# Patient Record
Sex: MALE | Race: ASIAN | Employment: OTHER | URBAN - NONMETROPOLITAN AREA
[De-identification: names, ages, dates, MRNs, and addresses within clinical notes are randomized per-mention and may not be internally consistent; named-entity substitution may affect disease eponyms.]

---

## 2017-01-25 DIAGNOSIS — I48.0 PAF (PAROXYSMAL ATRIAL FIBRILLATION) (HCC): ICD-10-CM

## 2017-01-25 DIAGNOSIS — I50.32 CHF NYHA CLASS II, CHRONIC, DIASTOLIC (HCC): ICD-10-CM

## 2017-01-25 DIAGNOSIS — R06.02 SOB (SHORTNESS OF BREATH) ON EXERTION: Primary | ICD-10-CM

## 2017-04-07 ENCOUNTER — HOSPITAL ENCOUNTER (EMERGENCY)
Age: 67
Discharge: HOME | End: 2017-04-07
Attending: EMERGENCY MEDICINE
Payer: MEDICARE

## 2017-04-07 VITALS
RESPIRATION RATE: 9 BRPM | HEART RATE: 84 BPM | DIASTOLIC BLOOD PRESSURE: 82 MMHG | OXYGEN SATURATION: 98 % | TEMPERATURE: 99 F | SYSTOLIC BLOOD PRESSURE: 129 MMHG

## 2017-04-07 DIAGNOSIS — R55 SYNCOPE AND COLLAPSE: Primary | ICD-10-CM

## 2017-04-07 LAB
ABSOLUTE EOS #: 0 K/UL (ref 0–0.4)
ABSOLUTE LYMPH #: 1.2 K/UL (ref 1–4.8)
ABSOLUTE MONO #: 0.3 K/UL (ref 0–1)
ALBUMIN SERPL-MCNC: 4.3 G/DL (ref 3.5–5.2)
ALBUMIN/GLOBULIN RATIO: 1.7 (ref 1–2.5)
ALP BLD-CCNC: 71 U/L (ref 40–129)
ALT SERPL-CCNC: 29 U/L (ref 5–41)
ANION GAP SERPL CALCULATED.3IONS-SCNC: 12 MMOL/L (ref 9–17)
AST SERPL-CCNC: 28 U/L
BASOPHILS # BLD: 0 % (ref 0–2)
BASOPHILS ABSOLUTE: 0 K/UL (ref 0–0.2)
BILIRUB SERPL-MCNC: 1.19 MG/DL (ref 0.3–1.2)
BUN BLDV-MCNC: 26 MG/DL (ref 8–23)
BUN/CREAT BLD: 23 (ref 9–20)
CALCIUM SERPL-MCNC: 8.8 MG/DL (ref 8.6–10.4)
CHLORIDE BLD-SCNC: 104 MMOL/L (ref 98–107)
CO2: 27 MMOL/L (ref 20–31)
CREAT SERPL-MCNC: 1.15 MG/DL (ref 0.7–1.2)
DIFFERENTIAL TYPE: ABNORMAL
EOSINOPHILS RELATIVE PERCENT: 1 % (ref 0–8)
GFR AFRICAN AMERICAN: >60 ML/MIN
GFR NON-AFRICAN AMERICAN: >60 ML/MIN
GFR SERPL CREATININE-BSD FRML MDRD: ABNORMAL ML/MIN/{1.73_M2}
GFR SERPL CREATININE-BSD FRML MDRD: ABNORMAL ML/MIN/{1.73_M2}
GLUCOSE BLD-MCNC: 104 MG/DL (ref 70–99)
HCT VFR BLD CALC: 41.9 % (ref 41–53)
HEMOGLOBIN: 13.8 G/DL (ref 13.5–17)
LYMPHOCYTES # BLD: 20 % (ref 24–44)
MCH RBC QN AUTO: 29 PG (ref 26–34)
MCHC RBC AUTO-ENTMCNC: 33 G/DL (ref 31–37)
MCV RBC AUTO: 87.9 FL (ref 80–100)
MONOCYTES # BLD: 6 % (ref 0–12)
PDW BLD-RTO: 14.3 % (ref 12.1–15.2)
PLATELET # BLD: 143 K/UL (ref 140–450)
PLATELET ESTIMATE: ABNORMAL
PMV BLD AUTO: ABNORMAL FL (ref 6–12)
POTASSIUM SERPL-SCNC: 3.7 MMOL/L (ref 3.7–5.3)
RBC # BLD: 4.77 M/UL (ref 4.5–5.9)
RBC # BLD: ABNORMAL 10*6/UL
SEG NEUTROPHILS: 73 % (ref 36–66)
SEGMENTED NEUTROPHILS ABSOLUTE COUNT: 4.3 K/UL (ref 1.8–7.7)
SODIUM BLD-SCNC: 143 MMOL/L (ref 135–144)
TOTAL PROTEIN: 6.8 G/DL (ref 6.4–8.3)
TROPONIN INTERP: NORMAL
TROPONIN T: <0.03 NG/ML
TSH SERPL DL<=0.05 MIU/L-ACNC: 0.77 MIU/L (ref 0.3–5)
WBC # BLD: 5.9 K/UL (ref 3.5–11)
WBC # BLD: ABNORMAL 10*3/UL

## 2017-04-07 PROCEDURE — 96361 HYDRATE IV INFUSION ADD-ON: CPT

## 2017-04-07 PROCEDURE — 2580000003 HC RX 258: Performed by: EMERGENCY MEDICINE

## 2017-04-07 PROCEDURE — 85025 COMPLETE CBC W/AUTO DIFF WBC: CPT

## 2017-04-07 PROCEDURE — 96360 HYDRATION IV INFUSION INIT: CPT

## 2017-04-07 PROCEDURE — 93005 ELECTROCARDIOGRAM TRACING: CPT

## 2017-04-07 PROCEDURE — 84443 ASSAY THYROID STIM HORMONE: CPT

## 2017-04-07 PROCEDURE — 84484 ASSAY OF TROPONIN QUANT: CPT

## 2017-04-07 PROCEDURE — 99284 EMERGENCY DEPT VISIT MOD MDM: CPT

## 2017-04-07 PROCEDURE — 80053 COMPREHEN METABOLIC PANEL: CPT

## 2017-04-07 RX ORDER — ALBUTEROL SULFATE 90 UG/1
2 AEROSOL, METERED RESPIRATORY (INHALATION) EVERY 6 HOURS PRN
COMMUNITY
End: 2022-06-10 | Stop reason: SDUPTHER

## 2017-04-07 RX ORDER — 0.9 % SODIUM CHLORIDE 0.9 %
1000 INTRAVENOUS SOLUTION INTRAVENOUS ONCE
Status: COMPLETED | OUTPATIENT
Start: 2017-04-07 | End: 2017-04-07

## 2017-04-07 RX ORDER — 0.9 % SODIUM CHLORIDE 0.9 %
500 INTRAVENOUS SOLUTION INTRAVENOUS ONCE
Status: COMPLETED | OUTPATIENT
Start: 2017-04-07 | End: 2017-04-07

## 2017-04-07 RX ADMIN — SODIUM CHLORIDE 500 ML: 9 INJECTION, SOLUTION INTRAVENOUS at 19:11

## 2017-04-07 RX ADMIN — SODIUM CHLORIDE 1000 ML: 9 INJECTION, SOLUTION INTRAVENOUS at 17:25

## 2017-04-07 ASSESSMENT — ENCOUNTER SYMPTOMS
COUGH: 0
BACK PAIN: 0
VOMITING: 0
DIARRHEA: 0
CHEST TIGHTNESS: 0
ABDOMINAL PAIN: 0
SORE THROAT: 0
VOICE CHANGE: 0
SHORTNESS OF BREATH: 0
WHEEZING: 0
FACIAL SWELLING: 0
PHOTOPHOBIA: 0
NAUSEA: 0
BLOOD IN STOOL: 0
TROUBLE SWALLOWING: 0

## 2017-04-13 LAB
EKG ATRIAL RATE: 69 BPM
EKG P AXIS: 42 DEGREES
EKG P-R INTERVAL: 140 MS
EKG Q-T INTERVAL: 376 MS
EKG QRS DURATION: 112 MS
EKG QTC CALCULATION (BAZETT): 402 MS
EKG R AXIS: 8 DEGREES
EKG T AXIS: 2 DEGREES
EKG VENTRICULAR RATE: 69 BPM

## 2017-11-30 ENCOUNTER — OFFICE VISIT (OUTPATIENT)
Dept: PULMONOLOGY | Age: 67
End: 2017-11-30
Payer: MEDICARE

## 2017-11-30 VITALS
RESPIRATION RATE: 16 BRPM | BODY MASS INDEX: 29.66 KG/M2 | SYSTOLIC BLOOD PRESSURE: 136 MMHG | HEART RATE: 72 BPM | DIASTOLIC BLOOD PRESSURE: 80 MMHG | HEIGHT: 65 IN | WEIGHT: 178 LBS | OXYGEN SATURATION: 99 % | TEMPERATURE: 97.1 F

## 2017-11-30 DIAGNOSIS — I48.0 PAROXYSMAL ATRIAL FIBRILLATION (HCC): ICD-10-CM

## 2017-11-30 DIAGNOSIS — K21.9 GASTROESOPHAGEAL REFLUX DISEASE, ESOPHAGITIS PRESENCE NOT SPECIFIED: ICD-10-CM

## 2017-11-30 DIAGNOSIS — Z95.0 PACEMAKER: ICD-10-CM

## 2017-11-30 DIAGNOSIS — I31.39 PERICARDIAL EFFUSION WITH CARDIAC TAMPONADE: ICD-10-CM

## 2017-11-30 DIAGNOSIS — I31.4 PERICARDIAL EFFUSION WITH CARDIAC TAMPONADE: ICD-10-CM

## 2017-11-30 DIAGNOSIS — J45.21 ASTHMA IN ADULT, MILD INTERMITTENT, WITH ACUTE EXACERBATION: Primary | ICD-10-CM

## 2017-11-30 PROCEDURE — 3017F COLORECTAL CA SCREEN DOC REV: CPT | Performed by: INTERNAL MEDICINE

## 2017-11-30 PROCEDURE — 1036F TOBACCO NON-USER: CPT | Performed by: INTERNAL MEDICINE

## 2017-11-30 PROCEDURE — 1123F ACP DISCUSS/DSCN MKR DOCD: CPT | Performed by: INTERNAL MEDICINE

## 2017-11-30 PROCEDURE — G8482 FLU IMMUNIZE ORDER/ADMIN: HCPCS | Performed by: INTERNAL MEDICINE

## 2017-11-30 PROCEDURE — 99213 OFFICE O/P EST LOW 20 MIN: CPT | Performed by: INTERNAL MEDICINE

## 2017-11-30 PROCEDURE — G8419 CALC BMI OUT NRM PARAM NOF/U: HCPCS | Performed by: INTERNAL MEDICINE

## 2017-11-30 PROCEDURE — G8427 DOCREV CUR MEDS BY ELIG CLIN: HCPCS | Performed by: INTERNAL MEDICINE

## 2017-11-30 PROCEDURE — 4040F PNEUMOC VAC/ADMIN/RCVD: CPT | Performed by: INTERNAL MEDICINE

## 2017-11-30 RX ORDER — MONTELUKAST SODIUM 10 MG/1
10 TABLET ORAL DAILY
Qty: 30 TABLET | Refills: 11 | Status: SHIPPED | OUTPATIENT
Start: 2017-11-30 | End: 2017-12-19 | Stop reason: SDUPTHER

## 2017-11-30 RX ORDER — PREDNISONE 10 MG/1
TABLET ORAL
Qty: 30 TABLET | Refills: 0 | Status: SHIPPED | OUTPATIENT
Start: 2017-11-30 | End: 2018-11-16 | Stop reason: ALTCHOICE

## 2017-11-30 ASSESSMENT — ENCOUNTER SYMPTOMS
SHORTNESS OF BREATH: 1
COUGH: 1
WHEEZING: 1
CHEST TIGHTNESS: 1

## 2017-11-30 NOTE — PROGRESS NOTES
Value Ref Range    Ventricular Rate 69 BPM    Atrial Rate 69 BPM    P-R Interval 140 ms    QRS Duration 112 ms    Q-T Interval 376 ms    QTc Calculation (Bazett) 402 ms    P Axis 42 degrees    R Axis 8 degrees    T Axis 2 degrees       Assessment:      1. Asthma in adult, mild intermittent, with acute exacerbation    2. Gastroesophageal reflux disease, esophagitis presence not specified    3. Paroxysmal atrial fibrillation (HCC)    4. Pacemaker    5. Pericardial effusion with cardiac tamponade          Plan:      1. Prednisone standard burst.  2. Alvesco 160 µg(activates and lower airways)  3. Add Singulair 10 mg daily at bedtime. 4. Albuterol when necessary. 5. Discuss strategies for management of asthma. Full-time treatment for part-time disease. 6. Discussed the concept of escalation and de-escalation of treatment. 7. Return as needed. Call if symptoms not better in a few days.       Electronically signed by Homero Hollins DO on 11/30/2017 at 2:43 PM

## 2017-11-30 NOTE — PATIENT INSTRUCTIONS
SURVEY:    You may be receiving a survey from [employer] regarding your visit today. Please complete the survey to enable us to provide the highest quality of care to you and your family. If you cannot score us a very good on any question, please call the office to discuss how we could of made your experience a very good one. Thank you.

## 2017-12-19 DIAGNOSIS — J45.21 ASTHMA IN ADULT, MILD INTERMITTENT, WITH ACUTE EXACERBATION: ICD-10-CM

## 2017-12-19 RX ORDER — MONTELUKAST SODIUM 10 MG/1
10 TABLET ORAL DAILY
Qty: 30 TABLET | Refills: 11 | Status: SHIPPED | OUTPATIENT
Start: 2017-12-19 | End: 2019-02-16 | Stop reason: SDUPTHER

## 2018-06-21 ENCOUNTER — HOSPITAL ENCOUNTER (OUTPATIENT)
Age: 68
Discharge: HOME | End: 2018-06-23
Payer: MEDICARE

## 2018-06-21 ENCOUNTER — HOSPITAL ENCOUNTER (OUTPATIENT)
Dept: GENERAL RADIOLOGY | Age: 68
Discharge: HOME | End: 2018-06-23
Payer: MEDICARE

## 2018-06-21 DIAGNOSIS — M54.5 LOW BACK PAIN, UNSPECIFIED BACK PAIN LATERALITY, UNSPECIFIED CHRONICITY, WITH SCIATICA PRESENCE UNSPECIFIED: ICD-10-CM

## 2018-06-21 PROCEDURE — 72170 X-RAY EXAM OF PELVIS: CPT

## 2018-06-21 PROCEDURE — 72100 X-RAY EXAM L-S SPINE 2/3 VWS: CPT

## 2018-11-16 ENCOUNTER — HOSPITAL ENCOUNTER (INPATIENT)
Age: 68
LOS: 5 days | Discharge: HOME | DRG: 392 | End: 2018-11-21
Attending: INTERNAL MEDICINE | Admitting: INTERNAL MEDICINE
Payer: MEDICARE

## 2018-11-16 ENCOUNTER — HOSPITAL ENCOUNTER (OUTPATIENT)
Dept: CT IMAGING | Age: 68
Discharge: HOME | DRG: 392 | End: 2018-11-18
Payer: MEDICARE

## 2018-11-16 ENCOUNTER — HOSPITAL ENCOUNTER (OUTPATIENT)
Dept: LAB | Age: 68
Discharge: HOME | DRG: 392 | End: 2018-11-16
Payer: MEDICARE

## 2018-11-16 ENCOUNTER — TELEPHONE (OUTPATIENT)
Dept: SURGERY | Age: 68
End: 2018-11-16

## 2018-11-16 ENCOUNTER — HOSPITAL ENCOUNTER (OUTPATIENT)
Dept: INFUSION THERAPY | Age: 68
Discharge: HOME | DRG: 392 | End: 2018-11-16
Payer: MEDICARE

## 2018-11-16 ENCOUNTER — OFFICE VISIT (OUTPATIENT)
Dept: SURGERY | Age: 68
End: 2018-11-16

## 2018-11-16 VITALS
HEIGHT: 65 IN | RESPIRATION RATE: 24 BRPM | TEMPERATURE: 99.4 F | DIASTOLIC BLOOD PRESSURE: 90 MMHG | SYSTOLIC BLOOD PRESSURE: 153 MMHG | BODY MASS INDEX: 29.62 KG/M2

## 2018-11-16 VITALS
DIASTOLIC BLOOD PRESSURE: 96 MMHG | TEMPERATURE: 99.1 F | HEART RATE: 81 BPM | RESPIRATION RATE: 18 BRPM | SYSTOLIC BLOOD PRESSURE: 161 MMHG

## 2018-11-16 DIAGNOSIS — K52.9 COLITIS: Primary | ICD-10-CM

## 2018-11-16 DIAGNOSIS — R10.31 RLQ ABDOMINAL PAIN: ICD-10-CM

## 2018-11-16 DIAGNOSIS — K57.92 ACUTE DIVERTICULITIS: ICD-10-CM

## 2018-11-16 LAB
ABSOLUTE EOS #: 0.05 K/UL (ref 0–0.44)
ABSOLUTE IMMATURE GRANULOCYTE: <0.03 K/UL (ref 0–0.3)
ABSOLUTE LYMPH #: 1.61 K/UL (ref 1.1–3.7)
ABSOLUTE MONO #: 0.7 K/UL (ref 0.1–1.2)
ANION GAP SERPL CALCULATED.3IONS-SCNC: 9 MMOL/L (ref 9–17)
BASOPHILS # BLD: 0 % (ref 0–2)
BASOPHILS ABSOLUTE: <0.03 K/UL (ref 0–0.2)
BUN BLDV-MCNC: 19 MG/DL (ref 8–23)
BUN/CREAT BLD: 19 (ref 9–20)
CALCIUM SERPL-MCNC: 9.8 MG/DL (ref 8.6–10.4)
CHLORIDE BLD-SCNC: 102 MMOL/L (ref 98–107)
CO2: 30 MMOL/L (ref 20–31)
CREAT SERPL-MCNC: 0.98 MG/DL (ref 0.7–1.2)
DIFFERENTIAL TYPE: ABNORMAL
EOSINOPHILS RELATIVE PERCENT: 1 % (ref 1–4)
GFR AFRICAN AMERICAN: >60 ML/MIN
GFR NON-AFRICAN AMERICAN: >60 ML/MIN
GFR SERPL CREATININE-BSD FRML MDRD: NORMAL ML/MIN/{1.73_M2}
GFR SERPL CREATININE-BSD FRML MDRD: NORMAL ML/MIN/{1.73_M2}
GLUCOSE BLD-MCNC: 95 MG/DL (ref 70–99)
HCT VFR BLD CALC: 43.8 % (ref 40.7–50.3)
HEMOGLOBIN: 14.7 G/DL (ref 13–17)
IMMATURE GRANULOCYTES: 0 %
LYMPHOCYTES # BLD: 17 % (ref 24–43)
MCH RBC QN AUTO: 29.5 PG (ref 25.2–33.5)
MCHC RBC AUTO-ENTMCNC: 33.6 G/DL (ref 28.4–34.8)
MCV RBC AUTO: 88 FL (ref 82.6–102.9)
MONOCYTES # BLD: 8 % (ref 3–12)
NRBC AUTOMATED: 0 PER 100 WBC
PDW BLD-RTO: 13.3 % (ref 11.8–14.4)
PLATELET # BLD: 159 K/UL (ref 138–453)
PLATELET ESTIMATE: ABNORMAL
PMV BLD AUTO: 8.7 FL (ref 8.1–13.5)
POTASSIUM SERPL-SCNC: 4.3 MMOL/L (ref 3.7–5.3)
RBC # BLD: 4.98 M/UL (ref 4.21–5.77)
RBC # BLD: ABNORMAL 10*6/UL
SEG NEUTROPHILS: 74 % (ref 36–65)
SEGMENTED NEUTROPHILS ABSOLUTE COUNT: 6.92 K/UL (ref 1.5–8.1)
SODIUM BLD-SCNC: 141 MMOL/L (ref 135–144)
WBC # BLD: 9.3 K/UL (ref 3.5–11.3)
WBC # BLD: ABNORMAL 10*3/UL

## 2018-11-16 PROCEDURE — 96365 THER/PROPH/DIAG IV INF INIT: CPT

## 2018-11-16 PROCEDURE — 2000000000 HC ICU R&B

## 2018-11-16 PROCEDURE — 36415 COLL VENOUS BLD VENIPUNCTURE: CPT

## 2018-11-16 PROCEDURE — 80048 BASIC METABOLIC PNL TOTAL CA: CPT

## 2018-11-16 PROCEDURE — 2500000003 HC RX 250 WO HCPCS: Performed by: SURGERY

## 2018-11-16 PROCEDURE — 74177 CT ABD & PELVIS W/CONTRAST: CPT

## 2018-11-16 PROCEDURE — 99999 PR OFFICE/OUTPT VISIT,PROCEDURE ONLY: CPT | Performed by: SURGERY

## 2018-11-16 PROCEDURE — 99221 1ST HOSP IP/OBS SF/LOW 40: CPT | Performed by: SURGERY

## 2018-11-16 PROCEDURE — 2580000003 HC RX 258: Performed by: SURGERY

## 2018-11-16 PROCEDURE — 85025 COMPLETE CBC W/AUTO DIFF WBC: CPT

## 2018-11-16 PROCEDURE — 6360000004 HC RX CONTRAST MEDICATION: Performed by: INTERNAL MEDICINE

## 2018-11-16 RX ORDER — SULFAMETHOXAZOLE AND TRIMETHOPRIM 800; 160 MG/1; MG/1
1 TABLET ORAL 2 TIMES DAILY
Qty: 20 TABLET | Refills: 0 | Status: SHIPPED | OUTPATIENT
Start: 2018-11-16 | End: 2018-11-26

## 2018-11-16 RX ADMIN — SODIUM CHLORIDE: 9 INJECTION, SOLUTION INTRAVENOUS at 23:10

## 2018-11-16 RX ADMIN — METRONIDAZOLE 500 MG: 500 INJECTION, SOLUTION INTRAVENOUS at 16:33

## 2018-11-16 RX ADMIN — IOPAMIDOL 75 ML: 755 INJECTION, SOLUTION INTRAVENOUS at 14:51

## 2018-11-16 ASSESSMENT — PAIN DESCRIPTION - ORIENTATION: ORIENTATION: MID

## 2018-11-16 ASSESSMENT — PAIN DESCRIPTION - DESCRIPTORS: DESCRIPTORS: ACHING

## 2018-11-16 ASSESSMENT — PAIN DESCRIPTION - PAIN TYPE: TYPE: ACUTE PAIN

## 2018-11-16 ASSESSMENT — PAIN DESCRIPTION - FREQUENCY: FREQUENCY: INTERMITTENT

## 2018-11-16 ASSESSMENT — PAIN SCALES - GENERAL: PAINLEVEL_OUTOF10: 2

## 2018-11-16 ASSESSMENT — PAIN DESCRIPTION - LOCATION: LOCATION: ABDOMEN

## 2018-11-16 NOTE — PROGRESS NOTES
TIA  Skin: Denies rashes, ulcers  Musculoskeletal: no gross motor dysfunction. Hx radiculopathy    Allergies: Amoxicillin-pot clavulanate; Ciprofloxacin; and Amoxicillin    Current Meds:  Current Outpatient Prescriptions:     sulfamethoxazole-trimethoprim (BACTRIM DS;SEPTRA DS) 800-160 MG per tablet, Take 1 tablet by mouth 2 times daily for 10 days, Disp: 20 tablet, Rfl: 0    Fluticasone Furoate-Vilanterol (BREO ELLIPTA) 200-25 MCG/INH AEPB, Inhale 1 puff into the lungs every other day , Disp: , Rfl:     montelukast (SINGULAIR) 10 MG tablet, Take 1 tablet by mouth daily, Disp: 30 tablet, Rfl: 11    albuterol sulfate  (90 BASE) MCG/ACT inhaler, Inhale 2 puffs into the lungs every 6 hours as needed for Wheezing, Disp: , Rfl:     BYSTOLIC 5 MG tablet, Take 5 mg by mouth daily Patient taking 2.5mg, Disp: , Rfl:     apixaban (ELIQUIS) 5 MG TABS tablet, Take 1 tablet by mouth 2 times daily, Disp: 180 tablet, Rfl: 3    atorvastatin (LIPITOR) 10 MG tablet, Take 1 tablet by mouth daily. , Disp: 30 tablet, Rfl: 3    pantoprazole (PROTONIX) 40 MG tablet, Take 1 tablet by mouth daily. , Disp: 30 tablet, Rfl: 3    tamsulosin (FLOMAX) 0.4 MG capsule, Take 1 capsule by mouth daily. , Disp: 30 capsule, Rfl: 3    allopurinol (ZYLOPRIM) 300 MG tablet, Take 300 mg by mouth daily. , Disp: , Rfl:     levothyroxine (SYNTHROID) 112 MCG tablet, Take 112 mcg by mouth Daily. , Disp: , Rfl:     Physical Exam:  Vital signs and Nurse's note reviewed  Gen:  A&Ox3, NAD. Pleasant and cooperative. HEENT: EOMI, no scleral icterus  CVS: Regular rate.  Has pacemaker  Resp: no active wheezing, no labored breathing  Abd: soft, tenderness right abdomen no rigidity no peritonitis  Ext: Moves all extremities, no gross focal motor deficits  Skin: No erythema or ulcerations     Labs:   Lab Results   Component Value Date    WBC 9.3 11/16/2018    HGB 14.7 11/16/2018    HCT 43.8 11/16/2018    MCV 88.0 11/16/2018     11/16/2018     01/21/2012     Lab Results   Component Value Date     11/16/2018    K 4.3 11/16/2018     11/16/2018    CO2 30 11/16/2018    BUN 19 11/16/2018    CREATININE 0.98 11/16/2018    GLUCOSE 95 11/16/2018    GLUCOSE 128 01/21/2012    CALCIUM 9.8 11/16/2018     Lab Results   Component Value Date    ALKPHOS 71 04/07/2017    ALT 29 04/07/2017    AST 28 04/07/2017    PROT 6.8 04/07/2017    BILITOT 1.19 04/07/2017    LABALBU 4.3 04/07/2017    LABALBU 3.3 01/21/2012     Radiologic Studies:     EXAMINATION:   CT OF THE ABDOMEN AND PELVIS WITH CONTRAST 11/16/2018 2:51 pm       TECHNIQUE:   CT of the abdomen and pelvis was performed with the administration of   intravenous contrast. Multiplanar reformatted images are provided for review. Dose modulation, iterative reconstruction, and/or weight based adjustment of   the mA/kV was utilized to reduce the radiation dose to as low as reasonably   achievable.       COMPARISON:   None.       HISTORY:   ORDERING SYSTEM PROVIDED HISTORY: [facility] abdominal pain   TECHNOLOGIST PROVIDED HISTORY:           FINDINGS:   Lower Chest: The lung bases are clear.  A pacing device is partially   visualized.       Organs: The liver, gallbladder, pancreas, spleen, adrenals and kidneys reveal   no acute findings.  Bilateral renal cysts are noted.  Additionally there are   subcentimeter hypoattenuating renal lesions too small to characterize however   these likely represent cysts.       GI/Bowel: Right-sided diverticulosis is present with an area of localized   inflammatory process in the proximal ascending colon compatible with acute   diverticulitis. Rosaura Record is no gross perforation, abscess formation or evidence   for obstruction.  The nearby appendix is normal in appearance.  The terminal   ileum also appears normal.  The remainder of the colon and small bowel   appears within normal limits.       Pelvis: No acute findings.  The prostate is mildly enlarged, impressing upon   the bladder base.

## 2018-11-16 NOTE — PLAN OF CARE
IV ATB  Infusion completed. IV lock flushed with out complication. Tolerated well. BP rechecked. Stated he would recheck at home. Instructed to call PCP with any change in condition or worsening symptoms. Reviewed next appointment time. Refused w/c at present. Ambulated to private vehicle with spouse.  Adarsh Roberts RN

## 2018-11-17 PROBLEM — K57.92 DIVERTICULITIS: Status: ACTIVE | Noted: 2018-11-17

## 2018-11-17 LAB
ABSOLUTE EOS #: <0.03 K/UL (ref 0–0.44)
ABSOLUTE IMMATURE GRANULOCYTE: <0.03 K/UL (ref 0–0.3)
ABSOLUTE LYMPH #: 1.01 K/UL (ref 1.1–3.7)
ABSOLUTE MONO #: 0.82 K/UL (ref 0.1–1.2)
ANION GAP SERPL CALCULATED.3IONS-SCNC: 13 MMOL/L (ref 9–17)
BASOPHILS # BLD: 0 % (ref 0–2)
BASOPHILS ABSOLUTE: <0.03 K/UL (ref 0–0.2)
BUN BLDV-MCNC: 16 MG/DL (ref 8–23)
BUN/CREAT BLD: 15 (ref 9–20)
C-REACTIVE PROTEIN: 76.3 MG/L (ref 0–5)
CALCIUM SERPL-MCNC: 8.7 MG/DL (ref 8.6–10.4)
CHLORIDE BLD-SCNC: 99 MMOL/L (ref 98–107)
CO2: 26 MMOL/L (ref 20–31)
CREAT SERPL-MCNC: 1.08 MG/DL (ref 0.7–1.2)
DIFFERENTIAL TYPE: ABNORMAL
EOSINOPHILS RELATIVE PERCENT: 0 % (ref 1–4)
GFR AFRICAN AMERICAN: >60 ML/MIN
GFR NON-AFRICAN AMERICAN: >60 ML/MIN
GFR SERPL CREATININE-BSD FRML MDRD: ABNORMAL ML/MIN/{1.73_M2}
GFR SERPL CREATININE-BSD FRML MDRD: ABNORMAL ML/MIN/{1.73_M2}
GLUCOSE BLD-MCNC: 112 MG/DL (ref 70–99)
HCT VFR BLD CALC: 40.2 % (ref 40.7–50.3)
HEMOGLOBIN: 13.6 G/DL (ref 13–17)
IMMATURE GRANULOCYTES: 0 %
LACTIC ACID, WHOLE BLOOD: NORMAL MMOL/L (ref 0.7–2.1)
LACTIC ACID: 0.9 MMOL/L (ref 0.5–2.2)
LYMPHOCYTES # BLD: 9 % (ref 24–43)
MCH RBC QN AUTO: 29 PG (ref 25.2–33.5)
MCHC RBC AUTO-ENTMCNC: 33.8 G/DL (ref 28.4–34.8)
MCV RBC AUTO: 85.7 FL (ref 82.6–102.9)
MONOCYTES # BLD: 8 % (ref 3–12)
NRBC AUTOMATED: 0 PER 100 WBC
PDW BLD-RTO: 13.3 % (ref 11.8–14.4)
PLATELET # BLD: 153 K/UL (ref 138–453)
PLATELET ESTIMATE: ABNORMAL
PMV BLD AUTO: 9.6 FL (ref 8.1–13.5)
POTASSIUM SERPL-SCNC: 3.9 MMOL/L (ref 3.7–5.3)
RBC # BLD: 4.69 M/UL (ref 4.21–5.77)
RBC # BLD: ABNORMAL 10*6/UL
SEG NEUTROPHILS: 83 % (ref 36–65)
SEGMENTED NEUTROPHILS ABSOLUTE COUNT: 8.93 K/UL (ref 1.5–8.1)
SODIUM BLD-SCNC: 138 MMOL/L (ref 135–144)
WBC # BLD: 10.8 K/UL (ref 3.5–11.3)
WBC # BLD: ABNORMAL 10*3/UL

## 2018-11-17 PROCEDURE — 2500000003 HC RX 250 WO HCPCS: Performed by: SURGERY

## 2018-11-17 PROCEDURE — 6360000002 HC RX W HCPCS: Performed by: SURGERY

## 2018-11-17 PROCEDURE — 86140 C-REACTIVE PROTEIN: CPT

## 2018-11-17 PROCEDURE — 6370000000 HC RX 637 (ALT 250 FOR IP): Performed by: INTERNAL MEDICINE

## 2018-11-17 PROCEDURE — 83605 ASSAY OF LACTIC ACID: CPT

## 2018-11-17 PROCEDURE — 85025 COMPLETE CBC W/AUTO DIFF WBC: CPT

## 2018-11-17 PROCEDURE — 80048 BASIC METABOLIC PNL TOTAL CA: CPT

## 2018-11-17 PROCEDURE — 2580000003 HC RX 258: Performed by: SURGERY

## 2018-11-17 PROCEDURE — 6370000000 HC RX 637 (ALT 250 FOR IP): Performed by: SURGERY

## 2018-11-17 PROCEDURE — 36415 COLL VENOUS BLD VENIPUNCTURE: CPT

## 2018-11-17 PROCEDURE — 2000000000 HC ICU R&B

## 2018-11-17 RX ORDER — ATORVASTATIN CALCIUM 10 MG/1
10 TABLET, FILM COATED ORAL DAILY
Status: DISCONTINUED | OUTPATIENT
Start: 2018-11-17 | End: 2018-11-21 | Stop reason: HOSPADM

## 2018-11-17 RX ORDER — ALBUTEROL SULFATE 90 UG/1
2 AEROSOL, METERED RESPIRATORY (INHALATION) EVERY 6 HOURS PRN
Status: DISCONTINUED | OUTPATIENT
Start: 2018-11-17 | End: 2018-11-21 | Stop reason: HOSPADM

## 2018-11-17 RX ORDER — LEVOTHYROXINE SODIUM 112 UG/1
112 TABLET ORAL DAILY
Status: DISCONTINUED | OUTPATIENT
Start: 2018-11-17 | End: 2018-11-21 | Stop reason: HOSPADM

## 2018-11-17 RX ORDER — TAMSULOSIN HYDROCHLORIDE 0.4 MG/1
0.4 CAPSULE ORAL DAILY
Status: DISCONTINUED | OUTPATIENT
Start: 2018-11-17 | End: 2018-11-21 | Stop reason: HOSPADM

## 2018-11-17 RX ORDER — HYDROCODONE BITARTRATE AND ACETAMINOPHEN 5; 325 MG/1; MG/1
1 TABLET ORAL EVERY 4 HOURS PRN
Status: DISCONTINUED | OUTPATIENT
Start: 2018-11-17 | End: 2018-11-21 | Stop reason: HOSPADM

## 2018-11-17 RX ORDER — ACETAMINOPHEN 325 MG/1
650 TABLET ORAL EVERY 4 HOURS PRN
Status: DISCONTINUED | OUTPATIENT
Start: 2018-11-17 | End: 2018-11-21 | Stop reason: HOSPADM

## 2018-11-17 RX ORDER — SODIUM CHLORIDE 9 MG/ML
INJECTION, SOLUTION INTRAVENOUS CONTINUOUS
Status: DISCONTINUED | OUTPATIENT
Start: 2018-11-17 | End: 2018-11-19

## 2018-11-17 RX ORDER — FLUTICASONE FUROATE AND VILANTEROL 200; 25 UG/1; UG/1
1 POWDER RESPIRATORY (INHALATION) EVERY OTHER DAY
Status: DISCONTINUED | OUTPATIENT
Start: 2018-11-17 | End: 2018-11-21 | Stop reason: HOSPADM

## 2018-11-17 RX ORDER — ONDANSETRON 2 MG/ML
4 INJECTION INTRAMUSCULAR; INTRAVENOUS EVERY 8 HOURS PRN
Status: DISCONTINUED | OUTPATIENT
Start: 2018-11-17 | End: 2018-11-21 | Stop reason: HOSPADM

## 2018-11-17 RX ORDER — METRONIDAZOLE 250 MG/1
500 TABLET ORAL EVERY 8 HOURS SCHEDULED
Status: DISCONTINUED | OUTPATIENT
Start: 2018-11-17 | End: 2018-11-17

## 2018-11-17 RX ORDER — SULFAMETHOXAZOLE AND TRIMETHOPRIM 800; 160 MG/1; MG/1
1 TABLET ORAL 2 TIMES DAILY
Status: DISCONTINUED | OUTPATIENT
Start: 2018-11-17 | End: 2018-11-17

## 2018-11-17 RX ORDER — PANTOPRAZOLE SODIUM 40 MG/1
40 TABLET, DELAYED RELEASE ORAL DAILY
Status: DISCONTINUED | OUTPATIENT
Start: 2018-11-17 | End: 2018-11-21 | Stop reason: HOSPADM

## 2018-11-17 RX ORDER — ALLOPURINOL 300 MG/1
300 TABLET ORAL DAILY
Status: DISCONTINUED | OUTPATIENT
Start: 2018-11-17 | End: 2018-11-21 | Stop reason: HOSPADM

## 2018-11-17 RX ORDER — MORPHINE SULFATE 2 MG/ML
2 INJECTION, SOLUTION INTRAMUSCULAR; INTRAVENOUS
Status: DISCONTINUED | OUTPATIENT
Start: 2018-11-16 | End: 2018-11-21 | Stop reason: HOSPADM

## 2018-11-17 RX ORDER — LACTOBACILLUS RHAMNOSUS GG 10B CELL
1 CAPSULE ORAL DAILY
Status: DISCONTINUED | OUTPATIENT
Start: 2018-11-17 | End: 2018-11-21 | Stop reason: HOSPADM

## 2018-11-17 RX ORDER — MONTELUKAST SODIUM 10 MG/1
10 TABLET ORAL DAILY
Status: DISCONTINUED | OUTPATIENT
Start: 2018-11-17 | End: 2018-11-21 | Stop reason: HOSPADM

## 2018-11-17 RX ADMIN — METRONIDAZOLE 500 MG: 500 INJECTION, SOLUTION INTRAVENOUS at 18:49

## 2018-11-17 RX ADMIN — MORPHINE SULFATE 2 MG: 2 INJECTION, SOLUTION INTRAMUSCULAR; INTRAVENOUS at 13:17

## 2018-11-17 RX ADMIN — SODIUM CHLORIDE: 9 INJECTION, SOLUTION INTRAVENOUS at 16:34

## 2018-11-17 RX ADMIN — Medication 1 CAPSULE: at 14:13

## 2018-11-17 RX ADMIN — ACETAMINOPHEN 650 MG: 325 TABLET, FILM COATED ORAL at 12:55

## 2018-11-17 RX ADMIN — METRONIDAZOLE 500 MG: 500 INJECTION, SOLUTION INTRAVENOUS at 10:49

## 2018-11-17 RX ADMIN — LEVOTHYROXINE SODIUM 112 MCG: 0.11 TABLET ORAL at 07:43

## 2018-11-17 RX ADMIN — MEROPENEM 1 G: 1 INJECTION, POWDER, FOR SOLUTION INTRAVENOUS at 08:24

## 2018-11-17 RX ADMIN — MEROPENEM 1 G: 1 INJECTION, POWDER, FOR SOLUTION INTRAVENOUS at 16:21

## 2018-11-17 RX ADMIN — ONDANSETRON 4 MG: 2 INJECTION INTRAMUSCULAR; INTRAVENOUS at 01:51

## 2018-11-17 RX ADMIN — SODIUM CHLORIDE: 9 INJECTION, SOLUTION INTRAVENOUS at 07:47

## 2018-11-17 RX ADMIN — METRONIDAZOLE 500 MG: 500 INJECTION, SOLUTION INTRAVENOUS at 00:30

## 2018-11-17 RX ADMIN — ACETAMINOPHEN 650 MG: 325 TABLET, FILM COATED ORAL at 18:49

## 2018-11-17 RX ADMIN — MORPHINE SULFATE 2 MG: 2 INJECTION, SOLUTION INTRAMUSCULAR; INTRAVENOUS at 09:31

## 2018-11-17 ASSESSMENT — PAIN DESCRIPTION - ORIENTATION
ORIENTATION: MID;RIGHT
ORIENTATION: RIGHT
ORIENTATION: MID;RIGHT
ORIENTATION: MID;RIGHT
ORIENTATION: MID
ORIENTATION: MID;RIGHT
ORIENTATION: MID;RIGHT

## 2018-11-17 ASSESSMENT — PAIN DESCRIPTION - PAIN TYPE
TYPE: ACUTE PAIN

## 2018-11-17 ASSESSMENT — PAIN DESCRIPTION - LOCATION
LOCATION: ABDOMEN

## 2018-11-17 ASSESSMENT — PAIN DESCRIPTION - DESCRIPTORS
DESCRIPTORS: ACHING;SHARP

## 2018-11-17 ASSESSMENT — PAIN DESCRIPTION - PROGRESSION
CLINICAL_PROGRESSION: NOT CHANGED
CLINICAL_PROGRESSION: NOT CHANGED
CLINICAL_PROGRESSION: GRADUALLY WORSENING

## 2018-11-17 ASSESSMENT — PAIN DESCRIPTION - ONSET
ONSET: ON-GOING

## 2018-11-17 ASSESSMENT — PAIN SCALES - GENERAL
PAINLEVEL_OUTOF10: 2
PAINLEVEL_OUTOF10: 8
PAINLEVEL_OUTOF10: 0
PAINLEVEL_OUTOF10: 4
PAINLEVEL_OUTOF10: 2
PAINLEVEL_OUTOF10: 2
PAINLEVEL_OUTOF10: 6
PAINLEVEL_OUTOF10: 2
PAINLEVEL_OUTOF10: 2
PAINLEVEL_OUTOF10: 6

## 2018-11-17 ASSESSMENT — PAIN DESCRIPTION - FREQUENCY
FREQUENCY: INTERMITTENT

## 2018-11-17 NOTE — PLAN OF CARE
Problem: Fluid Volume:  Goal: Maintenance of adequate hydration will improve  Maintenance of adequate hydration will improve   Outcome: Ongoing  Zofran 4 mg IVP given for C/O nausea. Denies need for pain medication.

## 2018-11-18 ENCOUNTER — APPOINTMENT (OUTPATIENT)
Dept: CT IMAGING | Age: 68
DRG: 392 | End: 2018-11-18
Attending: INTERNAL MEDICINE
Payer: MEDICARE

## 2018-11-18 ENCOUNTER — APPOINTMENT (OUTPATIENT)
Dept: GENERAL RADIOLOGY | Age: 68
DRG: 392 | End: 2018-11-18
Attending: INTERNAL MEDICINE
Payer: MEDICARE

## 2018-11-18 LAB
ABSOLUTE EOS #: <0.03 K/UL (ref 0–0.44)
ABSOLUTE IMMATURE GRANULOCYTE: 0.05 K/UL (ref 0–0.3)
ABSOLUTE LYMPH #: 1.07 K/UL (ref 1.1–3.7)
ABSOLUTE MONO #: 0.92 K/UL (ref 0.1–1.2)
ANION GAP SERPL CALCULATED.3IONS-SCNC: 14 MMOL/L (ref 9–17)
BASOPHILS # BLD: 0 % (ref 0–2)
BASOPHILS ABSOLUTE: <0.03 K/UL (ref 0–0.2)
BUN BLDV-MCNC: 21 MG/DL (ref 8–23)
BUN/CREAT BLD: 22 (ref 9–20)
C-REACTIVE PROTEIN: 249.3 MG/L (ref 0–5)
CALCIUM SERPL-MCNC: 8 MG/DL (ref 8.6–10.4)
CHLORIDE BLD-SCNC: 103 MMOL/L (ref 98–107)
CO2: 21 MMOL/L (ref 20–31)
CREAT SERPL-MCNC: 0.97 MG/DL (ref 0.7–1.2)
DIFFERENTIAL TYPE: ABNORMAL
EKG ATRIAL RATE: 89 BPM
EKG P AXIS: 53 DEGREES
EKG P-R INTERVAL: 136 MS
EKG Q-T INTERVAL: 378 MS
EKG QRS DURATION: 110 MS
EKG QTC CALCULATION (BAZETT): 459 MS
EKG R AXIS: -3 DEGREES
EKG T AXIS: 7 DEGREES
EKG VENTRICULAR RATE: 89 BPM
EOSINOPHILS RELATIVE PERCENT: 0 % (ref 1–4)
GFR AFRICAN AMERICAN: >60 ML/MIN
GFR NON-AFRICAN AMERICAN: >60 ML/MIN
GFR SERPL CREATININE-BSD FRML MDRD: ABNORMAL ML/MIN/{1.73_M2}
GFR SERPL CREATININE-BSD FRML MDRD: ABNORMAL ML/MIN/{1.73_M2}
GLUCOSE BLD-MCNC: 93 MG/DL (ref 70–99)
HCT VFR BLD CALC: 40.3 % (ref 40.7–50.3)
HEMOGLOBIN: 13.6 G/DL (ref 13–17)
IMMATURE GRANULOCYTES: 0 %
LYMPHOCYTES # BLD: 9 % (ref 24–43)
MAGNESIUM: 1.9 MG/DL (ref 1.6–2.6)
MCH RBC QN AUTO: 29.2 PG (ref 25.2–33.5)
MCHC RBC AUTO-ENTMCNC: 33.7 G/DL (ref 28.4–34.8)
MCV RBC AUTO: 86.7 FL (ref 82.6–102.9)
MONOCYTES # BLD: 8 % (ref 3–12)
NRBC AUTOMATED: 0 PER 100 WBC
PDW BLD-RTO: 13.3 % (ref 11.8–14.4)
PLATELET # BLD: 133 K/UL (ref 138–453)
PLATELET ESTIMATE: ABNORMAL
PMV BLD AUTO: 9.6 FL (ref 8.1–13.5)
POTASSIUM SERPL-SCNC: 4.1 MMOL/L (ref 3.7–5.3)
RBC # BLD: 4.65 M/UL (ref 4.21–5.77)
RBC # BLD: ABNORMAL 10*6/UL
SEG NEUTROPHILS: 83 % (ref 36–65)
SEGMENTED NEUTROPHILS ABSOLUTE COUNT: 9.86 K/UL (ref 1.5–8.1)
SODIUM BLD-SCNC: 138 MMOL/L (ref 135–144)
WBC # BLD: 11.9 K/UL (ref 3.5–11.3)
WBC # BLD: ABNORMAL 10*3/UL

## 2018-11-18 PROCEDURE — 6360000002 HC RX W HCPCS: Performed by: SURGERY

## 2018-11-18 PROCEDURE — 86140 C-REACTIVE PROTEIN: CPT

## 2018-11-18 PROCEDURE — 2500000003 HC RX 250 WO HCPCS: Performed by: SURGERY

## 2018-11-18 PROCEDURE — 93005 ELECTROCARDIOGRAM TRACING: CPT

## 2018-11-18 PROCEDURE — 74177 CT ABD & PELVIS W/CONTRAST: CPT

## 2018-11-18 PROCEDURE — 85025 COMPLETE CBC W/AUTO DIFF WBC: CPT

## 2018-11-18 PROCEDURE — 6360000004 HC RX CONTRAST MEDICATION: Performed by: SURGERY

## 2018-11-18 PROCEDURE — 87324 CLOSTRIDIUM AG IA: CPT

## 2018-11-18 PROCEDURE — 71046 X-RAY EXAM CHEST 2 VIEWS: CPT

## 2018-11-18 PROCEDURE — 2000000000 HC ICU R&B

## 2018-11-18 PROCEDURE — 2580000003 HC RX 258: Performed by: SURGERY

## 2018-11-18 PROCEDURE — 87449 NOS EACH ORGANISM AG IA: CPT

## 2018-11-18 PROCEDURE — 80048 BASIC METABOLIC PNL TOTAL CA: CPT

## 2018-11-18 PROCEDURE — 83735 ASSAY OF MAGNESIUM: CPT

## 2018-11-18 PROCEDURE — 6370000000 HC RX 637 (ALT 250 FOR IP): Performed by: SURGERY

## 2018-11-18 PROCEDURE — 36415 COLL VENOUS BLD VENIPUNCTURE: CPT

## 2018-11-18 RX ADMIN — IOPAMIDOL 75 ML: 755 INJECTION, SOLUTION INTRAVENOUS at 10:13

## 2018-11-18 RX ADMIN — SODIUM CHLORIDE: 9 INJECTION, SOLUTION INTRAVENOUS at 01:01

## 2018-11-18 RX ADMIN — SODIUM CHLORIDE: 9 INJECTION, SOLUTION INTRAVENOUS at 11:32

## 2018-11-18 RX ADMIN — MEROPENEM 1 G: 1 INJECTION, POWDER, FOR SOLUTION INTRAVENOUS at 01:00

## 2018-11-18 RX ADMIN — MEROPENEM 1 G: 1 INJECTION, POWDER, FOR SOLUTION INTRAVENOUS at 16:44

## 2018-11-18 RX ADMIN — MEROPENEM 1 G: 1 INJECTION, POWDER, FOR SOLUTION INTRAVENOUS at 08:36

## 2018-11-18 RX ADMIN — IOHEXOL 50 ML: 240 INJECTION, SOLUTION INTRATHECAL; INTRAVASCULAR; INTRAVENOUS; ORAL at 10:12

## 2018-11-18 RX ADMIN — METRONIDAZOLE 500 MG: 500 INJECTION, SOLUTION INTRAVENOUS at 11:32

## 2018-11-18 RX ADMIN — Medication 1 CAPSULE: at 11:34

## 2018-11-18 RX ADMIN — METRONIDAZOLE 500 MG: 500 INJECTION, SOLUTION INTRAVENOUS at 18:42

## 2018-11-18 RX ADMIN — SODIUM CHLORIDE: 9 INJECTION, SOLUTION INTRAVENOUS at 20:29

## 2018-11-18 RX ADMIN — ENOXAPARIN SODIUM 40 MG: 40 INJECTION SUBCUTANEOUS at 11:47

## 2018-11-18 RX ADMIN — TAMSULOSIN HYDROCHLORIDE 0.4 MG: 0.4 CAPSULE ORAL at 11:34

## 2018-11-18 RX ADMIN — METRONIDAZOLE 500 MG: 500 INJECTION, SOLUTION INTRAVENOUS at 03:42

## 2018-11-18 RX ADMIN — LEVOTHYROXINE SODIUM 112 MCG: 0.11 TABLET ORAL at 06:58

## 2018-11-18 ASSESSMENT — PAIN SCALES - GENERAL
PAINLEVEL_OUTOF10: 0
PAINLEVEL_OUTOF10: 0
PAINLEVEL_OUTOF10: 2
PAINLEVEL_OUTOF10: 0
PAINLEVEL_OUTOF10: 2

## 2018-11-18 ASSESSMENT — PAIN DESCRIPTION - FREQUENCY
FREQUENCY: INTERMITTENT

## 2018-11-18 ASSESSMENT — PAIN DESCRIPTION - DESCRIPTORS
DESCRIPTORS: SORE

## 2018-11-18 ASSESSMENT — PAIN DESCRIPTION - PAIN TYPE
TYPE: ACUTE PAIN

## 2018-11-18 ASSESSMENT — PAIN DESCRIPTION - ORIENTATION
ORIENTATION: RIGHT

## 2018-11-18 ASSESSMENT — PAIN DESCRIPTION - LOCATION
LOCATION: ABDOMEN
LOCATION: ABDOMEN;BACK
LOCATION: ABDOMEN
LOCATION: ABDOMEN

## 2018-11-18 ASSESSMENT — PAIN DESCRIPTION - ONSET: ONSET: ON-GOING

## 2018-11-18 NOTE — PLAN OF CARE
Problem: Pain:  Goal: Pain level will decrease  Pain level will decrease   Outcome: Ongoing  Pt able to express pain with 0-10 pain scale, will continue to monitor    Problem: Falls - Risk of:  Goal: Will remain free from falls  Will remain free from falls   Outcome: Ongoing  Pt up independent in room, tolerates well, call light in reach. Problem: Bowel/Gastric:  Goal: Bowel function will improve  Bowel function will improve   Outcome: Ongoing  Bowel sounds active, tenderness to right side of abd, will continue to monitor. Problem: Fluid Volume:  Goal: Maintenance of adequate hydration will improve  Maintenance of adequate hydration will improve   Outcome: Ongoing  Will continue to monitor.

## 2018-11-18 NOTE — PLAN OF CARE
Problem: Pain:  Goal: Pain level will decrease  Pain level will decrease   Outcome: Ongoing  States that pain level is tolerable. Pain meds offered and refused. Problem: Falls - Risk of:  Goal: Will remain free from falls  Will remain free from falls   Outcome: Ongoing  PT has not fallen thus far this hospital stay. Is able to ambulate without assist. PT is alert and oriented to own limitations. PT knows how to use call system to ask for assist.    Problem: Bowel/Gastric:  Goal: Bowel function will improve  Bowel function will improve   Outcome: Ongoing  Two bowel movements today. Intervention: Monitor nausea and/or vomiting  Denies any nausea. Problem: Physical Regulation:  Goal: Ability to maintain a body temperature in the normal range will improve  Ability to maintain a body temperature in the normal range will improve  Outcome: Ongoing  Temp. 99.6    Problem: Sensory:  Goal: Pain level will decrease  Pain level will decrease   Outcome: Ongoing  States that pain level is tolerable. Pain meds offered and refused.

## 2018-11-18 NOTE — CONSULTS
GENERAL SURGERY CONSULTATION       date of evaluation/exam: 11/16/18      Patient's Name/ Date of Birth/ Gender: Aurora Hernandez / 1950 (76 y.o.) / male      PCP: Dr. 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 Symptoms started last night, CBC and BMP within normal limits. CT scan abdomen/pelvis images reviewed today, ascending colon inflammation without free air or abscess or obstruction. He was added on to office schedule today. He is here with his wife. Stable. No nausea or vomiting. Denies melena or hematochezia. On Eliquis. Last colonscopy over 10 years ago.     Past Medical History:  has a past medical history of Asthma; BPH (benign prostatic hyperplasia); Fracture of L1 vertebra ([address] GERD (gastroesophageal reflux disease); Gout; High altitude pulmonary edema; Hyperlipidemia; Hypertension; Radiculopathy of leg; Sleep apnea; Thyroid disease; and Wrist fracture, left.     Past Surgical History:   Past Surgical History         Past Surgical History:   Procedure Laterality Date    COLON SURGERY         perirectal abcess    PACEMAKER INSERTION                Social History:  reports that he quit smoking about 36 years ago. He smoked 15.00 packs per day. He has quit using smokeless tobacco. He reports that he drinks about 0.6 oz of alcohol per week .     Family History: family history includes COPD in his father; Hypertension in his sister; Pacemaker in his mother; Seizures in his mother.     Review of Systems:   General: Denies fever, chills, night sweats, weight loss, malaise, fatigue  HEENT: Denies sore throat, sinus problems, allergic rhinosinusitis  Card: Denies chest pain, palpitations, orthopnea/PND. HTN. Pacemaker, history arralicia, on Eliquis  Pulm: Denies cough, shortness of breath, dyspnea on exertion. Asthma. Sleep apnea, nonsmoker. GI:  per HPI. Hx gerd  : Denies polyuria, dysuria, hematuria.  Hx 11/16/2018     HGB 14.7 11/16/2018     HCT 43.8 11/16/2018     MCV 88.0 11/16/2018      11/16/2018      01/21/2012            Lab Results   Component Value Date      11/16/2018     K 4.3 11/16/2018      11/16/2018     CO2 30 11/16/2018     BUN 19 11/16/2018     CREATININE 0.98 11/16/2018     GLUCOSE 95 11/16/2018     GLUCOSE 128 01/21/2012     CALCIUM 9.8 11/16/2018            Lab Results   Component Value Date     ALKPHOS 71 04/07/2017     ALT 29 04/07/2017     AST 28 04/07/2017     PROT 6.8 04/07/2017     BILITOT 1.19 04/07/2017     LABALBU 4.3 04/07/2017     LABALBU 3.3 01/21/2012      Radiologic Studies:     EXAMINATION:   CT OF THE ABDOMEN AND PELVIS WITH CONTRAST 11/16/2018 2:51 pm       TECHNIQUE:   CT of the abdomen and pelvis was performed with the administration of   intravenous contrast. Multiplanar reformatted images are provided for review. Dose modulation, iterative reconstruction, and/or weight based adjustment of   the mA/kV was utilized to reduce the radiation dose to as low as reasonably   achievable.       COMPARISON:   None.       HISTORY:   ORDERING SYSTEM PROVIDED HISTORY: [facility] abdominal pain   TECHNOLOGIST PROVIDED HISTORY:           FINDINGS:   Lower Chest: The lung bases are clear.  A pacing device is partially   visualized.       Organs:  The liver, gallbladder, pancreas, spleen, adrenals and kidneys reveal   no acute findings.  Bilateral renal cysts are noted.  Additionally there are   subcentimeter hypoattenuating renal lesions too small to characterize however   these likely represent cysts.       GI/Bowel: Right-sided diverticulosis is present with an area of localized   inflammatory process in the proximal ascending colon compatible with acute   diverticulitis. Jing Friendly is no gross perforation, abscess formation or evidence   for obstruction.  The nearby appendix is normal in appearance.  The terminal   ileum also appears normal.  The remainder of the colon and

## 2018-11-19 PROBLEM — K57.32 DIVERTICULITIS LARGE INTESTINE W/O PERFORATION OR ABSCESS W/O BLEEDING: Status: ACTIVE | Noted: 2018-11-19

## 2018-11-19 LAB
ABSOLUTE EOS #: 0.06 K/UL (ref 0–0.44)
ABSOLUTE IMMATURE GRANULOCYTE: 0.03 K/UL (ref 0–0.3)
ABSOLUTE LYMPH #: 1.27 K/UL (ref 1.1–3.7)
ABSOLUTE MONO #: 0.87 K/UL (ref 0.1–1.2)
ANION GAP SERPL CALCULATED.3IONS-SCNC: 12 MMOL/L (ref 9–17)
BASOPHILS # BLD: 0 % (ref 0–2)
BASOPHILS ABSOLUTE: <0.03 K/UL (ref 0–0.2)
BUN BLDV-MCNC: 18 MG/DL (ref 8–23)
BUN/CREAT BLD: 20 (ref 9–20)
C DIFF AG + TOXIN: NORMAL
C-REACTIVE PROTEIN: 237.2 MG/L (ref 0–5)
CALCIUM SERPL-MCNC: 7.9 MG/DL (ref 8.6–10.4)
CHLORIDE BLD-SCNC: 102 MMOL/L (ref 98–107)
CO2: 23 MMOL/L (ref 20–31)
CREAT SERPL-MCNC: 0.9 MG/DL (ref 0.7–1.2)
DIFFERENTIAL TYPE: ABNORMAL
EOSINOPHILS RELATIVE PERCENT: 1 % (ref 1–4)
GFR AFRICAN AMERICAN: >60 ML/MIN
GFR NON-AFRICAN AMERICAN: >60 ML/MIN
GFR SERPL CREATININE-BSD FRML MDRD: ABNORMAL ML/MIN/{1.73_M2}
GFR SERPL CREATININE-BSD FRML MDRD: ABNORMAL ML/MIN/{1.73_M2}
GLUCOSE BLD-MCNC: 93 MG/DL (ref 70–99)
HCT VFR BLD CALC: 37.5 % (ref 40.7–50.3)
HEMOGLOBIN: 12.5 G/DL (ref 13–17)
IMMATURE GRANULOCYTES: 0 %
LACTIC ACID, WHOLE BLOOD: NORMAL MMOL/L (ref 0.7–2.1)
LACTIC ACID: 1.1 MMOL/L (ref 0.5–2.2)
LYMPHOCYTES # BLD: 12 % (ref 24–43)
MCH RBC QN AUTO: 29.2 PG (ref 25.2–33.5)
MCHC RBC AUTO-ENTMCNC: 33.3 G/DL (ref 28.4–34.8)
MCV RBC AUTO: 87.6 FL (ref 82.6–102.9)
MONOCYTES # BLD: 8 % (ref 3–12)
NRBC AUTOMATED: 0 PER 100 WBC
PDW BLD-RTO: 13.2 % (ref 11.8–14.4)
PLATELET # BLD: 131 K/UL (ref 138–453)
PLATELET ESTIMATE: ABNORMAL
PMV BLD AUTO: 9.3 FL (ref 8.1–13.5)
POTASSIUM SERPL-SCNC: 3.9 MMOL/L (ref 3.7–5.3)
RBC # BLD: 4.28 M/UL (ref 4.21–5.77)
RBC # BLD: ABNORMAL 10*6/UL
SEG NEUTROPHILS: 79 % (ref 36–65)
SEGMENTED NEUTROPHILS ABSOLUTE COUNT: 8.17 K/UL (ref 1.5–8.1)
SODIUM BLD-SCNC: 137 MMOL/L (ref 135–144)
SPECIMEN DESCRIPTION: NORMAL
WBC # BLD: 10.4 K/UL (ref 3.5–11.3)
WBC # BLD: ABNORMAL 10*3/UL

## 2018-11-19 PROCEDURE — 36415 COLL VENOUS BLD VENIPUNCTURE: CPT

## 2018-11-19 PROCEDURE — 2500000003 HC RX 250 WO HCPCS: Performed by: SURGERY

## 2018-11-19 PROCEDURE — 1200000000 HC SEMI PRIVATE

## 2018-11-19 PROCEDURE — 2580000003 HC RX 258: Performed by: SURGERY

## 2018-11-19 PROCEDURE — 86140 C-REACTIVE PROTEIN: CPT

## 2018-11-19 PROCEDURE — 99221 1ST HOSP IP/OBS SF/LOW 40: CPT | Performed by: INTERNAL MEDICINE

## 2018-11-19 PROCEDURE — 6370000000 HC RX 637 (ALT 250 FOR IP): Performed by: SURGERY

## 2018-11-19 PROCEDURE — 80048 BASIC METABOLIC PNL TOTAL CA: CPT

## 2018-11-19 PROCEDURE — 6360000002 HC RX W HCPCS: Performed by: SURGERY

## 2018-11-19 PROCEDURE — 6360000002 HC RX W HCPCS: Performed by: FAMILY MEDICINE

## 2018-11-19 PROCEDURE — 83605 ASSAY OF LACTIC ACID: CPT

## 2018-11-19 PROCEDURE — 85025 COMPLETE CBC W/AUTO DIFF WBC: CPT

## 2018-11-19 PROCEDURE — 99221 1ST HOSP IP/OBS SF/LOW 40: CPT | Performed by: FAMILY MEDICINE

## 2018-11-19 RX ORDER — METRONIDAZOLE 250 MG/1
500 TABLET ORAL EVERY 8 HOURS SCHEDULED
Status: DISCONTINUED | OUTPATIENT
Start: 2018-11-20 | End: 2018-11-21 | Stop reason: HOSPADM

## 2018-11-19 RX ORDER — CEFDINIR 300 MG/1
300 CAPSULE ORAL EVERY 12 HOURS SCHEDULED
Status: DISCONTINUED | OUTPATIENT
Start: 2018-11-20 | End: 2018-11-21 | Stop reason: HOSPADM

## 2018-11-19 RX ADMIN — METRONIDAZOLE 500 MG: 500 INJECTION, SOLUTION INTRAVENOUS at 10:50

## 2018-11-19 RX ADMIN — TAMSULOSIN HYDROCHLORIDE 0.4 MG: 0.4 CAPSULE ORAL at 08:50

## 2018-11-19 RX ADMIN — METRONIDAZOLE 500 MG: 500 INJECTION, SOLUTION INTRAVENOUS at 02:54

## 2018-11-19 RX ADMIN — MEROPENEM 1 G: 1 INJECTION, POWDER, FOR SOLUTION INTRAVENOUS at 08:50

## 2018-11-19 RX ADMIN — MONTELUKAST SODIUM 10 MG: 10 TABLET, FILM COATED ORAL at 08:50

## 2018-11-19 RX ADMIN — MEROPENEM 1 G: 1 INJECTION, POWDER, FOR SOLUTION INTRAVENOUS at 00:23

## 2018-11-19 RX ADMIN — SODIUM CHLORIDE: 9 INJECTION, SOLUTION INTRAVENOUS at 04:50

## 2018-11-19 RX ADMIN — MEROPENEM 1 G: 1 INJECTION, POWDER, FOR SOLUTION INTRAVENOUS at 16:42

## 2018-11-19 RX ADMIN — LEVOTHYROXINE SODIUM 112 MCG: 0.11 TABLET ORAL at 06:58

## 2018-11-19 RX ADMIN — METRONIDAZOLE 500 MG: 500 INJECTION, SOLUTION INTRAVENOUS at 19:47

## 2018-11-19 RX ADMIN — ENOXAPARIN SODIUM 80 MG: 80 INJECTION SUBCUTANEOUS at 20:56

## 2018-11-19 RX ADMIN — ENOXAPARIN SODIUM 40 MG: 40 INJECTION SUBCUTANEOUS at 08:50

## 2018-11-19 RX ADMIN — Medication 1 CAPSULE: at 08:50

## 2018-11-19 ASSESSMENT — PAIN DESCRIPTION - DESCRIPTORS
DESCRIPTORS: HEADACHE
DESCRIPTORS: TENDER;DULL

## 2018-11-19 ASSESSMENT — PAIN DESCRIPTION - LOCATION
LOCATION: ABDOMEN;HEAD
LOCATION: ABDOMEN

## 2018-11-19 ASSESSMENT — PAIN DESCRIPTION - PROGRESSION
CLINICAL_PROGRESSION: GRADUALLY IMPROVING
CLINICAL_PROGRESSION: GRADUALLY IMPROVING

## 2018-11-19 ASSESSMENT — PAIN DESCRIPTION - ORIENTATION: ORIENTATION: RIGHT

## 2018-11-19 ASSESSMENT — PAIN DESCRIPTION - PAIN TYPE
TYPE: ACUTE PAIN
TYPE: ACUTE PAIN

## 2018-11-19 ASSESSMENT — PAIN DESCRIPTION - ONSET: ONSET: ON-GOING

## 2018-11-19 ASSESSMENT — PAIN SCALES - GENERAL
PAINLEVEL_OUTOF10: 2
PAINLEVEL_OUTOF10: 2

## 2018-11-19 NOTE — CONSULTS
Prescriptions Marked as Taking for the 11/16/18 encounter [facility] Encounter)   Medication Sig Dispense Refill    sulfamethoxazole-trimethoprim (BACTRIM DS;SEPTRA DS) 800-160 MG per tablet Take 1 tablet by mouth 2 times daily for 10 days 20 tablet 0    Fluticasone Furoate-Vilanterol (BREO ELLIPTA) 200-25 MCG/INH AEPB Inhale 1 puff into the lungs every other day       montelukast (SINGULAIR) 10 MG tablet Take 1 tablet by mouth daily 30 tablet 11    albuterol sulfate  (90 BASE) MCG/ACT inhaler Inhale 2 puffs into the lungs every 6 hours as needed for Wheezing      apixaban (ELIQUIS) 5 MG TABS tablet Take 1 tablet by mouth 2 times daily 180 tablet 3    atorvastatin (LIPITOR) 10 MG tablet Take 1 tablet by mouth daily. 30 tablet 3    pantoprazole (PROTONIX) 40 MG tablet Take 1 tablet by mouth daily. 30 tablet 3    tamsulosin (FLOMAX) 0.4 MG capsule Take 1 capsule by mouth daily. 30 capsule 3    allopurinol (ZYLOPRIM) 300 MG tablet Take 300 mg by mouth daily.  levothyroxine (SYNTHROID) 112 MCG tablet Take 112 mcg by mouth Daily.            morphine (PF) injection 2 mg Q3H PRN   ondansetron (ZOFRAN) injection 4 mg Q8H PRN   0.9 % sodium chloride infusion Continuous   HYDROcodone-acetaminophen (NORCO) 5-325 MG per tablet 1 tablet Q4H PRN   albuterol sulfate  (90 Base) MCG/ACT inhaler 2 puff Q6H PRN   allopurinol (ZYLOPRIM) tablet 300 mg Daily   atorvastatin (LIPITOR) tablet 10 mg Daily   Fluticasone Furoate-Vilanterol 200-25 MCG/INH AEPB 1 puff Every Other Day   levothyroxine (SYNTHROID) tablet 112 mcg Daily   montelukast (SINGULAIR) tablet 10 mg Daily   pantoprazole (PROTONIX) tablet 40 mg Daily   tamsulosin (FLOMAX) capsule 0.4 mg Daily   meropenem (MERREM) 1 g in sterile water 20 mL IV syringe Q8H   acetaminophen (TYLENOL) tablet 650 mg Q4H PRN   metronidazole (FLAGYL) 500 mg in NaCl 100 mL IVPB premix Q8H   enoxaparin (LOVENOX) injection 40 mg Daily   lactobacillus (CULTURELLE) capsule 1  11/19/2018    K 3.9 11/19/2018     11/19/2018    CREATININE 0.90 11/19/2018    BUN 18 11/19/2018    CO2 23 11/19/2018    TSH 0.77 04/07/2017    PSA 2.30 12/22/2015    LABA1C 5.1 01/21/2012        ASSESSMENT:  Patient Active Problem List    Diagnosis Date Noted    Diverticulitis 11/17/2018     Priority: Low    Acute diverticulitis 11/16/2018     Priority: Low    Numbness and tingling of both legs below knees 01/29/2016     Priority: Low    Carpal tunnel syndrome of left wrist 01/29/2016     Priority: Low    Wrist pain, chronic 01/29/2016     Priority: Low    Left-sided low back pain with left-sided sciatica 01/29/2016     Priority: Low    Chronic foot pain 01/29/2016     Priority: Low    Paroxysmal atrial fibrillation ([facility] [address] 12/02/2014     Priority: Low    Pericardial effusion with cardiac tamponade 12/02/2014     Priority: Low    Pacemaker 12/02/2014     Priority: Low       PLAN:  Chronic Diastolic Heart Failure: Currently well controlled. Will continue to monitor while on IVF. Nonpharmacologic management of Heart Failure: I advised him to try and keep his legs up whenever possible. Additional Testing: None    Paroxysmal Atrial Fibrillation: Rate control  Beta Blocker: Not able to take due to history of lightheadedness and dizziness. Calcium Channel Blocker: Not indicated      Rhythm Control Agent: Not indicated       Monitoring: Not applicable   Stroke Risk: His CHADS2-VASc score is 2/9 (2.2% stroke risk)  Anticoagulation: Would continue SQ lovenox at 1 mg/kg for 1 more day but would probably restart Eliquis tomorrow morning if no indication of surgery likely. I also reminded him to watch for signs of blood in his stool or black tarry stools and stop the medication immediately if this develops as this could be life threatening. · Sick sinus syndrome s/p pacemaker in the past: Appears to be working appropriately.     · Acute Diverticulitis: Appears to be improving: will defer any further testing and/or treatment to Dr. Gemini Young and Dr. Americo York. Will plan to call her later today with an update. Once again, thank you for allowing me to participate in this patients care. Please do not hesitate to contact me if I could be of any further assistance. Sincerely,  Jac Garcia. Timothy CLARKE, MS, F.A.C.C. [facility]   [facility], [address] [place] [address]  Phone: [phone], Fax: [phone]    I believe that the risk of significant morbidity and mortality related to the patient's current medical conditions are: Intermediate.

## 2018-11-19 NOTE — H&P
[address]                              HISTORY AND PHYSICAL    PATIENT NAME: Mata Stafford             :        1950  MED REC NO:   [id number]                              ROOM:       I305  ACCOUNT NO:   [de-identified]                           ADMIT DATE: 2018  PROVIDER:     Shon Martinez    CHIEF COMPLAINT:  Abdominal pain. 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 It is more or  less diffuse, crampy-type abdominal pain associated with nausea, but no  vomiting. There is no constipation or diarrhea. The patient has had  intense nausea, but no vomiting. There is some bloating sensation of  the stomach. The patient is able to pass flatus. Also, the pain is  more or less mostly localized in the right side of the abdomen. The  patient has had some outpatient studies done including CBC, CMP, and CAT  scan, which revealed scattered diverticulosis with an area of  diverticulitis. The patient initially tried to treat this as an  outpatient with IV Flagyl as well as Bactrim; however, as the time  passed, the abdominal pain got worse and the patient became profusely  sweaty and passed out. Ambulance was called. Then, the patient was  alert and oriented. Then, the patient was admitted directly to the  intensive care unit. The patient states he has had no similar episodes in the past.  The  patient has had irritable bowel syndrome with constipation, diarrhea,  etc.  The patient did not remember having any GI workup in the past.   So, the patient was admitted to the ICU. IV fluids were started and  also empiric antibiotic therapy with Flagyl and meropenem 1 gm. PAST MEDICAL HISTORY:  History of tachybrady syndrome and the patient  has a pacemaker. The patient had an episode of pericardial tamponade.    The

## 2018-11-20 LAB
ABSOLUTE EOS #: 0.11 K/UL (ref 0–0.44)
ABSOLUTE IMMATURE GRANULOCYTE: <0.03 K/UL (ref 0–0.3)
ABSOLUTE LYMPH #: 1.09 K/UL (ref 1.1–3.7)
ABSOLUTE MONO #: 0.65 K/UL (ref 0.1–1.2)
ANION GAP SERPL CALCULATED.3IONS-SCNC: 13 MMOL/L (ref 9–17)
BASOPHILS # BLD: 0 % (ref 0–2)
BASOPHILS ABSOLUTE: <0.03 K/UL (ref 0–0.2)
BUN BLDV-MCNC: 17 MG/DL (ref 8–23)
BUN/CREAT BLD: 22 (ref 9–20)
C-REACTIVE PROTEIN: 134 MG/L (ref 0–5)
CALCIUM SERPL-MCNC: 8.3 MG/DL (ref 8.6–10.4)
CHLORIDE BLD-SCNC: 100 MMOL/L (ref 98–107)
CO2: 22 MMOL/L (ref 20–31)
CREAT SERPL-MCNC: 0.78 MG/DL (ref 0.7–1.2)
DIFFERENTIAL TYPE: ABNORMAL
EOSINOPHILS RELATIVE PERCENT: 2 % (ref 1–4)
GFR AFRICAN AMERICAN: >60 ML/MIN
GFR NON-AFRICAN AMERICAN: >60 ML/MIN
GFR SERPL CREATININE-BSD FRML MDRD: ABNORMAL ML/MIN/{1.73_M2}
GFR SERPL CREATININE-BSD FRML MDRD: ABNORMAL ML/MIN/{1.73_M2}
GLUCOSE BLD-MCNC: 81 MG/DL (ref 70–99)
HCT VFR BLD CALC: 37.9 % (ref 40.7–50.3)
HEMOGLOBIN: 13.1 G/DL (ref 13–17)
IMMATURE GRANULOCYTES: 0 %
LYMPHOCYTES # BLD: 16 % (ref 24–43)
MCH RBC QN AUTO: 29.3 PG (ref 25.2–33.5)
MCHC RBC AUTO-ENTMCNC: 34.6 G/DL (ref 28.4–34.8)
MCV RBC AUTO: 84.8 FL (ref 82.6–102.9)
MONOCYTES # BLD: 10 % (ref 3–12)
NRBC AUTOMATED: 0 PER 100 WBC
PDW BLD-RTO: 13.1 % (ref 11.8–14.4)
PLATELET # BLD: 155 K/UL (ref 138–453)
PLATELET ESTIMATE: ABNORMAL
PMV BLD AUTO: 9.6 FL (ref 8.1–13.5)
POTASSIUM SERPL-SCNC: 3.8 MMOL/L (ref 3.7–5.3)
RBC # BLD: 4.47 M/UL (ref 4.21–5.77)
RBC # BLD: ABNORMAL 10*6/UL
SEG NEUTROPHILS: 72 % (ref 36–65)
SEGMENTED NEUTROPHILS ABSOLUTE COUNT: 4.89 K/UL (ref 1.5–8.1)
SODIUM BLD-SCNC: 135 MMOL/L (ref 135–144)
WBC # BLD: 6.8 K/UL (ref 3.5–11.3)
WBC # BLD: ABNORMAL 10*3/UL

## 2018-11-20 PROCEDURE — 86140 C-REACTIVE PROTEIN: CPT

## 2018-11-20 PROCEDURE — 6370000000 HC RX 637 (ALT 250 FOR IP): Performed by: SURGERY

## 2018-11-20 PROCEDURE — 99231 SBSQ HOSP IP/OBS SF/LOW 25: CPT | Performed by: INTERNAL MEDICINE

## 2018-11-20 PROCEDURE — 80048 BASIC METABOLIC PNL TOTAL CA: CPT

## 2018-11-20 PROCEDURE — 6360000002 HC RX W HCPCS: Performed by: SURGERY

## 2018-11-20 PROCEDURE — 6360000002 HC RX W HCPCS: Performed by: FAMILY MEDICINE

## 2018-11-20 PROCEDURE — 36415 COLL VENOUS BLD VENIPUNCTURE: CPT

## 2018-11-20 PROCEDURE — 87505 NFCT AGENT DETECTION GI: CPT

## 2018-11-20 PROCEDURE — 83630 LACTOFERRIN FECAL (QUAL): CPT

## 2018-11-20 PROCEDURE — 6370000000 HC RX 637 (ALT 250 FOR IP): Performed by: INTERNAL MEDICINE

## 2018-11-20 PROCEDURE — 85025 COMPLETE CBC W/AUTO DIFF WBC: CPT

## 2018-11-20 PROCEDURE — 1200000000 HC SEMI PRIVATE

## 2018-11-20 RX ADMIN — SALINE NASAL SPRAY 1 SPRAY: 1.5 SOLUTION NASAL at 21:49

## 2018-11-20 RX ADMIN — METRONIDAZOLE 500 MG: 250 TABLET ORAL at 21:10

## 2018-11-20 RX ADMIN — CEFDINIR 300 MG: 300 CAPSULE ORAL at 09:31

## 2018-11-20 RX ADMIN — METRONIDAZOLE 500 MG: 250 TABLET ORAL at 14:56

## 2018-11-20 RX ADMIN — MONTELUKAST SODIUM 10 MG: 10 TABLET, FILM COATED ORAL at 09:31

## 2018-11-20 RX ADMIN — ATORVASTATIN CALCIUM 10 MG: 10 TABLET, FILM COATED ORAL at 09:31

## 2018-11-20 RX ADMIN — TAMSULOSIN HYDROCHLORIDE 0.4 MG: 0.4 CAPSULE ORAL at 09:31

## 2018-11-20 RX ADMIN — LEVOTHYROXINE SODIUM 112 MCG: 0.11 TABLET ORAL at 07:18

## 2018-11-20 RX ADMIN — ALLOPURINOL 300 MG: 300 TABLET ORAL at 09:31

## 2018-11-20 RX ADMIN — ENOXAPARIN SODIUM 80 MG: 80 INJECTION SUBCUTANEOUS at 09:32

## 2018-11-20 RX ADMIN — CEFDINIR 300 MG: 300 CAPSULE ORAL at 21:10

## 2018-11-20 RX ADMIN — METRONIDAZOLE 500 MG: 250 TABLET ORAL at 06:26

## 2018-11-20 RX ADMIN — ONDANSETRON 4 MG: 2 INJECTION INTRAMUSCULAR; INTRAVENOUS at 17:49

## 2018-11-20 RX ADMIN — Medication 1 CAPSULE: at 09:31

## 2018-11-20 ASSESSMENT — PAIN SCALES - GENERAL: PAINLEVEL_OUTOF10: 0

## 2018-11-20 NOTE — PLAN OF CARE
Problem: Pain:  Goal: Pain level will decrease  Pain level will decrease   Outcome: Ongoing  Pain being monitored . Patient encouraged to use pain scale when rating pain. Patient using pain medications and non medication techniques to relieve pain. Patient comfortable at this time. Pain being monitored and patient being monitored for inconstancy. Patient comfortable at this time. Pain being monitored and patient being monitored for inconstancy. Problem: Falls - Risk of:  Goal: Will remain free from falls  Will remain free from falls   Outcome: Ongoing  Patient is alert and oriented and has demonstrated the use of using the call light for assistance before getting up. Education has been provided to defer the use of the bed alarm and/or restraint free alarm as the patient has shown competency in calling for assistance and verbalizing the risk. Problem: Bowel/Gastric:  Goal: Bowel function will improve  Bowel function will improve   Outcome: Ongoing  Monitoring and recording bowel movements. Patient to inform staff of constipation or diarrhea. Will document size,color and amount. Problem: Fluid Volume:  Goal: Maintenance of adequate hydration will improve  Maintenance of adequate hydration will improve   Outcome: Ongoing  Monitoring fluid intake and output. Patient vital signs are WNL at this time. Problem: Sensory:  Goal: Pain level will decrease  Pain level will decrease   Outcome: Ongoing  Pain being monitored . Patient encouraged to use pain scale when rating pain. Patient using pain medications and non medication techniques to relieve pain. Patient comfortable at this time. Pain being monitored and patient being monitored for inconstancy. Patient comfortable at this time. Pain being monitored and patient being monitored for inconstancy.

## 2018-11-20 NOTE — CONSULTS
(ZYLOPRIM) 300 MG tablet Take 300 mg by mouth daily.  levothyroxine (SYNTHROID) 112 MCG tablet Take 112 mcg by mouth Daily. Allergies as of 11/16/2018 - Review Complete 11/16/2018   Allergen Reaction Noted    Amoxicillin-pot clavulanate Other (See Comments) 12/19/2014    Ciprofloxacin Other (See Comments) 04/02/2013    Amoxicillin Rash 04/02/2013         Review of systems:   General: Completed and, except as mentioned above, was negative or noncontributory  Psychological:  Completed and, except as mentioned above, was negative or noncontributory  Ophthalmic:  Completed and, except as mentioned above, was negative or noncontributory  ENT:  Completed and, except as mentioned above, was negative or noncontributory  Allergy and Immunology:  Completed and, except as mentioned above, was negative or noncontributory  Hematological and Lymphatic:  Completed and, except as mentioned above, was negative or noncontributory  Endocrine: Completed and, except as mentioned above, was negative or noncontributory  Breast:  Completed and, except as mentioned above, was negative or noncontributory  Respiratory:  Completed and, except as mentioned above, was negative or noncontributory  Cardiovascular:  Completed and, except as mentioned above, was negative or noncontributory  Gastrointestinal: See HPI  Genito-Urinary:  Completed and, except as mentioned above, was negative or noncontributory  Musculoskeletal:  Completed and, except as mentioned above, was negative or noncontributory  Neurological:  Completed and, except as mentioned above, was negative or noncontributory  Dermatological:  Completed and, except as mentioned above, was negative or noncontributory      Physical exam:  Constitutional - Well-developed, well-nourished,  in no acute distress. Vital signs, height and weight are as documented above.     Mental Status /Psychiatric - alert, oriented to person, place, and time, normal mood, behavior, speech,

## 2018-11-21 VITALS
HEART RATE: 78 BPM | DIASTOLIC BLOOD PRESSURE: 78 MMHG | BODY MASS INDEX: 30.16 KG/M2 | RESPIRATION RATE: 16 BRPM | TEMPERATURE: 98.5 F | OXYGEN SATURATION: 98 % | SYSTOLIC BLOOD PRESSURE: 132 MMHG | HEIGHT: 65 IN | WEIGHT: 181 LBS

## 2018-11-21 LAB
ABSOLUTE EOS #: 0.15 K/UL (ref 0–0.44)
ABSOLUTE IMMATURE GRANULOCYTE: <0.03 K/UL (ref 0–0.3)
ABSOLUTE LYMPH #: 1.34 K/UL (ref 1.1–3.7)
ABSOLUTE MONO #: 0.58 K/UL (ref 0.1–1.2)
ALBUMIN SERPL-MCNC: 3.5 G/DL (ref 3.5–5.2)
ALBUMIN/GLOBULIN RATIO: 1.3 (ref 1–2.5)
ALP BLD-CCNC: 65 U/L (ref 40–129)
ALT SERPL-CCNC: 18 U/L (ref 5–41)
ANION GAP SERPL CALCULATED.3IONS-SCNC: 14 MMOL/L (ref 9–17)
AST SERPL-CCNC: 26 U/L
BASOPHILS # BLD: 0 % (ref 0–2)
BASOPHILS ABSOLUTE: <0.03 K/UL (ref 0–0.2)
BILIRUB SERPL-MCNC: 0.92 MG/DL (ref 0.3–1.2)
BUN BLDV-MCNC: 13 MG/DL (ref 8–23)
BUN/CREAT BLD: 17 (ref 9–20)
CALCIUM SERPL-MCNC: 8.6 MG/DL (ref 8.6–10.4)
CAMPYLOBACTER PCR: NORMAL
CHLORIDE BLD-SCNC: 98 MMOL/L (ref 98–107)
CHOLESTEROL/HDL RATIO: 2.3
CHOLESTEROL: 100 MG/DL
CO2: 25 MMOL/L (ref 20–31)
CREAT SERPL-MCNC: 0.78 MG/DL (ref 0.7–1.2)
DIFFERENTIAL TYPE: ABNORMAL
EOSINOPHILS RELATIVE PERCENT: 3 % (ref 1–4)
GFR AFRICAN AMERICAN: >60 ML/MIN
GFR NON-AFRICAN AMERICAN: >60 ML/MIN
GFR SERPL CREATININE-BSD FRML MDRD: ABNORMAL ML/MIN/{1.73_M2}
GFR SERPL CREATININE-BSD FRML MDRD: ABNORMAL ML/MIN/{1.73_M2}
GLUCOSE BLD-MCNC: 99 MG/DL (ref 70–99)
HCT VFR BLD CALC: 40.3 % (ref 40.7–50.3)
HDLC SERPL-MCNC: 44 MG/DL
HEMOGLOBIN: 13.8 G/DL (ref 13–17)
IMMATURE GRANULOCYTES: 0 %
LACTOFERRIN, QUAL: ABNORMAL
LDL CHOLESTEROL: 36 MG/DL (ref 0–130)
LYMPHOCYTES # BLD: 26 % (ref 24–43)
MCH RBC QN AUTO: 29.1 PG (ref 25.2–33.5)
MCHC RBC AUTO-ENTMCNC: 34.2 G/DL (ref 28.4–34.8)
MCV RBC AUTO: 84.8 FL (ref 82.6–102.9)
MONOCYTES # BLD: 11 % (ref 3–12)
NRBC AUTOMATED: 0 PER 100 WBC
PDW BLD-RTO: 13 % (ref 11.8–14.4)
PLATELET # BLD: 171 K/UL (ref 138–453)
PLATELET ESTIMATE: ABNORMAL
PMV BLD AUTO: 9 FL (ref 8.1–13.5)
POTASSIUM SERPL-SCNC: 3.5 MMOL/L (ref 3.7–5.3)
RBC # BLD: 4.75 M/UL (ref 4.21–5.77)
RBC # BLD: ABNORMAL 10*6/UL
SALMONELLA PCR: NORMAL
SEG NEUTROPHILS: 60 % (ref 36–65)
SEGMENTED NEUTROPHILS ABSOLUTE COUNT: 3.06 K/UL (ref 1.5–8.1)
SHIGATOXIN GENE PCR: NORMAL
SHIGELLA SP PCR: NORMAL
SODIUM BLD-SCNC: 137 MMOL/L (ref 135–144)
SPECIMEN: NORMAL
TOTAL PROTEIN: 6.2 G/DL (ref 6.4–8.3)
TRIGL SERPL-MCNC: 100 MG/DL
TSH SERPL DL<=0.05 MIU/L-ACNC: 2.39 MIU/L (ref 0.3–5)
VLDLC SERPL CALC-MCNC: NORMAL MG/DL (ref 1–30)
WBC # BLD: 5.2 K/UL (ref 3.5–11.3)
WBC # BLD: ABNORMAL 10*3/UL

## 2018-11-21 PROCEDURE — 36415 COLL VENOUS BLD VENIPUNCTURE: CPT

## 2018-11-21 PROCEDURE — 6370000000 HC RX 637 (ALT 250 FOR IP): Performed by: SURGERY

## 2018-11-21 PROCEDURE — 80061 LIPID PANEL: CPT

## 2018-11-21 PROCEDURE — 80053 COMPREHEN METABOLIC PANEL: CPT

## 2018-11-21 PROCEDURE — 84443 ASSAY THYROID STIM HORMONE: CPT

## 2018-11-21 PROCEDURE — 99231 SBSQ HOSP IP/OBS SF/LOW 25: CPT | Performed by: SURGERY

## 2018-11-21 PROCEDURE — 85025 COMPLETE CBC W/AUTO DIFF WBC: CPT

## 2018-11-21 RX ORDER — CEFDINIR 300 MG/1
300 CAPSULE ORAL 2 TIMES DAILY
Qty: 16 CAPSULE | Refills: 0 | Status: SHIPPED | OUTPATIENT
Start: 2018-11-21 | End: 2018-11-29

## 2018-11-21 RX ORDER — ONDANSETRON 4 MG/1
4 TABLET, FILM COATED ORAL EVERY 8 HOURS PRN
Qty: 20 TABLET | Refills: 0 | Status: SHIPPED | OUTPATIENT
Start: 2018-11-21 | End: 2019-03-15 | Stop reason: ALTCHOICE

## 2018-11-21 RX ORDER — METRONIDAZOLE 500 MG/1
500 TABLET ORAL 3 TIMES DAILY
Qty: 24 TABLET | Refills: 0 | Status: SHIPPED | OUTPATIENT
Start: 2018-11-21 | End: 2018-11-29

## 2018-11-21 RX ADMIN — Medication 1 CAPSULE: at 08:37

## 2018-11-21 RX ADMIN — CEFDINIR 300 MG: 300 CAPSULE ORAL at 08:37

## 2018-11-21 RX ADMIN — TAMSULOSIN HYDROCHLORIDE 0.4 MG: 0.4 CAPSULE ORAL at 08:37

## 2018-11-21 RX ADMIN — LEVOTHYROXINE SODIUM 112 MCG: 0.11 TABLET ORAL at 07:04

## 2018-11-21 RX ADMIN — MONTELUKAST SODIUM 10 MG: 10 TABLET, FILM COATED ORAL at 08:37

## 2018-11-21 RX ADMIN — ATORVASTATIN CALCIUM 10 MG: 10 TABLET, FILM COATED ORAL at 08:37

## 2018-11-21 RX ADMIN — ALLOPURINOL 300 MG: 300 TABLET ORAL at 08:37

## 2018-11-21 RX ADMIN — METRONIDAZOLE 500 MG: 250 TABLET ORAL at 05:27

## 2018-11-21 ASSESSMENT — PAIN SCALES - GENERAL: PAINLEVEL_OUTOF10: 0

## 2018-11-21 NOTE — PROGRESS NOTES
11/18/18- call from [place]    Increased CRP over 200, new WBC 11k. I called RN, she told me he had fever last night 012. Ordered stat ct a/p ordered po/iv contrast for progress study. May need to transfer over here to [place] [id number]. Await CT. Dr. Tim Nice  8:42am  ------------------------------------------------  Addendum 11:49 am    Repeat CT reviewed. I spoke with Dr. Froilan Macedo. His pain is stable, no pain meds taken since yesterday, ambulating. Spoke with RN. Her assessment is that he is stable, just tired, no diaphoresis or writhing in pain or signs of instability. Incentive spirometry ordered. Admitting physician Dr. Ever Cabral saw patient yesterday Sivan Garcia around 6:30 pm, note still pending. No abscess or free air, will continue IV abx and recheck labs in am. No infectious disease specialist available, I checked. I spoke with Dr. Raheem Rhodes patient's cardiologist and he is kind enough to check on him tomorrow for me as I am in [place] until back in [facility] and the [facility] on Wednesday. If condition worsens, will need to bring him to [address] [place] [address] in [place].     Dr. Tim Nice
Addendum:    Spoke with Dr. Jane Olmstead. Loose stools. C diff negative. On po flagyl. Hold off on immodium. Likely due to colitis/contrast from 2 CTs. Check stool cultures. Also since improving, d/c lovenox and restart Eliquis in am, cardio notes/recs appreciated.
Lele Brown called at this time for a GI Consult order.
Monica Leigh wanted RN to contact  about restarting his eliquis, per  to continue lovenox with new orders. Informed Randy Grullon of this.
Nutrition Assessment    Type and Reason for Visit: Reassess, Patient Education    Nutrition Recommendations:   1. Continue current diet. 2. Progress to low fiber diet as tolerated. Nutrition Assessment: Improving nutritional status R/T altered GI-diverticulitis, Hx IBS AEB diet progression to clear liquid diet. Labs wnl. Active BS. + BM 11/19. Dr. Asif Barth states his wife would like to speak to me about the diet he will need to follow. Diet discussed with his wife, I reviewed low and high fiber diets with her, she acknowledged understanding and was able to demonstrate good recall. He is feeling better, had eaten jello so far. Reports doing well. Nutrition Risk Level:  Moderate      Objective Information:  · Nutrition-Focused Physical Findings: Pt appears well  · Wound Type: None  · Current Nutrition Therapies:  · Oral Diet Orders: Clear Liquid   · Oral Diet intake: Unable to assess (currently eating breakfast)  · Oral Nutrition Supplement (ONS) Orders: None  · Anthropometric Measures:  · Ht: 5' 5\" (165.1 cm)   · Current Body Wt: 186 lb (84.4 kg)  · Admission Body Wt: 179 lb 6.4 oz (81.4 kg)  · Usual Body Wt: 181 lb (82.1 kg)  · % Weight Change:  ,  3.6% acute gain since admission  · Ideal Body Wt: 136 lb (61.7 kg), % Ideal Body 135%  · Adjusted Body Wt: 148 lb (67.1 kg), body weight adjusted for  (obesity)  · BMI Classification: BMI 30.0 - 34.9 Obese Class I  Recent Labs      11/18/18   0525  11/19/18   0520  11/20/18   0620   NA  138  137  135   K  4.1  3.9  3.8   CL  103  102  100   CO2  21  23  22   BUN  21  18  17   CREATININE  0.97  0.90  0.78   GLUCOSE  93  93  81   GFR                                          Lab Results   Component Value Date    LABALBU 4.3 04/07/2017    LABALBU 3.3 01/21/2012      Nutrition Interventions:   Continue current diet (Progress to low fiber diet as tolerated)  Continued Inpatient Monitoring, Education Completed, Coordination of Care (low and high fiber
Patient refusing telemetry for transfer out onto the floor.
Pt refused am pills, states he does not feel up to taking them at this time.
Spoke with Dr Kristin Powell on phone and she states to continue with lovenox for now and ok to give dose today.
intake: NPO  · Oral Nutrition Supplement (ONS) Orders: None  · Anthropometric Measures:  · Ht: 5' 5\" (165.1 cm)   · Current Body Wt: 183 lb (83 kg)  · Admission Body Wt: 179 lb 6.4 oz (81.4 kg)  · Usual Body Wt: 181 lb (82.1 kg)  · Ideal Body Wt: 136 lb (61.7 kg), % Ideal Body 135%  · Adjusted Body Wt: 148 lb (67.1 kg), body weight adjusted for  (obesity)  · BMI Classification: BMI 30.0 - 34.9 Obese Class I  Recent Labs      11/17/18   0505  11/18/18   0525  11/19/18   0520   NA  138  138  137   K  3.9  4.1  3.9   CL  99  103  102   CO2  26  21  23   BUN  16  21  18   CREATININE  1.08  0.97  0.90   GLUCOSE  112*  93  93   GFR                                          Lab Results   Component Value Date    LABALBU 4.3 04/07/2017    LABALBU 3.3 01/21/2012      Nutrition Interventions:   Continue NPO (Progress to low fiber diet when medically appropriate)  Continued Inpatient Monitoring, Coordination of Care, Education Needed (Pt will need low fiber diet education when diet progressed and he is feeling better)    Nutrition Evaluation:   · Evaluation: Goals set   · Goals: return of oral intakes    · Monitoring: Nutrition Progression, Meal Intake, Weight, Pertinent Labs, Patient/Family Education      Electronically signed by Uriel Peralta RD, ALEXANDRU on 11/19/18 at 8:00 AM    Contact Number: [id number]

## 2018-12-05 ENCOUNTER — OFFICE VISIT (OUTPATIENT)
Dept: SURGERY | Age: 68
End: 2018-12-05
Payer: MEDICARE

## 2018-12-05 VITALS
HEART RATE: 86 BPM | SYSTOLIC BLOOD PRESSURE: 119 MMHG | RESPIRATION RATE: 20 BRPM | WEIGHT: 183 LBS | HEIGHT: 65 IN | TEMPERATURE: 98.2 F | BODY MASS INDEX: 30.49 KG/M2 | DIASTOLIC BLOOD PRESSURE: 85 MMHG

## 2018-12-05 DIAGNOSIS — K52.9 COLITIS: Primary | ICD-10-CM

## 2018-12-05 PROCEDURE — 4040F PNEUMOC VAC/ADMIN/RCVD: CPT | Performed by: SURGERY

## 2018-12-05 PROCEDURE — 1111F DSCHRG MED/CURRENT MED MERGE: CPT | Performed by: SURGERY

## 2018-12-05 PROCEDURE — 1123F ACP DISCUSS/DSCN MKR DOCD: CPT | Performed by: SURGERY

## 2018-12-05 PROCEDURE — 1036F TOBACCO NON-USER: CPT | Performed by: SURGERY

## 2018-12-05 PROCEDURE — G8484 FLU IMMUNIZE NO ADMIN: HCPCS | Performed by: SURGERY

## 2018-12-05 PROCEDURE — 3017F COLORECTAL CA SCREEN DOC REV: CPT | Performed by: SURGERY

## 2018-12-05 PROCEDURE — 1101F PT FALLS ASSESS-DOCD LE1/YR: CPT | Performed by: SURGERY

## 2018-12-05 PROCEDURE — G8427 DOCREV CUR MEDS BY ELIG CLIN: HCPCS | Performed by: SURGERY

## 2018-12-05 PROCEDURE — 99213 OFFICE O/P EST LOW 20 MIN: CPT | Performed by: SURGERY

## 2018-12-05 PROCEDURE — G8419 CALC BMI OUT NRM PARAM NOF/U: HCPCS | Performed by: SURGERY

## 2019-01-16 ENCOUNTER — TELEPHONE (OUTPATIENT)
Dept: SURGERY | Age: 69
End: 2019-01-16

## 2019-01-16 RX ORDER — VALSARTAN 80 MG/1
80 TABLET ORAL DAILY
Qty: 90 TABLET | Refills: 5 | Status: SHIPPED | OUTPATIENT
Start: 2019-01-16 | End: 2019-01-17 | Stop reason: SDUPTHER

## 2019-01-16 RX ORDER — VALSARTAN 80 MG/1
80 TABLET ORAL DAILY
Qty: 30 TABLET | Refills: 5 | Status: SHIPPED | OUTPATIENT
Start: 2019-01-16 | End: 2019-01-16 | Stop reason: SDUPTHER

## 2019-01-17 ENCOUNTER — TELEPHONE (OUTPATIENT)
Dept: CARDIOLOGY | Age: 69
End: 2019-01-17

## 2019-01-17 ENCOUNTER — TELEPHONE (OUTPATIENT)
Dept: SURGERY | Age: 69
End: 2019-01-17

## 2019-01-17 RX ORDER — VALSARTAN 80 MG/1
80 TABLET ORAL DAILY
Qty: 90 TABLET | Refills: 3 | Status: SHIPPED | OUTPATIENT
Start: 2019-01-17 | End: 2021-06-23 | Stop reason: ALTCHOICE

## 2019-02-16 DIAGNOSIS — J45.21 ASTHMA IN ADULT, MILD INTERMITTENT, WITH ACUTE EXACERBATION: ICD-10-CM

## 2019-02-18 RX ORDER — MONTELUKAST SODIUM 10 MG/1
10 TABLET ORAL DAILY
Qty: 90 TABLET | Refills: 3 | Status: SHIPPED | OUTPATIENT
Start: 2019-02-18 | End: 2021-08-09

## 2019-02-27 ENCOUNTER — TELEPHONE (OUTPATIENT)
Dept: SURGERY | Age: 69
End: 2019-02-27

## 2019-03-15 ENCOUNTER — NURSE ONLY (OUTPATIENT)
Dept: CARDIOLOGY | Age: 69
End: 2019-03-15
Payer: MEDICARE

## 2019-03-15 VITALS
WEIGHT: 179 LBS | BODY MASS INDEX: 29.82 KG/M2 | DIASTOLIC BLOOD PRESSURE: 102 MMHG | RESPIRATION RATE: 16 BRPM | HEART RATE: 87 BPM | OXYGEN SATURATION: 98 % | HEIGHT: 65 IN | SYSTOLIC BLOOD PRESSURE: 143 MMHG

## 2019-03-15 DIAGNOSIS — I10 ESSENTIAL HYPERTENSION: ICD-10-CM

## 2019-03-15 DIAGNOSIS — Z95.0 PACEMAKER: ICD-10-CM

## 2019-03-15 DIAGNOSIS — I49.5 SSS (SICK SINUS SYNDROME) (HCC): ICD-10-CM

## 2019-03-15 DIAGNOSIS — I48.0 PAF (PAROXYSMAL ATRIAL FIBRILLATION) (HCC): ICD-10-CM

## 2019-03-15 DIAGNOSIS — T82.111A PACEMAKER MALFUNCTION, INITIAL ENCOUNTER: Primary | ICD-10-CM

## 2019-03-15 PROCEDURE — 99214 OFFICE O/P EST MOD 30 MIN: CPT | Performed by: FAMILY MEDICINE

## 2019-03-18 ENCOUNTER — TELEPHONE (OUTPATIENT)
Dept: CARDIOLOGY | Age: 69
End: 2019-03-18

## 2019-09-04 ENCOUNTER — OFFICE VISIT (OUTPATIENT)
Dept: CARDIOLOGY | Age: 69
End: 2019-09-04
Payer: MEDICARE

## 2019-09-04 VITALS
DIASTOLIC BLOOD PRESSURE: 86 MMHG | HEIGHT: 65 IN | HEART RATE: 87 BPM | WEIGHT: 180.8 LBS | OXYGEN SATURATION: 96 % | SYSTOLIC BLOOD PRESSURE: 133 MMHG | RESPIRATION RATE: 16 BRPM | BODY MASS INDEX: 30.12 KG/M2

## 2019-09-04 DIAGNOSIS — I50.32 CHRONIC DIASTOLIC CHF (CONGESTIVE HEART FAILURE), NYHA CLASS 2 (HCC): ICD-10-CM

## 2019-09-04 DIAGNOSIS — I49.5 SSS (SICK SINUS SYNDROME) (HCC): ICD-10-CM

## 2019-09-04 DIAGNOSIS — I48.0 PAF (PAROXYSMAL ATRIAL FIBRILLATION) (HCC): Primary | ICD-10-CM

## 2019-09-04 DIAGNOSIS — Z95.0 PACEMAKER: ICD-10-CM

## 2019-09-04 DIAGNOSIS — I10 ESSENTIAL HYPERTENSION: ICD-10-CM

## 2019-09-04 DIAGNOSIS — R42 LIGHTHEADED: ICD-10-CM

## 2019-09-04 PROCEDURE — 1036F TOBACCO NON-USER: CPT | Performed by: FAMILY MEDICINE

## 2019-09-04 PROCEDURE — 3017F COLORECTAL CA SCREEN DOC REV: CPT | Performed by: FAMILY MEDICINE

## 2019-09-04 PROCEDURE — 4040F PNEUMOC VAC/ADMIN/RCVD: CPT | Performed by: FAMILY MEDICINE

## 2019-09-04 PROCEDURE — 99214 OFFICE O/P EST MOD 30 MIN: CPT | Performed by: FAMILY MEDICINE

## 2019-09-04 PROCEDURE — G8427 DOCREV CUR MEDS BY ELIG CLIN: HCPCS | Performed by: FAMILY MEDICINE

## 2019-09-04 PROCEDURE — 1123F ACP DISCUSS/DSCN MKR DOCD: CPT | Performed by: FAMILY MEDICINE

## 2019-09-04 PROCEDURE — G8417 CALC BMI ABV UP PARAM F/U: HCPCS | Performed by: FAMILY MEDICINE

## 2019-09-10 ENCOUNTER — NURSE ONLY (OUTPATIENT)
Dept: CARDIOLOGY | Age: 69
End: 2019-09-10
Payer: MEDICARE

## 2019-09-10 DIAGNOSIS — I49.5 SSS (SICK SINUS SYNDROME) (HCC): ICD-10-CM

## 2019-09-10 DIAGNOSIS — Z95.0 PACEMAKER: Primary | ICD-10-CM

## 2019-09-10 PROCEDURE — 93294 REM INTERROG EVL PM/LDLS PM: CPT | Performed by: FAMILY MEDICINE

## 2019-11-24 ENCOUNTER — APPOINTMENT (OUTPATIENT)
Dept: CT IMAGING | Age: 69
End: 2019-11-24
Payer: MEDICARE

## 2019-11-24 ENCOUNTER — HOSPITAL ENCOUNTER (EMERGENCY)
Age: 69
Discharge: HOME | End: 2019-11-24
Attending: EMERGENCY MEDICINE
Payer: MEDICARE

## 2019-11-24 VITALS
SYSTOLIC BLOOD PRESSURE: 149 MMHG | OXYGEN SATURATION: 98 % | TEMPERATURE: 100.9 F | HEART RATE: 110 BPM | RESPIRATION RATE: 18 BRPM | DIASTOLIC BLOOD PRESSURE: 99 MMHG

## 2019-11-24 DIAGNOSIS — B34.9 VIRAL SYNDROME: Primary | ICD-10-CM

## 2019-11-24 LAB
ABSOLUTE EOS #: <0.03 K/UL (ref 0–0.44)
ABSOLUTE IMMATURE GRANULOCYTE: 0.03 K/UL (ref 0–0.3)
ABSOLUTE LYMPH #: 0.7 K/UL (ref 1.1–3.7)
ABSOLUTE MONO #: 0.43 K/UL (ref 0.1–1.2)
ALBUMIN SERPL-MCNC: 4.5 G/DL (ref 3.5–5.2)
ALBUMIN/GLOBULIN RATIO: 1.6 (ref 1–2.5)
ALP BLD-CCNC: 82 U/L (ref 40–129)
ALT SERPL-CCNC: 30 U/L (ref 5–41)
ANION GAP SERPL CALCULATED.3IONS-SCNC: 15 MMOL/L (ref 9–17)
AST SERPL-CCNC: 30 U/L
BASOPHILS # BLD: 0 % (ref 0–2)
BASOPHILS ABSOLUTE: <0.03 K/UL (ref 0–0.2)
BILIRUB SERPL-MCNC: 1.52 MG/DL (ref 0.3–1.2)
BILIRUBIN URINE: NEGATIVE
BUN BLDV-MCNC: 16 MG/DL (ref 8–23)
BUN/CREAT BLD: 16 (ref 9–20)
CALCIUM SERPL-MCNC: 9.4 MG/DL (ref 8.6–10.4)
CHLORIDE BLD-SCNC: 98 MMOL/L (ref 98–107)
CO2: 26 MMOL/L (ref 20–31)
COLOR: YELLOW
COMMENT UA: NORMAL
CREAT SERPL-MCNC: 1.02 MG/DL (ref 0.7–1.2)
DIFFERENTIAL TYPE: ABNORMAL
EOSINOPHILS RELATIVE PERCENT: 0 % (ref 1–4)
GFR AFRICAN AMERICAN: >60 ML/MIN
GFR NON-AFRICAN AMERICAN: >60 ML/MIN
GFR SERPL CREATININE-BSD FRML MDRD: ABNORMAL ML/MIN/{1.73_M2}
GFR SERPL CREATININE-BSD FRML MDRD: ABNORMAL ML/MIN/{1.73_M2}
GLUCOSE BLD-MCNC: 103 MG/DL (ref 70–99)
GLUCOSE URINE: NEGATIVE
HCT VFR BLD CALC: 45.4 % (ref 40.7–50.3)
HEMOGLOBIN: 15.9 G/DL (ref 13–17)
IMMATURE GRANULOCYTES: 0 %
KETONES, URINE: NEGATIVE
LACTIC ACID, WHOLE BLOOD: NORMAL MMOL/L (ref 0.7–2.1)
LACTIC ACID: 1.4 MMOL/L (ref 0.5–2.2)
LEUKOCYTE ESTERASE, URINE: NEGATIVE
LIPASE: 39 U/L (ref 13–60)
LYMPHOCYTES # BLD: 10 % (ref 24–43)
MCH RBC QN AUTO: 30.8 PG (ref 25.2–33.5)
MCHC RBC AUTO-ENTMCNC: 35 G/DL (ref 28.4–34.8)
MCV RBC AUTO: 87.8 FL (ref 82.6–102.9)
MONOCYTES # BLD: 6 % (ref 3–12)
NITRITE, URINE: NEGATIVE
NRBC AUTOMATED: 0 PER 100 WBC
PDW BLD-RTO: 12.7 % (ref 11.8–14.4)
PH UA: 7 (ref 5–9)
PLATELET # BLD: 162 K/UL (ref 138–453)
PLATELET ESTIMATE: ABNORMAL
PMV BLD AUTO: 9.5 FL (ref 8.1–13.5)
POTASSIUM SERPL-SCNC: 4.2 MMOL/L (ref 3.7–5.3)
PROTEIN UA: NEGATIVE
RBC # BLD: 5.17 M/UL (ref 4.21–5.77)
RBC # BLD: ABNORMAL 10*6/UL
SEG NEUTROPHILS: 84 % (ref 36–65)
SEGMENTED NEUTROPHILS ABSOLUTE COUNT: 5.61 K/UL (ref 1.5–8.1)
SODIUM BLD-SCNC: 139 MMOL/L (ref 135–144)
SPECIFIC GRAVITY UA: 1.01 (ref 1.01–1.02)
TOTAL PROTEIN: 7.4 G/DL (ref 6.4–8.3)
TURBIDITY: CLEAR
URINE HGB: NEGATIVE
UROBILINOGEN, URINE: NORMAL
WBC # BLD: 6.8 K/UL (ref 3.5–11.3)
WBC # BLD: ABNORMAL 10*3/UL

## 2019-11-24 PROCEDURE — 80053 COMPREHEN METABOLIC PANEL: CPT

## 2019-11-24 PROCEDURE — 2580000003 HC RX 258: Performed by: EMERGENCY MEDICINE

## 2019-11-24 PROCEDURE — 36415 COLL VENOUS BLD VENIPUNCTURE: CPT

## 2019-11-24 PROCEDURE — 87040 BLOOD CULTURE FOR BACTERIA: CPT

## 2019-11-24 PROCEDURE — 83605 ASSAY OF LACTIC ACID: CPT

## 2019-11-24 PROCEDURE — 99284 EMERGENCY DEPT VISIT MOD MDM: CPT

## 2019-11-24 PROCEDURE — 81003 URINALYSIS AUTO W/O SCOPE: CPT

## 2019-11-24 PROCEDURE — 6360000004 HC RX CONTRAST MEDICATION: Performed by: EMERGENCY MEDICINE

## 2019-11-24 PROCEDURE — 87086 URINE CULTURE/COLONY COUNT: CPT

## 2019-11-24 PROCEDURE — 74177 CT ABD & PELVIS W/CONTRAST: CPT

## 2019-11-24 PROCEDURE — 83690 ASSAY OF LIPASE: CPT

## 2019-11-24 PROCEDURE — 6370000000 HC RX 637 (ALT 250 FOR IP): Performed by: EMERGENCY MEDICINE

## 2019-11-24 PROCEDURE — 85025 COMPLETE CBC W/AUTO DIFF WBC: CPT

## 2019-11-24 RX ORDER — ACETAMINOPHEN 500 MG
1000 TABLET ORAL ONCE
Status: COMPLETED | OUTPATIENT
Start: 2019-11-24 | End: 2019-11-24

## 2019-11-24 RX ORDER — SODIUM CHLORIDE, SODIUM LACTATE, POTASSIUM CHLORIDE, CALCIUM CHLORIDE 600; 310; 30; 20 MG/100ML; MG/100ML; MG/100ML; MG/100ML
1000 INJECTION, SOLUTION INTRAVENOUS ONCE
Status: DISCONTINUED | OUTPATIENT
Start: 2019-11-24 | End: 2019-11-24

## 2019-11-24 RX ORDER — SODIUM CHLORIDE, SODIUM LACTATE, POTASSIUM CHLORIDE, CALCIUM CHLORIDE 600; 310; 30; 20 MG/100ML; MG/100ML; MG/100ML; MG/100ML
1000 INJECTION, SOLUTION INTRAVENOUS ONCE
Status: COMPLETED | OUTPATIENT
Start: 2019-11-24 | End: 2019-11-24

## 2019-11-24 RX ADMIN — ACETAMINOPHEN 1000 MG: 500 TABLET, FILM COATED ORAL at 21:18

## 2019-11-24 RX ADMIN — IOPAMIDOL 75 ML: 755 INJECTION, SOLUTION INTRAVENOUS at 22:00

## 2019-11-24 RX ADMIN — SODIUM CHLORIDE, POTASSIUM CHLORIDE, SODIUM LACTATE AND CALCIUM CHLORIDE 1000 ML: 600; 310; 30; 20 INJECTION, SOLUTION INTRAVENOUS at 21:18

## 2019-11-24 ASSESSMENT — PAIN SCALES - GENERAL: PAINLEVEL_OUTOF10: 2

## 2019-11-25 ENCOUNTER — APPOINTMENT (OUTPATIENT)
Dept: ULTRASOUND IMAGING | Age: 69
DRG: 866 | End: 2019-11-25
Payer: MEDICARE

## 2019-11-25 ENCOUNTER — HOSPITAL ENCOUNTER (INPATIENT)
Age: 69
LOS: 1 days | Discharge: HOME | DRG: 866 | End: 2019-11-26
Attending: EMERGENCY MEDICINE | Admitting: FAMILY MEDICINE
Payer: MEDICARE

## 2019-11-25 ENCOUNTER — APPOINTMENT (OUTPATIENT)
Dept: CT IMAGING | Age: 69
DRG: 866 | End: 2019-11-25
Payer: MEDICARE

## 2019-11-25 ENCOUNTER — APPOINTMENT (OUTPATIENT)
Dept: NUCLEAR MEDICINE | Age: 69
DRG: 866 | End: 2019-11-25
Payer: MEDICARE

## 2019-11-25 ENCOUNTER — APPOINTMENT (OUTPATIENT)
Dept: GENERAL RADIOLOGY | Age: 69
DRG: 866 | End: 2019-11-25
Payer: MEDICARE

## 2019-11-25 DIAGNOSIS — R55 NEAR SYNCOPE: Primary | ICD-10-CM

## 2019-11-25 DIAGNOSIS — R74.8 ELEVATED LIVER ENZYMES: ICD-10-CM

## 2019-11-25 DIAGNOSIS — R11.0 NAUSEA: ICD-10-CM

## 2019-11-25 PROBLEM — R10.84 GENERALIZED ABDOMINAL PAIN: Status: ACTIVE | Noted: 2019-11-25

## 2019-11-25 LAB
ABSOLUTE EOS #: <0.03 K/UL (ref 0–0.44)
ABSOLUTE IMMATURE GRANULOCYTE: <0.03 K/UL (ref 0–0.3)
ABSOLUTE LYMPH #: 1.26 K/UL (ref 1.1–3.7)
ABSOLUTE MONO #: 0.35 K/UL (ref 0.1–1.2)
ALBUMIN SERPL-MCNC: 3.9 G/DL (ref 3.5–5.2)
ALBUMIN/GLOBULIN RATIO: 1.5 (ref 1–2.5)
ALP BLD-CCNC: 72 U/L (ref 40–129)
ALT SERPL-CCNC: 25 U/L (ref 5–41)
ANION GAP SERPL CALCULATED.3IONS-SCNC: 14 MMOL/L (ref 9–17)
AST SERPL-CCNC: 24 U/L
BASOPHILS # BLD: 0 % (ref 0–2)
BASOPHILS ABSOLUTE: <0.03 K/UL (ref 0–0.2)
BILIRUB SERPL-MCNC: 1.76 MG/DL (ref 0.3–1.2)
BILIRUBIN DIRECT: 0.35 MG/DL
BILIRUBIN, INDIRECT: 1.41 MG/DL (ref 0–1)
BUN BLDV-MCNC: 16 MG/DL (ref 8–23)
BUN/CREAT BLD: 15 (ref 9–20)
CALCIUM SERPL-MCNC: 9.3 MG/DL (ref 8.6–10.4)
CHLORIDE BLD-SCNC: 99 MMOL/L (ref 98–107)
CO2: 23 MMOL/L (ref 20–31)
CREAT SERPL-MCNC: 1.04 MG/DL (ref 0.7–1.2)
DIFFERENTIAL TYPE: ABNORMAL
DIRECT EXAM: NORMAL
EKG ATRIAL RATE: 82 BPM
EKG P AXIS: 41 DEGREES
EKG P-R INTERVAL: 156 MS
EKG Q-T INTERVAL: 410 MS
EKG QRS DURATION: 136 MS
EKG QTC CALCULATION (BAZETT): 479 MS
EKG R AXIS: -14 DEGREES
EKG T AXIS: 3 DEGREES
EKG VENTRICULAR RATE: 82 BPM
EOSINOPHILS RELATIVE PERCENT: 0 % (ref 1–4)
GFR AFRICAN AMERICAN: >60 ML/MIN
GFR NON-AFRICAN AMERICAN: >60 ML/MIN
GFR SERPL CREATININE-BSD FRML MDRD: ABNORMAL ML/MIN/{1.73_M2}
GFR SERPL CREATININE-BSD FRML MDRD: ABNORMAL ML/MIN/{1.73_M2}
GLOBULIN: ABNORMAL G/DL (ref 1.5–3.8)
GLUCOSE BLD-MCNC: 124 MG/DL (ref 70–99)
HCT VFR BLD CALC: 42.7 % (ref 40.7–50.3)
HEMOGLOBIN: 14.3 G/DL (ref 13–17)
IMMATURE GRANULOCYTES: 0 %
LIPASE: 35 U/L (ref 13–60)
LYMPHOCYTES # BLD: 23 % (ref 24–43)
Lab: NORMAL
MAGNESIUM: 1.8 MG/DL (ref 1.6–2.6)
MCH RBC QN AUTO: 30.2 PG (ref 25.2–33.5)
MCHC RBC AUTO-ENTMCNC: 33.5 G/DL (ref 28.4–34.8)
MCV RBC AUTO: 90.3 FL (ref 82.6–102.9)
MONOCYTES # BLD: 6 % (ref 3–12)
NRBC AUTOMATED: 0 PER 100 WBC
PDW BLD-RTO: 12.8 % (ref 11.8–14.4)
PLATELET # BLD: 150 K/UL (ref 138–453)
PLATELET ESTIMATE: ABNORMAL
PMV BLD AUTO: 9.9 FL (ref 8.1–13.5)
POTASSIUM SERPL-SCNC: 3.8 MMOL/L (ref 3.7–5.3)
RBC # BLD: 4.73 M/UL (ref 4.21–5.77)
RBC # BLD: ABNORMAL 10*6/UL
SEG NEUTROPHILS: 71 % (ref 36–65)
SEGMENTED NEUTROPHILS ABSOLUTE COUNT: 3.97 K/UL (ref 1.5–8.1)
SODIUM BLD-SCNC: 136 MMOL/L (ref 135–144)
SPECIMEN DESCRIPTION: NORMAL
TOTAL CK: 224 U/L (ref 39–308)
TOTAL PROTEIN: 6.5 G/DL (ref 6.4–8.3)
TROPONIN INTERP: NORMAL
TROPONIN T: <0.03 NG/ML
TROPONIN, HIGH SENSITIVITY: NORMAL NG/L (ref 0–22)
WBC # BLD: 5.6 K/UL (ref 3.5–11.3)
WBC # BLD: ABNORMAL 10*3/UL

## 2019-11-25 PROCEDURE — 99285 EMERGENCY DEPT VISIT HI MDM: CPT

## 2019-11-25 PROCEDURE — 82550 ASSAY OF CK (CPK): CPT

## 2019-11-25 PROCEDURE — 85025 COMPLETE CBC W/AUTO DIFF WBC: CPT

## 2019-11-25 PROCEDURE — 6370000000 HC RX 637 (ALT 250 FOR IP): Performed by: PHYSICIAN ASSISTANT

## 2019-11-25 PROCEDURE — 93005 ELECTROCARDIOGRAM TRACING: CPT | Performed by: EMERGENCY MEDICINE

## 2019-11-25 PROCEDURE — 96361 HYDRATE IV INFUSION ADD-ON: CPT

## 2019-11-25 PROCEDURE — 80048 BASIC METABOLIC PNL TOTAL CA: CPT

## 2019-11-25 PROCEDURE — 80076 HEPATIC FUNCTION PANEL: CPT

## 2019-11-25 PROCEDURE — 6360000002 HC RX W HCPCS: Performed by: EMERGENCY MEDICINE

## 2019-11-25 PROCEDURE — 2580000003 HC RX 258: Performed by: EMERGENCY MEDICINE

## 2019-11-25 PROCEDURE — 3430000000 HC RX DIAGNOSTIC RADIOPHARMACEUTICAL: Performed by: FAMILY MEDICINE

## 2019-11-25 PROCEDURE — 1200000000 HC SEMI PRIVATE

## 2019-11-25 PROCEDURE — 6360000002 HC RX W HCPCS: Performed by: FAMILY MEDICINE

## 2019-11-25 PROCEDURE — 2580000003 HC RX 258: Performed by: FAMILY MEDICINE

## 2019-11-25 PROCEDURE — 70450 CT HEAD/BRAIN W/O DYE: CPT

## 2019-11-25 PROCEDURE — 71045 X-RAY EXAM CHEST 1 VIEW: CPT

## 2019-11-25 PROCEDURE — A9537 TC99M MEBROFENIN: HCPCS | Performed by: FAMILY MEDICINE

## 2019-11-25 PROCEDURE — 78227 HEPATOBIL SYST IMAGE W/DRUG: CPT

## 2019-11-25 PROCEDURE — 87804 INFLUENZA ASSAY W/OPTIC: CPT

## 2019-11-25 PROCEDURE — 93010 ELECTROCARDIOGRAM REPORT: CPT | Performed by: FAMILY MEDICINE

## 2019-11-25 PROCEDURE — 6370000000 HC RX 637 (ALT 250 FOR IP): Performed by: FAMILY MEDICINE

## 2019-11-25 PROCEDURE — 83690 ASSAY OF LIPASE: CPT

## 2019-11-25 PROCEDURE — 76705 ECHO EXAM OF ABDOMEN: CPT

## 2019-11-25 PROCEDURE — 83735 ASSAY OF MAGNESIUM: CPT

## 2019-11-25 PROCEDURE — 84484 ASSAY OF TROPONIN QUANT: CPT

## 2019-11-25 PROCEDURE — 96374 THER/PROPH/DIAG INJ IV PUSH: CPT

## 2019-11-25 RX ORDER — ATORVASTATIN CALCIUM 10 MG/1
10 TABLET, FILM COATED ORAL DAILY
Status: DISCONTINUED | OUTPATIENT
Start: 2019-11-25 | End: 2019-11-26 | Stop reason: HOSPADM

## 2019-11-25 RX ORDER — TAMSULOSIN HYDROCHLORIDE 0.4 MG/1
0.4 CAPSULE ORAL DAILY
Status: DISCONTINUED | OUTPATIENT
Start: 2019-11-25 | End: 2019-11-26 | Stop reason: HOSPADM

## 2019-11-25 RX ORDER — SODIUM CHLORIDE 0.9 % (FLUSH) 0.9 %
10 SYRINGE (ML) INJECTION PRN
Status: DISCONTINUED | OUTPATIENT
Start: 2019-11-25 | End: 2019-11-26 | Stop reason: HOSPADM

## 2019-11-25 RX ORDER — SODIUM CHLORIDE 9 MG/ML
INJECTION, SOLUTION INTRAVENOUS CONTINUOUS
Status: DISCONTINUED | OUTPATIENT
Start: 2019-11-25 | End: 2019-11-26 | Stop reason: HOSPADM

## 2019-11-25 RX ORDER — ALLOPURINOL 300 MG/1
300 TABLET ORAL DAILY
Status: DISCONTINUED | OUTPATIENT
Start: 2019-11-25 | End: 2019-11-26 | Stop reason: HOSPADM

## 2019-11-25 RX ORDER — LIDOCAINE 4 G/G
1 PATCH TOPICAL DAILY
Status: DISCONTINUED | OUTPATIENT
Start: 2019-11-25 | End: 2019-11-26 | Stop reason: HOSPADM

## 2019-11-25 RX ORDER — 0.9 % SODIUM CHLORIDE 0.9 %
1000 INTRAVENOUS SOLUTION INTRAVENOUS ONCE
Status: COMPLETED | OUTPATIENT
Start: 2019-11-25 | End: 2019-11-25

## 2019-11-25 RX ORDER — ONDANSETRON 2 MG/ML
4 INJECTION INTRAMUSCULAR; INTRAVENOUS EVERY 6 HOURS PRN
Status: DISCONTINUED | OUTPATIENT
Start: 2019-11-25 | End: 2019-11-26 | Stop reason: HOSPADM

## 2019-11-25 RX ORDER — VALSARTAN 80 MG/1
80 TABLET ORAL DAILY
Status: DISCONTINUED | OUTPATIENT
Start: 2019-11-25 | End: 2019-11-26 | Stop reason: HOSPADM

## 2019-11-25 RX ORDER — LEVOTHYROXINE SODIUM 112 UG/1
112 TABLET ORAL DAILY
Status: DISCONTINUED | OUTPATIENT
Start: 2019-11-25 | End: 2019-11-26 | Stop reason: HOSPADM

## 2019-11-25 RX ORDER — ACETAMINOPHEN 325 MG/1
650 TABLET ORAL EVERY 4 HOURS PRN
Status: DISCONTINUED | OUTPATIENT
Start: 2019-11-25 | End: 2019-11-26 | Stop reason: HOSPADM

## 2019-11-25 RX ORDER — ALBUTEROL SULFATE 90 UG/1
2 AEROSOL, METERED RESPIRATORY (INHALATION) EVERY 6 HOURS PRN
Status: DISCONTINUED | OUTPATIENT
Start: 2019-11-25 | End: 2019-11-26 | Stop reason: HOSPADM

## 2019-11-25 RX ORDER — SODIUM CHLORIDE 9 MG/ML
1000 INJECTION, SOLUTION INTRAVENOUS CONTINUOUS
Status: DISCONTINUED | OUTPATIENT
Start: 2019-11-25 | End: 2019-11-26 | Stop reason: HOSPADM

## 2019-11-25 RX ORDER — SODIUM CHLORIDE 0.9 % (FLUSH) 0.9 %
10 SYRINGE (ML) INJECTION EVERY 12 HOURS SCHEDULED
Status: DISCONTINUED | OUTPATIENT
Start: 2019-11-25 | End: 2019-11-26 | Stop reason: HOSPADM

## 2019-11-25 RX ORDER — MONTELUKAST SODIUM 10 MG/1
10 TABLET ORAL DAILY
Status: DISCONTINUED | OUTPATIENT
Start: 2019-11-25 | End: 2019-11-26 | Stop reason: HOSPADM

## 2019-11-25 RX ORDER — FLUTICASONE PROPIONATE 50 MCG
1 SPRAY, SUSPENSION (ML) NASAL 2 TIMES DAILY
Status: DISCONTINUED | OUTPATIENT
Start: 2019-11-25 | End: 2019-11-26 | Stop reason: HOSPADM

## 2019-11-25 RX ORDER — FLUTICASONE FUROATE AND VILANTEROL 200; 25 UG/1; UG/1
1 POWDER RESPIRATORY (INHALATION) DAILY
Status: DISCONTINUED | OUTPATIENT
Start: 2019-11-25 | End: 2019-11-26 | Stop reason: HOSPADM

## 2019-11-25 RX ORDER — PANTOPRAZOLE SODIUM 40 MG/1
40 TABLET, DELAYED RELEASE ORAL DAILY
Status: DISCONTINUED | OUTPATIENT
Start: 2019-11-25 | End: 2019-11-26 | Stop reason: HOSPADM

## 2019-11-25 RX ORDER — ONDANSETRON 2 MG/ML
4 INJECTION INTRAMUSCULAR; INTRAVENOUS ONCE
Status: COMPLETED | OUTPATIENT
Start: 2019-11-25 | End: 2019-11-25

## 2019-11-25 RX ADMIN — LEVOTHYROXINE SODIUM 112 MCG: 112 TABLET ORAL at 08:24

## 2019-11-25 RX ADMIN — SODIUM CHLORIDE 1000 ML: 9 INJECTION, SOLUTION INTRAVENOUS at 03:08

## 2019-11-25 RX ADMIN — APIXABAN 5 MG: 5 TABLET, FILM COATED ORAL at 21:01

## 2019-11-25 RX ADMIN — MEROPENEM 1 G: 1 INJECTION, POWDER, FOR SOLUTION INTRAVENOUS at 23:14

## 2019-11-25 RX ADMIN — TAMSULOSIN HYDROCHLORIDE 0.4 MG: 0.4 CAPSULE ORAL at 08:24

## 2019-11-25 RX ADMIN — FLUTICASONE PROPIONATE 1 SPRAY: 50 SPRAY, METERED NASAL at 21:00

## 2019-11-25 RX ADMIN — ONDANSETRON 4 MG: 2 INJECTION INTRAMUSCULAR; INTRAVENOUS at 02:54

## 2019-11-25 RX ADMIN — FLUTICASONE PROPIONATE 1 SPRAY: 50 SPRAY, METERED NASAL at 09:49

## 2019-11-25 RX ADMIN — ATORVASTATIN CALCIUM 10 MG: 10 TABLET, FILM COATED ORAL at 21:01

## 2019-11-25 RX ADMIN — MEROPENEM 1 G: 1 INJECTION, POWDER, FOR SOLUTION INTRAVENOUS at 08:26

## 2019-11-25 RX ADMIN — ONDANSETRON 4 MG: 2 INJECTION INTRAMUSCULAR; INTRAVENOUS at 15:17

## 2019-11-25 RX ADMIN — MONTELUKAST 10 MG: 10 TABLET, FILM COATED ORAL at 21:01

## 2019-11-25 RX ADMIN — MEROPENEM 1 G: 1 INJECTION, POWDER, FOR SOLUTION INTRAVENOUS at 16:09

## 2019-11-25 RX ADMIN — PANTOPRAZOLE SODIUM 40 MG: 40 TABLET, DELAYED RELEASE ORAL at 08:24

## 2019-11-25 RX ADMIN — SODIUM CHLORIDE 1.63 MCG: 9 INJECTION, SOLUTION INTRAVENOUS at 13:26

## 2019-11-25 RX ADMIN — SODIUM CHLORIDE: 9 INJECTION, SOLUTION INTRAVENOUS at 23:14

## 2019-11-25 RX ADMIN — Medication 5 MILLICURIE: at 12:30

## 2019-11-25 RX ADMIN — ALLOPURINOL 300 MG: 300 TABLET ORAL at 08:24

## 2019-11-25 RX ADMIN — SALINE NASAL SPRAY 2 SPRAY: 1.5 SOLUTION NASAL at 08:47

## 2019-11-25 RX ADMIN — APIXABAN 5 MG: 5 TABLET, FILM COATED ORAL at 08:24

## 2019-11-25 RX ADMIN — ACETAMINOPHEN 650 MG: 325 TABLET, FILM COATED ORAL at 16:09

## 2019-11-25 ASSESSMENT — PAIN DESCRIPTION - LOCATION
LOCATION: BACK

## 2019-11-25 ASSESSMENT — PAIN DESCRIPTION - ORIENTATION
ORIENTATION: LOWER
ORIENTATION: LOWER
ORIENTATION: LOWER;MID
ORIENTATION: LOWER

## 2019-11-25 ASSESSMENT — ENCOUNTER SYMPTOMS
SHORTNESS OF BREATH: 0
PHOTOPHOBIA: 0
NAUSEA: 1
VOMITING: 0
ABDOMINAL PAIN: 1
COUGH: 0

## 2019-11-25 ASSESSMENT — PAIN SCALES - GENERAL
PAINLEVEL_OUTOF10: 3
PAINLEVEL_OUTOF10: 0
PAINLEVEL_OUTOF10: 2
PAINLEVEL_OUTOF10: 3
PAINLEVEL_OUTOF10: 3

## 2019-11-25 ASSESSMENT — PAIN DESCRIPTION - PAIN TYPE: TYPE: ACUTE PAIN

## 2019-11-26 VITALS
BODY MASS INDEX: 30.57 KG/M2 | OXYGEN SATURATION: 98 % | TEMPERATURE: 98 F | DIASTOLIC BLOOD PRESSURE: 82 MMHG | SYSTOLIC BLOOD PRESSURE: 138 MMHG | RESPIRATION RATE: 15 BRPM | WEIGHT: 183.5 LBS | HEART RATE: 90 BPM | HEIGHT: 65 IN

## 2019-11-26 LAB
ALBUMIN SERPL-MCNC: 3.7 G/DL (ref 3.5–5.2)
ALBUMIN/GLOBULIN RATIO: 1.5 (ref 1–2.5)
ALP BLD-CCNC: 65 U/L (ref 40–129)
ALT SERPL-CCNC: 20 U/L (ref 5–41)
ANION GAP SERPL CALCULATED.3IONS-SCNC: 9 MMOL/L (ref 9–17)
AST SERPL-CCNC: 23 U/L
BILIRUB SERPL-MCNC: 0.78 MG/DL (ref 0.3–1.2)
BUN BLDV-MCNC: 14 MG/DL (ref 8–23)
BUN/CREAT BLD: 13 (ref 9–20)
CALCIUM SERPL-MCNC: 8.2 MG/DL (ref 8.6–10.4)
CHLORIDE BLD-SCNC: 105 MMOL/L (ref 98–107)
CO2: 26 MMOL/L (ref 20–31)
CREAT SERPL-MCNC: 1.06 MG/DL (ref 0.7–1.2)
CULTURE: NO GROWTH
GFR AFRICAN AMERICAN: >60 ML/MIN
GFR NON-AFRICAN AMERICAN: >60 ML/MIN
GFR SERPL CREATININE-BSD FRML MDRD: ABNORMAL ML/MIN/{1.73_M2}
GFR SERPL CREATININE-BSD FRML MDRD: ABNORMAL ML/MIN/{1.73_M2}
GLUCOSE BLD-MCNC: 107 MG/DL (ref 70–99)
HCT VFR BLD CALC: 39.7 % (ref 40.7–50.3)
HEMOGLOBIN: 13 G/DL (ref 13–17)
Lab: NORMAL
MCH RBC QN AUTO: 29.7 PG (ref 25.2–33.5)
MCHC RBC AUTO-ENTMCNC: 32.7 G/DL (ref 28.4–34.8)
MCV RBC AUTO: 90.8 FL (ref 82.6–102.9)
NRBC AUTOMATED: 0 PER 100 WBC
PDW BLD-RTO: 13 % (ref 11.8–14.4)
PLATELET # BLD: ABNORMAL K/UL (ref 138–453)
PLATELET, FLUORESCENCE: 123 K/UL (ref 138–453)
PLATELET, IMMATURE FRACTION: 1.4 % (ref 1.1–10.3)
PMV BLD AUTO: ABNORMAL FL (ref 8.1–13.5)
POTASSIUM SERPL-SCNC: 3.8 MMOL/L (ref 3.7–5.3)
RBC # BLD: 4.37 M/UL (ref 4.21–5.77)
SODIUM BLD-SCNC: 140 MMOL/L (ref 135–144)
SPECIMEN DESCRIPTION: NORMAL
TOTAL PROTEIN: 6.2 G/DL (ref 6.4–8.3)
WBC # BLD: 4.5 K/UL (ref 3.5–11.3)

## 2019-11-26 PROCEDURE — 85027 COMPLETE CBC AUTOMATED: CPT

## 2019-11-26 PROCEDURE — 36415 COLL VENOUS BLD VENIPUNCTURE: CPT

## 2019-11-26 PROCEDURE — 85055 RETICULATED PLATELET ASSAY: CPT

## 2019-11-26 PROCEDURE — 6370000000 HC RX 637 (ALT 250 FOR IP): Performed by: PHYSICIAN ASSISTANT

## 2019-11-26 PROCEDURE — 80053 COMPREHEN METABOLIC PANEL: CPT

## 2019-11-26 PROCEDURE — 99222 1ST HOSP IP/OBS MODERATE 55: CPT | Performed by: FAMILY MEDICINE

## 2019-11-26 PROCEDURE — 93280 PM DEVICE PROGR EVAL DUAL: CPT | Performed by: FAMILY MEDICINE

## 2019-11-26 PROCEDURE — 6370000000 HC RX 637 (ALT 250 FOR IP): Performed by: FAMILY MEDICINE

## 2019-11-26 RX ADMIN — ACETAMINOPHEN 650 MG: 325 TABLET, FILM COATED ORAL at 00:59

## 2019-11-26 RX ADMIN — FLUTICASONE PROPIONATE 1 SPRAY: 50 SPRAY, METERED NASAL at 09:44

## 2019-11-26 RX ADMIN — PANTOPRAZOLE SODIUM 40 MG: 40 TABLET, DELAYED RELEASE ORAL at 07:24

## 2019-11-26 RX ADMIN — ALLOPURINOL 300 MG: 300 TABLET ORAL at 09:43

## 2019-11-26 RX ADMIN — LEVOTHYROXINE SODIUM 112 MCG: 112 TABLET ORAL at 07:22

## 2019-11-26 RX ADMIN — TAMSULOSIN HYDROCHLORIDE 0.4 MG: 0.4 CAPSULE ORAL at 09:44

## 2019-11-26 RX ADMIN — APIXABAN 5 MG: 5 TABLET, FILM COATED ORAL at 09:44

## 2019-11-26 ASSESSMENT — PAIN DESCRIPTION - LOCATION
LOCATION: BACK
LOCATION: BACK

## 2019-11-26 ASSESSMENT — PAIN SCALES - GENERAL
PAINLEVEL_OUTOF10: 3
PAINLEVEL_OUTOF10: 4
PAINLEVEL_OUTOF10: 4

## 2019-11-26 ASSESSMENT — PAIN DESCRIPTION - ORIENTATION: ORIENTATION: LOWER

## 2019-11-29 LAB
CULTURE: NORMAL
CULTURE: NORMAL
Lab: NORMAL
Lab: NORMAL
SPECIMEN DESCRIPTION: NORMAL
SPECIMEN DESCRIPTION: NORMAL

## 2019-12-02 PROBLEM — R55 SYNCOPE AND COLLAPSE: Status: ACTIVE | Noted: 2019-12-02

## 2020-03-03 ENCOUNTER — NURSE ONLY (OUTPATIENT)
Dept: CARDIOLOGY | Age: 70
End: 2020-03-03
Payer: MEDICARE

## 2020-03-03 PROCEDURE — 93294 REM INTERROG EVL PM/LDLS PM: CPT | Performed by: FAMILY MEDICINE

## 2020-03-09 ENCOUNTER — TELEPHONE (OUTPATIENT)
Dept: CARDIOLOGY | Age: 70
End: 2020-03-09

## 2020-06-04 ENCOUNTER — HOSPITAL ENCOUNTER (EMERGENCY)
Age: 70
Discharge: HOME | End: 2020-06-05
Attending: EMERGENCY MEDICINE
Payer: MEDICARE

## 2020-06-04 VITALS
OXYGEN SATURATION: 98 % | HEIGHT: 64 IN | RESPIRATION RATE: 18 BRPM | TEMPERATURE: 98.5 F | DIASTOLIC BLOOD PRESSURE: 92 MMHG | WEIGHT: 174 LBS | HEART RATE: 78 BPM | SYSTOLIC BLOOD PRESSURE: 180 MMHG | BODY MASS INDEX: 29.71 KG/M2

## 2020-06-04 LAB
ABSOLUTE EOS #: 0.06 K/UL (ref 0–0.44)
ABSOLUTE IMMATURE GRANULOCYTE: <0.03 K/UL (ref 0–0.3)
ABSOLUTE LYMPH #: 1.76 K/UL (ref 1.1–3.7)
ABSOLUTE MONO #: 0.39 K/UL (ref 0.1–1.2)
ALBUMIN SERPL-MCNC: 4.5 G/DL (ref 3.5–5.2)
ALBUMIN/GLOBULIN RATIO: 1.8 (ref 1–2.5)
ALP BLD-CCNC: 105 U/L (ref 40–129)
ALT SERPL-CCNC: 40 U/L (ref 5–41)
ANION GAP SERPL CALCULATED.3IONS-SCNC: 8 MMOL/L (ref 9–17)
AST SERPL-CCNC: 62 U/L
BASOPHILS # BLD: 0 % (ref 0–2)
BASOPHILS ABSOLUTE: <0.03 K/UL (ref 0–0.2)
BILIRUB SERPL-MCNC: 0.81 MG/DL (ref 0.3–1.2)
BILIRUBIN DIRECT: <0.08 MG/DL
BILIRUBIN, INDIRECT: ABNORMAL MG/DL (ref 0–1)
BUN BLDV-MCNC: 16 MG/DL (ref 8–23)
BUN/CREAT BLD: 15 (ref 9–20)
CALCIUM SERPL-MCNC: 9.5 MG/DL (ref 8.6–10.4)
CHLORIDE BLD-SCNC: 101 MMOL/L (ref 98–107)
CO2: 28 MMOL/L (ref 20–31)
CREAT SERPL-MCNC: 1.06 MG/DL (ref 0.7–1.2)
DIFFERENTIAL TYPE: NORMAL
EOSINOPHILS RELATIVE PERCENT: 1 % (ref 1–4)
GFR AFRICAN AMERICAN: >60 ML/MIN
GFR NON-AFRICAN AMERICAN: >60 ML/MIN
GFR SERPL CREATININE-BSD FRML MDRD: ABNORMAL ML/MIN/{1.73_M2}
GFR SERPL CREATININE-BSD FRML MDRD: ABNORMAL ML/MIN/{1.73_M2}
GLOBULIN: ABNORMAL G/DL (ref 1.5–3.8)
GLUCOSE BLD-MCNC: 123 MG/DL (ref 70–99)
HCT VFR BLD CALC: 44.2 % (ref 40.7–50.3)
HEMOGLOBIN: 14.7 G/DL (ref 13–17)
IMMATURE GRANULOCYTES: 0 %
LIPASE: 62 U/L (ref 13–60)
LYMPHOCYTES # BLD: 33 % (ref 24–43)
MCH RBC QN AUTO: 29.3 PG (ref 25.2–33.5)
MCHC RBC AUTO-ENTMCNC: 33.3 G/DL (ref 28.4–34.8)
MCV RBC AUTO: 88 FL (ref 82.6–102.9)
MONOCYTES # BLD: 7 % (ref 3–12)
NRBC AUTOMATED: 0 PER 100 WBC
PDW BLD-RTO: 13.3 % (ref 11.8–14.4)
PLATELET # BLD: 161 K/UL (ref 138–453)
PLATELET ESTIMATE: NORMAL
PMV BLD AUTO: 9.5 FL (ref 8.1–13.5)
POTASSIUM SERPL-SCNC: 4.7 MMOL/L (ref 3.7–5.3)
RBC # BLD: 5.02 M/UL (ref 4.21–5.77)
RBC # BLD: NORMAL 10*6/UL
SEG NEUTROPHILS: 59 % (ref 36–65)
SEGMENTED NEUTROPHILS ABSOLUTE COUNT: 3.05 K/UL (ref 1.5–8.1)
SODIUM BLD-SCNC: 137 MMOL/L (ref 135–144)
TOTAL PROTEIN: 7 G/DL (ref 6.4–8.3)
WBC # BLD: 5.3 K/UL (ref 3.5–11.3)
WBC # BLD: NORMAL 10*3/UL

## 2020-06-04 PROCEDURE — 96375 TX/PRO/DX INJ NEW DRUG ADDON: CPT

## 2020-06-04 PROCEDURE — 85025 COMPLETE CBC W/AUTO DIFF WBC: CPT

## 2020-06-04 PROCEDURE — 80048 BASIC METABOLIC PNL TOTAL CA: CPT

## 2020-06-04 PROCEDURE — 99283 EMERGENCY DEPT VISIT LOW MDM: CPT

## 2020-06-04 PROCEDURE — 6360000002 HC RX W HCPCS: Performed by: EMERGENCY MEDICINE

## 2020-06-04 PROCEDURE — 2500000003 HC RX 250 WO HCPCS: Performed by: EMERGENCY MEDICINE

## 2020-06-04 PROCEDURE — 80076 HEPATIC FUNCTION PANEL: CPT

## 2020-06-04 PROCEDURE — 96372 THER/PROPH/DIAG INJ SC/IM: CPT

## 2020-06-04 PROCEDURE — 83690 ASSAY OF LIPASE: CPT

## 2020-06-04 PROCEDURE — 96374 THER/PROPH/DIAG INJ IV PUSH: CPT

## 2020-06-04 RX ORDER — DIPHENHYDRAMINE HYDROCHLORIDE 50 MG/ML
50 INJECTION INTRAMUSCULAR; INTRAVENOUS ONCE
Status: COMPLETED | OUTPATIENT
Start: 2020-06-04 | End: 2020-06-04

## 2020-06-04 RX ORDER — METHYLPREDNISOLONE SODIUM SUCCINATE 125 MG/2ML
125 INJECTION, POWDER, LYOPHILIZED, FOR SOLUTION INTRAMUSCULAR; INTRAVENOUS ONCE
Status: COMPLETED | OUTPATIENT
Start: 2020-06-04 | End: 2020-06-04

## 2020-06-04 RX ORDER — EPINEPHRINE 1 MG/ML(1)
0.3 AMPUL (ML) INJECTION ONCE
Status: COMPLETED | OUTPATIENT
Start: 2020-06-04 | End: 2020-06-04

## 2020-06-04 RX ADMIN — METHYLPREDNISOLONE SODIUM SUCCINATE 125 MG: 125 INJECTION, POWDER, FOR SOLUTION INTRAMUSCULAR; INTRAVENOUS at 21:47

## 2020-06-04 RX ADMIN — FAMOTIDINE 40 MG: 10 INJECTION, SOLUTION INTRAVENOUS at 21:48

## 2020-06-04 RX ADMIN — EPINEPHRINE 0.3 MG: 1 INJECTION, SOLUTION, CONCENTRATE INTRAVENOUS at 21:48

## 2020-06-04 RX ADMIN — DIPHENHYDRAMINE HYDROCHLORIDE 50 MG: 50 INJECTION, SOLUTION INTRAMUSCULAR; INTRAVENOUS at 21:48

## 2020-06-05 ASSESSMENT — ENCOUNTER SYMPTOMS
ABDOMINAL PAIN: 1
NAUSEA: 1
COUGH: 0
SHORTNESS OF BREATH: 1
VOMITING: 0
SORE THROAT: 0

## 2020-06-05 NOTE — ED PROVIDER NOTES
Hypertension Sister         SOCIAL HISTORY       Social History     Socioeconomic History    Marital status:      Spouse name: None    Number of children: None    Years of education: None    Highest education level: None   Occupational History    None   Social Needs    Financial resource strain: None    Food insecurity     Worry: None     Inability: None    Transportation needs     Medical: None     Non-medical: None   Tobacco Use    Smoking status: Former Smoker     Packs/day: 0.50     Last attempt to quit: 1981     Years since quittin.5    Smokeless tobacco: Former User   Substance and Sexual Activity    Alcohol use: Yes     Alcohol/week: 1.0 standard drinks     Types: 1 Glasses of wine per week     Comment: occ    Drug use: Never    Sexual activity: None   Lifestyle    Physical activity     Days per week: None     Minutes per session: None    Stress: None   Relationships    Social connections     Talks on phone: None     Gets together: None     Attends Congregational service: None     Active member of club or organization: None     Attends meetings of clubs or organizations: None     Relationship status: None    Intimate partner violence     Fear of current or ex partner: None     Emotionally abused: None     Physically abused: None     Forced sexual activity: None   Other Topics Concern    None   Social History Narrative    None       SCREENINGS           PHYSICAL EXAM    (up to 7 for level 4, 8 or more for level 5)   [facility]    Physical Exam  Vitals signs and nursing note reviewed. Constitutional:       General: He is not in acute distress. Appearance: Normal appearance. He is not ill-appearing, toxic-appearing or diaphoretic. HENT:      Head: Normocephalic and atraumatic. Nose: Nose normal. No congestion or rhinorrhea. Mouth/Throat:      Mouth: Mucous membranes are moist.      Pharynx: Oropharynx is clear.  No oropharyngeal exudate or posterior oropharyngeal Thought content normal.         Judgment: Judgment normal.         DIAGNOSTIC RESULTS     EKG (Per Emergency Physician):       RADIOLOGY (Per Emergency Physician): Interpretation per the Radiologist below, if available at the time of this note:  No results found. ED BEDSIDE ULTRASOUND:   Performed by ED Physician - none    LABS:  Labs Reviewed   BASIC METABOLIC PANEL W/ REFLEX TO MG FOR LOW K - Abnormal; Notable for the following components:       Result Value    Glucose 123 (*)     Anion Gap 8 (*)     All other components within normal limits   HEPATIC FUNCTION PANEL - Abnormal; Notable for the following components:    AST 62 (*)     All other components within normal limits   LIPASE - Abnormal; Notable for the following components:    Lipase 62 (*)     All other components within normal limits   CBC WITH AUTO DIFFERENTIAL        All other labs were within normal range or not returned as of this dictation. EMERGENCY DEPARTMENT COURSE and DIFFERENTIAL DIAGNOSIS/MDM:   Vitals:    Vitals:    06/04/20 2129 06/04/20 2202   BP: (!) 164/96 (!) 180/92   Pulse: 78    Resp: 18    Temp: 98.5 °F (36.9 °C)    TempSrc: Oral    SpO2: 99% 98%   Weight: 174 lb (78.9 kg)    Height: 5' 4\" (1.626 m)        Medications   methylPREDNISolone sodium (SOLU-MEDROL) injection 125 mg (125 mg Intravenous Given 6/4/20 2147)   diphenhydrAMINE (BENADRYL) injection 50 mg (50 mg Intravenous Given 6/4/20 2148)   famotidine (PEPCID) injection 40 mg (40 mg Intravenous Given 6/4/20 2148)   EPINEPHrine (EPINEPHrine HCL) 1 mg/mL injection 0.3 mg (0.3 mg Intramuscular Given 6/4/20 2148)       MDM. Patient arrived to the ER with a diffuse rash most consistent with allergic reaction. However he did not have angioedema changes and was in no respiratory distress.   His abdominal pain had resolved prior to arrival and I feel this is most like related to intestinal distention from the allergic reaction versus true GI event or gallbladder

## 2020-06-06 ENCOUNTER — CARE COORDINATION (OUTPATIENT)
Dept: CARE COORDINATION | Age: 70
End: 2020-06-06

## 2020-06-06 NOTE — CARE COORDINATION
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       Emerald Samano [facility]   Medication Assistance  Mónica Espinoza and [employer]    (T)[phone]  (T)[phone]

## 2020-06-10 ENCOUNTER — HOSPITAL ENCOUNTER (OUTPATIENT)
Dept: LAB | Age: 70
Discharge: HOME | End: 2020-06-10
Payer: MEDICARE

## 2020-06-10 PROCEDURE — 86003 ALLG SPEC IGE CRUDE XTRC EA: CPT

## 2020-06-10 PROCEDURE — 36415 COLL VENOUS BLD VENIPUNCTURE: CPT

## 2020-06-12 LAB
ALLERGEN CRAB IGE: <0.34 KU/L (ref 0–0.34)
ALLERGEN LOBSTER IGE: <0.34 KU/L (ref 0–0.34)
ALLERGEN OYSTER IGE: <0.34 KU/L (ref 0–0.34)
ALLERGEN SCALLOP IGE: <0.34 KU/L (ref 0–0.34)
ALLERGEN TUNA IGE: <0.34 KU/L (ref 0–0.34)
SHRIMP: <0.34 KU/L (ref 0–0.34)

## 2020-11-03 PROBLEM — R55 SYNCOPE AND COLLAPSE: Status: RESOLVED | Noted: 2019-12-02 | Resolved: 2020-11-03

## 2020-11-07 RX ORDER — TIZANIDINE 2 MG/1
2 TABLET ORAL 2 TIMES DAILY PRN
Qty: 50 TABLET | Refills: 0 | Status: SHIPPED | OUTPATIENT
Start: 2020-11-07 | End: 2021-08-09

## 2020-11-07 RX ORDER — PREDNISONE 20 MG/1
TABLET ORAL
Qty: 20 TABLET | Refills: 0 | Status: SHIPPED | OUTPATIENT
Start: 2020-11-07 | End: 2021-08-09

## 2020-11-07 RX ORDER — TRAMADOL HYDROCHLORIDE 50 MG/1
50 TABLET ORAL EVERY 6 HOURS PRN
Qty: 12 TABLET | Refills: 0 | Status: SHIPPED | OUTPATIENT
Start: 2020-11-07 | End: 2020-11-10

## 2020-12-04 ENCOUNTER — HOSPITAL ENCOUNTER (OUTPATIENT)
Dept: LAB | Age: 70
Setting detail: SPECIMEN
Discharge: HOME | End: 2020-12-04
Payer: MEDICARE

## 2020-12-04 ENCOUNTER — HOSPITAL ENCOUNTER (OUTPATIENT)
Dept: LAB | Age: 70
Discharge: HOME | End: 2020-12-04
Payer: MEDICARE

## 2020-12-04 LAB — SARS-COV-2 ANTIBODY, TOTAL: NEGATIVE

## 2020-12-04 PROCEDURE — 36415 COLL VENOUS BLD VENIPUNCTURE: CPT

## 2020-12-04 PROCEDURE — C9803 HOPD COVID-19 SPEC COLLECT: HCPCS

## 2020-12-04 PROCEDURE — 86769 SARS-COV-2 COVID-19 ANTIBODY: CPT

## 2020-12-04 PROCEDURE — U0003 INFECTIOUS AGENT DETECTION BY NUCLEIC ACID (DNA OR RNA); SEVERE ACUTE RESPIRATORY SYNDROME CORONAVIRUS 2 (SARS-COV-2) (CORONAVIRUS DISEASE [COVID-19]), AMPLIFIED PROBE TECHNIQUE, MAKING USE OF HIGH THROUGHPUT TECHNOLOGIES AS DESCRIBED BY CMS-2020-01-R: HCPCS

## 2020-12-06 LAB — SARS-COV-2, NAA: NOT DETECTED

## 2021-03-09 ENCOUNTER — NURSE ONLY (OUTPATIENT)
Dept: CARDIOLOGY | Age: 71
End: 2021-03-09
Payer: MEDICARE

## 2021-03-09 DIAGNOSIS — Z95.0 PACEMAKER: Primary | ICD-10-CM

## 2021-03-09 DIAGNOSIS — I49.5 SSS (SICK SINUS SYNDROME) (HCC): ICD-10-CM

## 2021-03-09 PROCEDURE — 93294 REM INTERROG EVL PM/LDLS PM: CPT | Performed by: FAMILY MEDICINE

## 2021-06-23 ENCOUNTER — OFFICE VISIT (OUTPATIENT)
Dept: CARDIOLOGY | Age: 71
End: 2021-06-23
Payer: MEDICARE

## 2021-06-23 VITALS
WEIGHT: 175.8 LBS | HEIGHT: 64 IN | SYSTOLIC BLOOD PRESSURE: 128 MMHG | RESPIRATION RATE: 18 BRPM | HEART RATE: 81 BPM | DIASTOLIC BLOOD PRESSURE: 72 MMHG | BODY MASS INDEX: 30.01 KG/M2 | OXYGEN SATURATION: 100 %

## 2021-06-23 DIAGNOSIS — I50.32 CHRONIC DIASTOLIC CHF (CONGESTIVE HEART FAILURE), NYHA CLASS 2 (HCC): ICD-10-CM

## 2021-06-23 DIAGNOSIS — R55 VASOVAGAL SYNCOPE: ICD-10-CM

## 2021-06-23 DIAGNOSIS — I10 ESSENTIAL HYPERTENSION: ICD-10-CM

## 2021-06-23 DIAGNOSIS — I48.0 PAF (PAROXYSMAL ATRIAL FIBRILLATION) (HCC): Primary | ICD-10-CM

## 2021-06-23 DIAGNOSIS — Z95.0 PACEMAKER: ICD-10-CM

## 2021-06-23 DIAGNOSIS — I49.5 SSS (SICK SINUS SYNDROME) (HCC): ICD-10-CM

## 2021-06-23 PROCEDURE — 93280 PM DEVICE PROGR EVAL DUAL: CPT | Performed by: FAMILY MEDICINE

## 2021-06-23 PROCEDURE — G8427 DOCREV CUR MEDS BY ELIG CLIN: HCPCS | Performed by: FAMILY MEDICINE

## 2021-06-23 PROCEDURE — 4040F PNEUMOC VAC/ADMIN/RCVD: CPT | Performed by: FAMILY MEDICINE

## 2021-06-23 PROCEDURE — 3017F COLORECTAL CA SCREEN DOC REV: CPT | Performed by: FAMILY MEDICINE

## 2021-06-23 PROCEDURE — 99211 OFF/OP EST MAY X REQ PHY/QHP: CPT | Performed by: FAMILY MEDICINE

## 2021-06-23 PROCEDURE — 99215 OFFICE O/P EST HI 40 MIN: CPT | Performed by: FAMILY MEDICINE

## 2021-06-23 PROCEDURE — 1036F TOBACCO NON-USER: CPT | Performed by: FAMILY MEDICINE

## 2021-06-23 PROCEDURE — G8417 CALC BMI ABV UP PARAM F/U: HCPCS | Performed by: FAMILY MEDICINE

## 2021-06-23 PROCEDURE — 1123F ACP DISCUSS/DSCN MKR DOCD: CPT | Performed by: FAMILY MEDICINE

## 2021-06-23 PROCEDURE — 93005 ELECTROCARDIOGRAM TRACING: CPT | Performed by: FAMILY MEDICINE

## 2021-06-23 NOTE — PATIENT INSTRUCTIONS
SURVEY:    You may be receiving a survey from [employer] regarding your visit today. Please complete the survey to enable us to provide the highest quality of care to you and your family. If you cannot score us a very good on any question, please call the office to discuss how we could have made your experience a very good one. Thank you. Your MA today was Phuong Hoang!

## 2021-06-23 NOTE — PROGRESS NOTES
Toney Hall am scribing for and in the presence of Rae Meyer. Timothy CLARKE, MS, F.A.C.C. Patient: Isabella Burrell  : 1950  Date of Visit: 2021    REASON FOR VISIT / CONSULTATION: 1 Year Follow Up (EKG done today. HX: PAF,CHF, lightheaded, pacer,SSS Pt is here for follow up he states doing well. Denies:CP,SOB,lightheaded/dizziness,palp)      History of Present Illness:        Dear Dr Madison Faith,     I had the pleasure of seeing Isabella Burrell in my office today. Mr. Antonio Barahona is a 79 y.o. male  with a history of a dual chamber Biotronik pacemaker for sick sinus syndrome. His echocardiogram on 2017 showed an ejection fraction of 60%. He also has a history of paroxysmal atrial fibrillation which has been fairly rare in the past and has been treated with Eliquis. Normal stress and echo [place]     Mr. Antonio Barahona is here for follow up today. He reported that he did have syncope in February [place] which occurred suddenly after getting up to use the bathroom. He had some pretty severe episodes of lightheadedness and dizziness with recurrent short episodes of loss of consciousness. He was admitted and he had stress test and echo done then and was told both where normal. He denies any further episodes of syncope since then. He is staying very active. He walks a lot between 2-4 miles daily depending on foot pain and works out in his yard. He denied any current or recent chest pain, abdominal pain, bleeding problems, problems with his medications or any other concerns at this time. Exercise Tolerance: Mr. Antonio Barahona reports that he has a fairly good exercise tolerance. His says that he could walk 1 mile without developing chest discomfort or significant shortness of breath.     PAST MEDICAL HISTORY:        Past Medical History:   Diagnosis Date    Asthma     BPH (benign prostatic hyperplasia)     Fracture of L1 vertebra ([facility] [address] 10/2010    GERD (gastroesophageal reflux disease)     Gout     High altitude pulmonary edema 2010    Hyperlipidemia     Hypertension     Radiculopathy of leg     left leg    Sleep apnea     Treated C-PAP 10 years    Thyroid disease     Wrist fracture, left 10/2010           CURRENT ALLERGIES: Amoxicillin-pot clavulanate, Ciprofloxacin, and Amoxicillin REVIEW OF SYSTEMS: 14 systems were reviewed. Pertinent positives and negatives as above, all else negative. Past Surgical History:   Procedure Laterality Date    COLON SURGERY      perirectal abcess    PACEMAKER INSERTION      Social History:  Social History     Tobacco Use    Smoking status: Former Smoker     Packs/day: 0.50     Years: 8.00     Pack years: 4.00     Quit date: 1981     Years since quittin.5    Smokeless tobacco: Former User   Vaping Use    Vaping Use: Never used   Substance Use Topics    Alcohol use: Yes     Alcohol/week: 1.0 standard drinks     Types: 1 Glasses of wine per week     Comment: occ    Drug use: Never        CURRENT MEDICATIONS:        Outpatient Medications Marked as Taking for the 21 encounter (Office Visit) with Maya Barkley MD   Medication Sig Dispense Refill    apixaban (ELIQUIS) 5 MG TABS tablet Take 1 tablet by mouth 2 times daily 180 tablet 3    fluticasone-salmeterol (ADVAIR DISKUS) 100-50 MCG/DOSE diskus inhaler Inhale 1 puff into the lungs every 12 hours 60 each 2    montelukast (SINGULAIR) 10 MG tablet TAKE 1 TABLET BY MOUTH  DAILY 90 tablet 3    albuterol sulfate  (90 BASE) MCG/ACT inhaler Inhale 2 puffs into the lungs every 6 hours as needed for Wheezing      atorvastatin (LIPITOR) 10 MG tablet Take 1 tablet by mouth daily. 30 tablet 3    allopurinol (ZYLOPRIM) 300 MG tablet Take 300 mg by mouth daily.       levothyroxine (SYNTHROID) 112 MCG tablet Take 125 mcg by mouth Daily          FAMILY HISTORY: family history includes COPD in his father; Hypertension in his sister; Pacemaker in his mother; Seizures in his mother. Physical Examination:     /72 (Site: Right Upper Arm, Position: Sitting, Cuff Size: Medium Adult)   Pulse 81   Resp 18   Ht 5' 4\" (1.626 m)   Wt 175 lb 12.8 oz (79.7 kg)   SpO2 100%   BMI 30.18 kg/m²  Body mass index is 30.18 kg/m². Constitutional: He is oriented to person, place, and time. He appears well-developed and well-nourished. In no acute distress. HEENT: Normocephalic and atraumatic. No JVD present. Carotid bruit is not present. No mass and no thyromegaly present. No lymphadenopathy present. Cardiovascular: Normal rate, regular rhythm, normal heart sounds. Exam reveals no gallop and no friction rubs. A I/VI systolic murmur was heard maximally at the apex. Pulmonary/Chest: Effort normal and breath sounds normal. No respiratory distress. He has no wheezes, rhonchi or rales. Abdominal: Soft, non-tender. Bowel sounds and aorta are normal. He exhibits no organomegaly, mass or bruit. Extremities: Trace lower extremity edema. No cyanosis and no clubbing. Pulses are 2+ radial and carotid pulses. 2+ dorsalis pedis and posterior tibial pulses bilaterally. Neurological: He is alert and oriented to person, place, and time. No evidence of gross cranial nerve deficit. Coordination appeared normal.   Skin: Skin is warm and dry. There is no rash or diaphoresis. Psychiatric: He has a normal mood and affect. His speech is normal and behavior is normal.        MOST RECENT LABS ON RECORD:   Lab Results   Component Value Date    WBC 5.3 06/04/2020    HGB 14.7 06/04/2020    HCT 44.2 06/04/2020     06/04/2020    CHOL 100 11/21/2018    TRIG 100 11/21/2018    HDL 44 11/21/2018    ALT 40 06/04/2020    AST 62 (H) 06/04/2020     06/04/2020    K 4.7 06/04/2020     06/04/2020    CREATININE 1.06 06/04/2020    BUN 16 06/04/2020    CO2 28 06/04/2020    TSH 2.39 11/21/2018    PSA 2.30 12/22/2015    LABA1C 5.1 01/21/2012       ASSESSMENT:     1.  PAF (paroxysmal atrial fibrillation) ([facility] [address]    2. Chronic diastolic CHF (congestive heart failure), NYHA class 2 ([facility] [address]    3. Essential hypertension    4. Pacemaker    5. SSS (sick sinus syndrome) ([facility])    6. Vasovagal syncope       PLAN:          Paroxysmal Atrial Fibrillation: Rate Control, no recurrent episodes of atrial fibrillation on pacemaker check today at least over the last several months. Beta Blocker: Not able to take due to a history of fatigue as well as orthostatic hypotension. [id number] Score for Atrial Fibrillation Stroke Risk   Risk   Factors  Component Value   C CHF Yes 1   H HTN Yes 1   A2 Age >= 76 No,  (69 y.o.) 0   D DM No 0   S2 Prior Stroke/TIA No 0   V Vascular Disease No 0   A Age 74-69 Yes,  (69 y.o.) 1   Sc Sex male 0    [id number]  Score  3   Score last updated 2/70/46 40:25 AM EDT  Click here for a link to the UpToDate guideline \"Atrial Fibrillation: Anticoagulation therapy to prevent embolization  Disclaimer: Risk Score calculation is dependent on accuracy of patient problem list and past encounter diagnosis. Stroke Risk: His CHADS2-VASc score is 3/9 (3.2% stroke risk)  Anticoagulation: Continue Apixaban (Eliquis) 5 mg every 12 hours.  Chronic diastolic heart failure: New York Heart Association Class: I (No symptoms with daily activities)   ACE Inibitor/ARB: Not indicated at this time.  Heart failure counseling: I advised them to try and keep their legs up whenever possible and to limit salt in their diet. Essential Hypertension: Controlled   · ACE Inibitor/ARB: Not indicated at this time.  Dual Chamber Pacemaker:  Battery life is at 50%   Indication for Device Placement: Sick Sinus Syndrome   Interrogation Findings: We will plan to recheck their device at their next scheduled appointment date. · Syncope/near syncope of unknown etiology: Sounds to be vasovagal but no clear etiology.  He stopped his Flomax and Valsartan  · Pharmacologic Therapy: Not indicated at this time. · Nonpharmacologic counseling: Because of his condition, I reminded him to try and keep himself well-hydrated and to take extra time when moving from laying to sitting, sitting to standing and standing to walking. I also explained to him to help improve his symptoms he should include 3 g sodium diet, 1 or 2 L of sports drinks daily, knee-high compressions stockings. · Additional Testing List: None   · We agreed that restarting Flomax is ok at this time but continue to hold his Valsartan at least for now. In the meantime, I encouraged Mr. Oz Lambert to continue to take his other medications. FOLLOW UP:   I told Mr. Oz Lambert to call my office if he had any problems, but otherwise I asked him to Return in about 1 year (around 6/23/2022). However, I would be happy to see him sooner should the need arise. Sincerely,  Arti Telles. Timothy CLARKE, MS, F.A.C.C. [facility] Specialist    [address] [place] [address], [address] [place] [address]  Phone: [phone], Fax: [phone]     I believe that the risk of significant morbidity and mortality related to the patient's current medical conditions are: Intermediate. >60 minutes were spent during prep work, discussion and exam of the patient, and follow up documentation and all of their questions were answered. The documentation recorded by the scribe, accurately and completely reflects the services I personally performed and the decisions made by me. Tony Vincent MD, MS, F.A.C.C.  June 23, 2021

## 2021-07-01 ENCOUNTER — OFFICE VISIT (OUTPATIENT)
Dept: PRIMARY CARE CLINIC | Age: 71
End: 2021-07-01
Payer: MEDICARE

## 2021-07-01 ENCOUNTER — HOSPITAL ENCOUNTER (OUTPATIENT)
Dept: GENERAL RADIOLOGY | Age: 71
Discharge: HOME | End: 2021-07-03
Payer: MEDICARE

## 2021-07-01 ENCOUNTER — HOSPITAL ENCOUNTER (OUTPATIENT)
Age: 71
Discharge: HOME | End: 2021-07-03
Payer: MEDICARE

## 2021-07-01 VITALS
DIASTOLIC BLOOD PRESSURE: 80 MMHG | RESPIRATION RATE: 18 BRPM | HEART RATE: 81 BPM | SYSTOLIC BLOOD PRESSURE: 130 MMHG | WEIGHT: 178.4 LBS | BODY MASS INDEX: 30.62 KG/M2

## 2021-07-01 DIAGNOSIS — I50.32 CHRONIC DIASTOLIC CHF (CONGESTIVE HEART FAILURE), NYHA CLASS 2 (HCC): ICD-10-CM

## 2021-07-01 DIAGNOSIS — I48.0 PAF (PAROXYSMAL ATRIAL FIBRILLATION) (HCC): Primary | ICD-10-CM

## 2021-07-01 DIAGNOSIS — M79.671 FOOT PAIN, RIGHT: ICD-10-CM

## 2021-07-01 PROCEDURE — 99211 OFF/OP EST MAY X REQ PHY/QHP: CPT

## 2021-07-01 PROCEDURE — 99213 OFFICE O/P EST LOW 20 MIN: CPT | Performed by: FAMILY MEDICINE

## 2021-07-01 PROCEDURE — 73630 X-RAY EXAM OF FOOT: CPT

## 2021-07-01 PROCEDURE — 1036F TOBACCO NON-USER: CPT | Performed by: FAMILY MEDICINE

## 2021-07-01 PROCEDURE — G8427 DOCREV CUR MEDS BY ELIG CLIN: HCPCS | Performed by: FAMILY MEDICINE

## 2021-07-01 PROCEDURE — 4040F PNEUMOC VAC/ADMIN/RCVD: CPT | Performed by: FAMILY MEDICINE

## 2021-07-01 PROCEDURE — 3017F COLORECTAL CA SCREEN DOC REV: CPT | Performed by: FAMILY MEDICINE

## 2021-07-01 PROCEDURE — 1123F ACP DISCUSS/DSCN MKR DOCD: CPT | Performed by: FAMILY MEDICINE

## 2021-07-01 PROCEDURE — G8417 CALC BMI ABV UP PARAM F/U: HCPCS | Performed by: FAMILY MEDICINE

## 2021-07-01 RX ORDER — VALSARTAN 80 MG/1
40 TABLET ORAL DAILY
COMMUNITY
End: 2021-07-06 | Stop reason: SDUPTHER

## 2021-07-01 SDOH — ECONOMIC STABILITY: FOOD INSECURITY: WITHIN THE PAST 12 MONTHS, THE FOOD YOU BOUGHT JUST DIDN'T LAST AND YOU DIDN'T HAVE MONEY TO GET MORE.: NEVER TRUE

## 2021-07-01 SDOH — ECONOMIC STABILITY: FOOD INSECURITY: WITHIN THE PAST 12 MONTHS, YOU WORRIED THAT YOUR FOOD WOULD RUN OUT BEFORE YOU GOT MONEY TO BUY MORE.: NEVER TRUE

## 2021-07-01 SDOH — ECONOMIC STABILITY: TRANSPORTATION INSECURITY
IN THE PAST 12 MONTHS, HAS LACK OF TRANSPORTATION KEPT YOU FROM MEETINGS, WORK, OR FROM GETTING THINGS NEEDED FOR DAILY LIVING?: NO

## 2021-07-01 SDOH — ECONOMIC STABILITY: TRANSPORTATION INSECURITY
IN THE PAST 12 MONTHS, HAS THE LACK OF TRANSPORTATION KEPT YOU FROM MEDICAL APPOINTMENTS OR FROM GETTING MEDICATIONS?: NO

## 2021-07-01 ASSESSMENT — PATIENT HEALTH QUESTIONNAIRE - PHQ9
SUM OF ALL RESPONSES TO PHQ QUESTIONS 1-9: 0
2. FEELING DOWN, DEPRESSED OR HOPELESS: 0
1. LITTLE INTEREST OR PLEASURE IN DOING THINGS: 0
SUM OF ALL RESPONSES TO PHQ QUESTIONS 1-9: 0
SUM OF ALL RESPONSES TO PHQ QUESTIONS 1-9: 0
SUM OF ALL RESPONSES TO PHQ9 QUESTIONS 1 & 2: 0

## 2021-07-01 ASSESSMENT — SOCIAL DETERMINANTS OF HEALTH (SDOH): HOW HARD IS IT FOR YOU TO PAY FOR THE VERY BASICS LIKE FOOD, HOUSING, MEDICAL CARE, AND HEATING?: NOT HARD AT ALL

## 2021-07-01 NOTE — PROGRESS NOTES
Atrial Fibrillation:   Patient states no chest pain or palpitations. Patient states his pace maker shows he hasn't had any in a long time. He is on eliquis ,no bleeding    Patient would like to discuss his foot pain and arthritis. Had syncope while in [place]- cause not determined    RECHECK B/P  Atrial fibrillation  Currently taking     Allergies:  Amoxicillin-pot clavulanate, Ciprofloxacin, and Amoxicillin    Past Medical History:    Past Medical History:   Diagnosis Date    Asthma     BPH (benign prostatic hyperplasia)     Fracture of L1 vertebra ([address] 10/2010    GERD (gastroesophageal reflux disease)     Gout     High altitude pulmonary edema 2010    Hyperlipidemia     Hypertension     Radiculopathy of leg     left leg    Sleep apnea     Treated C-PAP 10 years    Thyroid disease     Wrist fracture, left 10/2010       Past Surgical History:    Past Surgical History:   Procedure Laterality Date    COLON SURGERY      perirectal abcess    PACEMAKER INSERTION         Social History:   Social History     Tobacco Use    Smoking status: Former Smoker     Packs/day: 0.50     Years: 8.00     Pack years: 4.00     Quit date: 1981     Years since quittin.6    Smokeless tobacco: Former User   Substance Use Topics    Alcohol use:  Yes     Alcohol/week: 1.0 standard drinks     Types: 1 Glasses of wine per week     Comment: occ       Family History:   Family History   Problem Relation Age of Onset   Donnel Edwards Pacemaker Mother     Seizures Mother    Donnel Edwards COPD Father     Hypertension Sister          Review of Systems:  Constitutional: negative for fever or chills  Eyes: negative for visual disturbance   ENT: negative for sore throat or nasal congestion  Respiratory: negative for cough, shortness of breath and sputum  Cardiovascular: negative for chest pain or palpitations  Gastrointestinal: negative for abd pain, nausea, vomiting, diarrhea or constipation  Genitourinary: negative for dysuria, urgency or frequency  Musculoskeletal: Positive for arthralgias, neg for muscle weakness and has stiff joints . Integument/breast: negative for skin rash or lesions  Neurological: negative for unilateral weakness, numbness or tingling. Objective:  Physical Exam:  GEN:   A & O x3, no apparent distress  EYES: No gross abnormalities. PULM: clear to auscultation bilaterally- no wheezes, rales or rhonchi, normal air movement, no respiratory distress  COR: regular rate & rhythm and no gallops   MUSC: has tenderness rt foot distal aspect  EXT: Extremities: + 2 pedal pulses, no edema or calf tenderness, and warm to touch. Normal nails without lesions  NEURO: Motor and sensory grossly intact  SKIN:  No skin lesions or rashes          Assessment:  1. PAF (paroxysmal atrial fibrillation) ([facility] [address]    2. Chronic diastolic CHF (congestive heart failure), NYHA class 2 ([facility] [address]    3. Foot pain, right      Patient Active Problem List   Diagnosis Code    PAF (paroxysmal atrial fibrillation) ([facility]) I48.0    Pericardial effusion with cardiac tamponade I31.3, I31.4    Cardiac pacemaker in situ Z95.0    Numbness and tingling of both legs below knees R20.0, R20.2    Carpal tunnel syndrome of left wrist G56.02    Wrist pain, chronic M25.539, G89.29    Left-sided low back pain with left-sided sciatica M54.42    Chronic foot pain M79.673, G89.29    Acute diverticulitis K57.92    Diverticulitis K57.92    Chronic diastolic CHF (congestive heart failure), NYHA class 2 ([facility]) I50.32    SSS (sick sinus syndrome) ([facility]) I49.5    Diverticulitis large intestine w/o perforation or abscess w/o bleeding K57.32    Near syncope R55    Generalized abdominal pain R10.84    Elevated liver enzymes R74.8    Nausea R11.0       Plan:  No orders of the defined types were placed in this encounter.     Orders Placed This Encounter   Procedures    XR FOOT RIGHT (MIN 3 VIEWS)     Standing Status:   Future     Standing Expiration Date:   7/1/2022   ice packs  No

## 2021-07-06 RX ORDER — LEVOTHYROXINE SODIUM 0.12 MG/1
125 TABLET ORAL DAILY
Qty: 90 TABLET | Refills: 2 | Status: SHIPPED | OUTPATIENT
Start: 2021-07-06 | End: 2021-11-16 | Stop reason: SDUPTHER

## 2021-07-06 RX ORDER — VALSARTAN 80 MG/1
40 TABLET ORAL DAILY
Qty: 90 TABLET | Refills: 1 | Status: SHIPPED | OUTPATIENT
Start: 2021-07-06 | End: 2022-06-10 | Stop reason: SDUPTHER

## 2021-07-06 RX ORDER — ALLOPURINOL 300 MG/1
300 TABLET ORAL DAILY
Qty: 90 TABLET | Refills: 2 | Status: SHIPPED | OUTPATIENT
Start: 2021-07-06 | End: 2022-07-07

## 2021-07-08 ENCOUNTER — HOSPITAL ENCOUNTER (OUTPATIENT)
Dept: NUCLEAR MEDICINE | Age: 71
Discharge: HOME | End: 2021-07-10
Payer: MEDICARE

## 2021-07-08 DIAGNOSIS — M79.671 FOOT PAIN, RIGHT: ICD-10-CM

## 2021-07-08 PROCEDURE — A9503 TC99M MEDRONATE: HCPCS | Performed by: FAMILY MEDICINE

## 2021-07-08 PROCEDURE — G1010 CDSM STANSON: HCPCS

## 2021-07-08 PROCEDURE — 3430000000 HC RX DIAGNOSTIC RADIOPHARMACEUTICAL: Performed by: FAMILY MEDICINE

## 2021-07-08 RX ORDER — TC 99M MEDRONATE 20 MG/10ML
25 INJECTION, POWDER, LYOPHILIZED, FOR SOLUTION INTRAVENOUS
Status: COMPLETED | OUTPATIENT
Start: 2021-07-08 | End: 2021-07-08

## 2021-07-08 RX ADMIN — TC 99M MEDRONATE 25 MILLICURIE: 20 INJECTION, POWDER, LYOPHILIZED, FOR SOLUTION INTRAVENOUS at 09:36

## 2021-07-09 ENCOUNTER — HOSPITAL ENCOUNTER (OUTPATIENT)
Age: 71
Discharge: HOME | End: 2021-07-09
Payer: MEDICARE

## 2021-07-09 DIAGNOSIS — M79.10 MYALGIA: ICD-10-CM

## 2021-07-09 DIAGNOSIS — M79.10 MYALGIA: Primary | ICD-10-CM

## 2021-07-09 LAB
MYOGLOBIN: 137 NG/ML (ref 28–72)
TOTAL CK: 383 U/L (ref 39–308)

## 2021-07-09 PROCEDURE — 83874 ASSAY OF MYOGLOBIN: CPT

## 2021-07-09 PROCEDURE — 36415 COLL VENOUS BLD VENIPUNCTURE: CPT

## 2021-07-09 PROCEDURE — 82550 ASSAY OF CK (CPK): CPT

## 2021-07-11 DIAGNOSIS — R74.8 ELEVATED CK: Primary | ICD-10-CM

## 2021-07-11 DIAGNOSIS — R89.7 ELEVATED MYOGLOBIN LEVEL: ICD-10-CM

## 2021-08-03 DIAGNOSIS — M72.2 PLANTAR FASCIA SYNDROME: ICD-10-CM

## 2021-08-05 ENCOUNTER — HOSPITAL ENCOUNTER (OUTPATIENT)
Dept: PHYSICAL THERAPY | Age: 71
Setting detail: THERAPIES SERIES
Discharge: HOME | End: 2021-08-05
Payer: MEDICARE

## 2021-08-05 PROCEDURE — L3020 FOOT LONGITUD/METATARSAL SUP: HCPCS

## 2021-08-05 NOTE — PLAN OF CARE
Phone: [phone]        Fax: [phone]  [facility]  Physical Therapy  Orthotic Fitting Plan of Care  Date: 2021    Patient Name: Fadia Leong          : 1950  (89 y.o.)  [facility] #: [id number]    Attending Physician: Marie Goodman DPM    Diagnosis: Plantar fascia syndrome, M72.2    Reason for Referral:  Pt with reports of gradual increase of right foot pain. States pain increases with activity. Pt normally walks for ex and has been limited due to pain. Pain is felt mostly on outside of right foot. Objective Assessment:  Pt presents with moderate to severe pes planus bilateral in standing with right LE worse than left. Min tenderness right forefoot. Rear foot valgus bilateral. Pt will benefit from orthotics to increase support. Treatment:  [x]  Fitting for custom orthotics  [x]  Gait and foot analysis  []   Other:     Instructed Patient:   [x]  Proper fitting and follow up visit. [] Other:        Treatment Goals:  []  Met []  Not Met 2021   [x]  Patient will be fitted for custom orthotics to be issued at a later date. Treatment Plan:   [x]  Assessment for and fitting of custom orthotics. Rehab Potential: []  Poor   []  Fair  [x]  Good   []  Excellent     If there are any questions regarding plan of care, please do not hesitate to contact the center.    Thank you for your referral.      Therapists Signature: Jeaneth Santizo PT, DPT, CMPT    Date: 2021        To be completed by the referring physicians   [] Approve the treatment plan as indicated   []  Comments:   [id number] Signature:                        Date: 2021

## 2021-08-08 RX ORDER — METRONIDAZOLE 500 MG/1
500 TABLET ORAL 3 TIMES DAILY
Qty: 30 TABLET | Refills: 0 | Status: SHIPPED | OUTPATIENT
Start: 2021-08-08 | End: 2021-08-18

## 2021-08-08 RX ORDER — SULFAMETHOXAZOLE AND TRIMETHOPRIM 800; 160 MG/1; MG/1
1 TABLET ORAL 2 TIMES DAILY
Qty: 20 TABLET | Refills: 0 | Status: SHIPPED | OUTPATIENT
Start: 2021-08-08 | End: 2021-08-18

## 2021-08-09 ENCOUNTER — OFFICE VISIT (OUTPATIENT)
Dept: PRIMARY CARE CLINIC | Age: 71
End: 2021-08-09
Payer: MEDICARE

## 2021-08-09 ENCOUNTER — HOSPITAL ENCOUNTER (OUTPATIENT)
Age: 71
Discharge: HOME | End: 2021-08-09
Payer: MEDICARE

## 2021-08-09 VITALS — RESPIRATION RATE: 16 BRPM | DIASTOLIC BLOOD PRESSURE: 84 MMHG | HEART RATE: 76 BPM | SYSTOLIC BLOOD PRESSURE: 132 MMHG

## 2021-08-09 DIAGNOSIS — R10.31 RIGHT LOWER QUADRANT ABDOMINAL PAIN: ICD-10-CM

## 2021-08-09 DIAGNOSIS — R10.31 RIGHT LOWER QUADRANT ABDOMINAL PAIN: Primary | ICD-10-CM

## 2021-08-09 LAB
ABSOLUTE EOS #: 0.07 K/UL (ref 0–0.44)
ABSOLUTE IMMATURE GRANULOCYTE: <0.03 K/UL (ref 0–0.3)
ABSOLUTE LYMPH #: 2.04 K/UL (ref 1.1–3.7)
ABSOLUTE MONO #: 0.35 K/UL (ref 0.1–1.2)
ALBUMIN SERPL-MCNC: 4.7 G/DL (ref 3.5–5.2)
ALBUMIN/GLOBULIN RATIO: 2 (ref 1–2.5)
ALP BLD-CCNC: 72 U/L (ref 40–129)
ALT SERPL-CCNC: 19 U/L (ref 5–41)
ANION GAP SERPL CALCULATED.3IONS-SCNC: 11 MMOL/L (ref 9–17)
AST SERPL-CCNC: 22 U/L
BASOPHILS # BLD: 0 % (ref 0–2)
BASOPHILS ABSOLUTE: <0.03 K/UL (ref 0–0.2)
BILIRUB SERPL-MCNC: 0.88 MG/DL (ref 0.3–1.2)
BUN BLDV-MCNC: 17 MG/DL (ref 8–23)
BUN/CREAT BLD: 15 (ref 9–20)
CALCIUM SERPL-MCNC: 10 MG/DL (ref 8.6–10.4)
CHLORIDE BLD-SCNC: 100 MMOL/L (ref 98–107)
CO2: 25 MMOL/L (ref 20–31)
CREAT SERPL-MCNC: 1.11 MG/DL (ref 0.7–1.2)
DIFFERENTIAL TYPE: ABNORMAL
EOSINOPHILS RELATIVE PERCENT: 2 % (ref 1–4)
GFR AFRICAN AMERICAN: >60 ML/MIN
GFR NON-AFRICAN AMERICAN: >60 ML/MIN
GFR SERPL CREATININE-BSD FRML MDRD: NORMAL ML/MIN/{1.73_M2}
GFR SERPL CREATININE-BSD FRML MDRD: NORMAL ML/MIN/{1.73_M2}
GLUCOSE BLD-MCNC: 93 MG/DL (ref 70–99)
HCT VFR BLD CALC: 42.9 % (ref 40.7–50.3)
HEMOGLOBIN: 15 G/DL (ref 13–17)
IMMATURE GRANULOCYTES: 0 %
LYMPHOCYTES # BLD: 44 % (ref 24–43)
MCH RBC QN AUTO: 30.5 PG (ref 25.2–33.5)
MCHC RBC AUTO-ENTMCNC: 35 G/DL (ref 28.4–34.8)
MCV RBC AUTO: 87.4 FL (ref 82.6–102.9)
MONOCYTES # BLD: 8 % (ref 3–12)
MYOGLOBIN: 77 NG/ML (ref 28–72)
NRBC AUTOMATED: 0 PER 100 WBC
PDW BLD-RTO: 12.3 % (ref 11.8–14.4)
PLATELET # BLD: 150 K/UL (ref 138–453)
PLATELET ESTIMATE: ABNORMAL
PMV BLD AUTO: 9.6 FL (ref 8.1–13.5)
POTASSIUM SERPL-SCNC: 4.2 MMOL/L (ref 3.7–5.3)
RBC # BLD: 4.91 M/UL (ref 4.21–5.77)
RBC # BLD: ABNORMAL 10*6/UL
SEG NEUTROPHILS: 46 % (ref 36–65)
SEGMENTED NEUTROPHILS ABSOLUTE COUNT: 2.18 K/UL (ref 1.5–8.1)
SODIUM BLD-SCNC: 136 MMOL/L (ref 135–144)
TOTAL CK: 165 U/L (ref 39–308)
TOTAL PROTEIN: 7 G/DL (ref 6.4–8.3)
WBC # BLD: 4.7 K/UL (ref 3.5–11.3)
WBC # BLD: ABNORMAL 10*3/UL

## 2021-08-09 PROCEDURE — G8417 CALC BMI ABV UP PARAM F/U: HCPCS | Performed by: FAMILY MEDICINE

## 2021-08-09 PROCEDURE — 4040F PNEUMOC VAC/ADMIN/RCVD: CPT | Performed by: FAMILY MEDICINE

## 2021-08-09 PROCEDURE — 82550 ASSAY OF CK (CPK): CPT

## 2021-08-09 PROCEDURE — 99213 OFFICE O/P EST LOW 20 MIN: CPT | Performed by: FAMILY MEDICINE

## 2021-08-09 PROCEDURE — 99213 OFFICE O/P EST LOW 20 MIN: CPT

## 2021-08-09 PROCEDURE — 80053 COMPREHEN METABOLIC PANEL: CPT

## 2021-08-09 PROCEDURE — 99211 OFF/OP EST MAY X REQ PHY/QHP: CPT

## 2021-08-09 PROCEDURE — 85025 COMPLETE CBC W/AUTO DIFF WBC: CPT

## 2021-08-09 PROCEDURE — 3017F COLORECTAL CA SCREEN DOC REV: CPT | Performed by: FAMILY MEDICINE

## 2021-08-09 PROCEDURE — G8427 DOCREV CUR MEDS BY ELIG CLIN: HCPCS | Performed by: FAMILY MEDICINE

## 2021-08-09 PROCEDURE — 36415 COLL VENOUS BLD VENIPUNCTURE: CPT

## 2021-08-09 PROCEDURE — 1123F ACP DISCUSS/DSCN MKR DOCD: CPT | Performed by: FAMILY MEDICINE

## 2021-08-09 PROCEDURE — 83874 ASSAY OF MYOGLOBIN: CPT

## 2021-08-09 PROCEDURE — G0463 HOSPITAL OUTPT CLINIC VISIT: HCPCS

## 2021-08-09 PROCEDURE — 1036F TOBACCO NON-USER: CPT | Performed by: FAMILY MEDICINE

## 2021-08-09 NOTE — PATIENT INSTRUCTIONS
SURVEY:    You may be receiving a survey from [employer] regarding your visit today. You may get this in the mail, through your MyChart, or in your email. Please complete the survey to enable us to provide the highest quality of care to you and your family. If you cannot score us a very good (5 Stars) on any question, please call the office to discuss how we could of made your experience exceptional.    Thank you!     WIL Burch RN Izella Para, [address] [place] [address], [address]    Phone: [phone]  Fax: [phone]    Office Hours:   Chinmayisreal DelmiDarrelpalma, F: 8-5 Wednesday: 9-11

## 2021-08-09 NOTE — PROGRESS NOTES
Patient is here with complaints of abdominal pain and diarrhea. He said that the diarrhea began on Thursday. He said that he has had Diverticulitis in the past and believes that it is that. Allergies:  Amoxicillin-pot clavulanate, Ciprofloxacin, and Amoxicillin    Past Medical History:    Past Medical History:   Diagnosis Date    Asthma     BPH (benign prostatic hyperplasia)     Fracture of L1 vertebra ([address] 10/2010    GERD (gastroesophageal reflux disease)     Gout     High altitude pulmonary edema 2010    Hyperlipidemia     Hypertension     Radiculopathy of leg     left leg    Sleep apnea     Treated C-PAP 10 years    Thyroid disease     Wrist fracture, left 10/2010       Past Surgical History:    Past Surgical History:   Procedure Laterality Date    COLON SURGERY      perirectal abcess    PACEMAKER INSERTION         Social History:   Social History     Tobacco Use    Smoking status: Former Smoker     Packs/day: 0.50     Years: 8.00     Pack years: 4.00     Quit date: 1981     Years since quittin.7    Smokeless tobacco: Former User   Substance Use Topics    Alcohol use:  Yes     Alcohol/week: 1.0 standard drinks     Types: 1 Glasses of wine per week     Comment: occ       Family History:   Family History   Problem Relation Age of Onset   Connye Sole Pacemaker Mother     Seizures Mother    Connye Sole COPD Father     Hypertension Sister        Review of Systems:  Constitutional: negative for fevers or chills  Eyes: negative for visual disturbance   ENT: negative for sore throat or nasal congestion  Respiratory: negative for cough, shortness of breath and sputum  Cardiovascular: negative for chest pain or palpitations  Gastrointestinal: positive for abd pain, positive for nausea, neg for vomiting,neg for diarrhea,  for constipation, neg for tony, neg for blood in stool  Genitourinary: negative for dysuria, urgency or frequency, hematuria  Musculoskeletal:negative for arthralgias, muscle weakness and

## 2021-08-23 RX ORDER — FINASTERIDE 5 MG/1
5 TABLET, FILM COATED ORAL DAILY
Qty: 90 TABLET | Refills: 3 | Status: SHIPPED | OUTPATIENT
Start: 2021-08-23 | End: 2021-11-16 | Stop reason: SDUPTHER

## 2021-09-14 ENCOUNTER — NURSE ONLY (OUTPATIENT)
Dept: CARDIOLOGY | Age: 71
End: 2021-09-14
Payer: MEDICARE

## 2021-09-14 DIAGNOSIS — I49.5 SSS (SICK SINUS SYNDROME) (HCC): ICD-10-CM

## 2021-09-14 DIAGNOSIS — Z95.0 PACEMAKER: Primary | ICD-10-CM

## 2021-09-14 PROCEDURE — 93294 REM INTERROG EVL PM/LDLS PM: CPT | Performed by: FAMILY MEDICINE

## 2021-09-17 ENCOUNTER — HOSPITAL ENCOUNTER (OUTPATIENT)
Dept: PHYSICAL THERAPY | Age: 71
Setting detail: THERAPIES SERIES
Discharge: HOME | End: 2021-09-17
Payer: MEDICARE

## 2021-09-17 PROCEDURE — 97760 ORTHOTIC MGMT&TRAING 1ST ENC: CPT

## 2021-09-17 NOTE — PROGRESS NOTES
Phone: [phone]        Fax: [phone]                     Outpatient Physical Therapy           Orthotic/Inlay                    Date: 2021             ACCT #: [de-identified]                          Patient: Irasema Hansen  : 1950        [x]   Patient received custom foot orthotics this date with proper fitting noted. Discussed appropriate wearing schedule and care for orthotics with good pt understanding noted.            Ben Mejia, PT, DPT     Date: 2021, 4:42 PM

## 2021-09-21 DIAGNOSIS — H91.93 BILATERAL HEARING LOSS, UNSPECIFIED HEARING LOSS TYPE: Primary | ICD-10-CM

## 2021-09-24 ENCOUNTER — APPOINTMENT (OUTPATIENT)
Dept: PHYSICAL THERAPY | Age: 71
End: 2021-09-24
Payer: MEDICARE

## 2021-11-16 RX ORDER — LEVOTHYROXINE SODIUM 0.12 MG/1
125 TABLET ORAL DAILY
Qty: 90 TABLET | Refills: 3 | Status: SHIPPED | OUTPATIENT
Start: 2021-11-16 | End: 2022-10-27 | Stop reason: SDUPTHER

## 2021-11-16 RX ORDER — FINASTERIDE 5 MG/1
5 TABLET, FILM COATED ORAL DAILY
Qty: 90 TABLET | Refills: 3 | Status: SHIPPED | OUTPATIENT
Start: 2021-11-16 | End: 2022-10-27 | Stop reason: SDUPTHER

## 2021-11-16 RX ORDER — TAMSULOSIN HYDROCHLORIDE 0.4 MG/1
0.4 CAPSULE ORAL DAILY
Qty: 90 CAPSULE | Refills: 3 | Status: SHIPPED | OUTPATIENT
Start: 2021-11-16 | End: 2022-10-27 | Stop reason: SDUPTHER

## 2021-11-17 ENCOUNTER — OFFICE VISIT (OUTPATIENT)
Dept: CARDIOLOGY | Age: 71
End: 2021-11-17
Payer: MEDICARE

## 2021-11-17 VITALS
WEIGHT: 175 LBS | DIASTOLIC BLOOD PRESSURE: 82 MMHG | BODY MASS INDEX: 29.88 KG/M2 | OXYGEN SATURATION: 99 % | HEIGHT: 64 IN | SYSTOLIC BLOOD PRESSURE: 148 MMHG | HEART RATE: 85 BPM | RESPIRATION RATE: 18 BRPM

## 2021-11-17 DIAGNOSIS — I49.5 SSS (SICK SINUS SYNDROME) (HCC): ICD-10-CM

## 2021-11-17 DIAGNOSIS — I50.32 CHRONIC DIASTOLIC CONGESTIVE HEART FAILURE (HCC): ICD-10-CM

## 2021-11-17 DIAGNOSIS — Z95.0 PACEMAKER: ICD-10-CM

## 2021-11-17 DIAGNOSIS — I10 ESSENTIAL HYPERTENSION: ICD-10-CM

## 2021-11-17 DIAGNOSIS — R55 SYNCOPE, UNSPECIFIED SYNCOPE TYPE: ICD-10-CM

## 2021-11-17 DIAGNOSIS — I48.0 PAF (PAROXYSMAL ATRIAL FIBRILLATION) (HCC): Primary | ICD-10-CM

## 2021-11-17 PROCEDURE — 3017F COLORECTAL CA SCREEN DOC REV: CPT | Performed by: FAMILY MEDICINE

## 2021-11-17 PROCEDURE — G8484 FLU IMMUNIZE NO ADMIN: HCPCS | Performed by: FAMILY MEDICINE

## 2021-11-17 PROCEDURE — 99214 OFFICE O/P EST MOD 30 MIN: CPT | Performed by: FAMILY MEDICINE

## 2021-11-17 PROCEDURE — 1123F ACP DISCUSS/DSCN MKR DOCD: CPT | Performed by: FAMILY MEDICINE

## 2021-11-17 PROCEDURE — 1036F TOBACCO NON-USER: CPT | Performed by: FAMILY MEDICINE

## 2021-11-17 PROCEDURE — 99211 OFF/OP EST MAY X REQ PHY/QHP: CPT | Performed by: FAMILY MEDICINE

## 2021-11-17 PROCEDURE — G8417 CALC BMI ABV UP PARAM F/U: HCPCS | Performed by: FAMILY MEDICINE

## 2021-11-17 PROCEDURE — G8427 DOCREV CUR MEDS BY ELIG CLIN: HCPCS | Performed by: FAMILY MEDICINE

## 2021-11-17 PROCEDURE — 4040F PNEUMOC VAC/ADMIN/RCVD: CPT | Performed by: FAMILY MEDICINE

## 2021-11-17 NOTE — PATIENT INSTRUCTIONS
SURVEY:    You may be receiving a survey from [employer] regarding your visit today. Please complete the survey to enable us to provide the highest quality of care to you and your family. If you cannot score us a very good on any question, please call the office to discuss how we could have made your experience a very good one. Thank you.

## 2021-11-17 NOTE — PROGRESS NOTES
Kate Mann am scribing for and in the presence of Stevie Medellin. Timothy CLARKE, MS, F.A.C.C. Patient: Malgorzata Sherman  : 1950  Date of Visit: 2021    REASON FOR VISIT / CONSULTATION: Follow-up (HX: PAF, CHF,HTN, Pacer, SSS, Syncope. Pt states he is doing well. C/o: Lighteaded Denies: CP, Palpitations, SOB)      History of Present Illness:        Dear Dr Susan Gary,      I had the pleasure of seeing Malgorzata Sherman in my office today. Mr. Melvi Castillo is a 70 y.o. male  with a history of a dual chamber [employer] pacemaker for sick sinus syndrome. He also was a smoker many years ago. His echocardiogram on 2017 showed an ejection fraction of 60%. He also has a history of paroxysmal atrial fibrillation which has been fairly rare in the past and has been treated with Eliquis. Normal stress and echo done in 2021 while in [place] He did have syncope in February while in [place] which occurred suddenly after getting up to use the bathroom. He had some pretty severe episodes of lightheadedness and dizziness with recurrent short episodes of loss of consciousness. He was admitted and he had stress test and echo done then and was told both where normal.     Mr. Melvi Castillo is here for follow up today. He reports doing fairly well since his last visit. He has had other passing out episodes since his last visit however he followed with Dr. Farheen Osorio on this issue. Dr. Farheen Osorio suggested starting him on Clonide patch 0.1 mg however he was then keeping an eye on his sytmpomts and blood pressures however he started to have some dizziness and blood pressure issues so Dr. Farheen Osorio said to go on half the patch. This did not help and Dr. Farheen Osorio told him to go back onto the Valsartan and he is still keeping track of blood pressures closely. He also has stopped his Atorvasatin due to muscle ache and pains but says he is open to potentially restarting this or another statin in the future.  He denied any heart palpitations or shortness of breath. He denied any current or recent chest pain, abdominal pain, bleeding problems, problems with his medications or any other concerns at this time. Bleeding Risks: Mr. Cinda Gregory denies any current or recent bleeding problems including a history of a GI bleed, ulcers, recent or upcoming surgeries, blood in his stool or black tarry stools or blood in his urine. PAST MEDICAL HISTORY:        Past Medical History:   Diagnosis Date    Asthma     BPH (benign prostatic hyperplasia)     Fracture of L1 vertebra ([address] 10/2010    GERD (gastroesophageal reflux disease)     Gout     High altitude pulmonary edema 2010    Hyperlipidemia     Hypertension     Radiculopathy of leg     left leg    Sleep apnea     Treated C-PAP 10 years    Thyroid disease     Wrist fracture, left 10/2010           CURRENT ALLERGIES: Amoxicillin-pot clavulanate, Ciprofloxacin, and Amoxicillin REVIEW OF SYSTEMS: 14 systems were reviewed. Pertinent positives and negatives as above, all else negative. Past Surgical History:   Procedure Laterality Date    COLON SURGERY      perirectal abcess    PACEMAKER INSERTION      Social History:  Social History     Tobacco Use    Smoking status: Former Smoker     Packs/day: 0.50     Years: 8.00     Pack years: 4.00     Quit date: 1981     Years since quittin.9    Smokeless tobacco: Former User   Vaping Use    Vaping Use: Never used   Substance Use Topics    Alcohol use:  Yes     Alcohol/week: 1.0 standard drink     Types: 1 Glasses of wine per week     Comment: occ    Drug use: Never        CURRENT MEDICATIONS:        Outpatient Medications Marked as Taking for the 21 encounter (Office Visit) with Tk Kim MD   Medication Sig Dispense Refill    finasteride (PROSCAR) 5 MG tablet Take 1 tablet by mouth daily 90 tablet 3    levothyroxine (SYNTHROID) 125 MCG tablet Take 1 tablet by mouth Daily 90 tablet 3    tamsulosin (FLOMAX) 0.4 MG capsule Take 1 capsule by mouth daily 90 capsule 3    valsartan (DIOVAN) 80 MG tablet Take 0.5 tablets by mouth daily Patient taking half tablet daily. 90 tablet 1    apixaban (ELIQUIS) 5 MG TABS tablet Take 1 tablet by mouth 2 times daily 180 tablet 3    fluticasone-salmeterol (ADVAIR DISKUS) 100-50 MCG/DOSE diskus inhaler Inhale 1 puff into the lungs every 12 hours 60 each 2    albuterol sulfate  (90 BASE) MCG/ACT inhaler Inhale 2 puffs into the lungs every 6 hours as needed for Wheezing         FAMILY HISTORY: family history includes COPD in his father; Hypertension in his sister; Pacemaker in his mother; Seizures in his mother. Physical Examination:     BP (!) 148/82 (Site: Left Upper Arm, Position: Sitting, Cuff Size: Medium Adult)   Pulse 85   Resp 18   Ht 5' 4\" (1.626 m)   Wt 175 lb (79.4 kg)   SpO2 99%   BMI 30.04 kg/m²  Body mass index is 30.04 kg/m². Constitutional: He is oriented to person, place, and time. He appears well-developed and well-nourished. In no acute distress. HEENT: Normocephalic and atraumatic. No JVD present. Carotid bruit is not present. No mass and no thyromegaly present. No lymphadenopathy present. Cardiovascular: Normal rate, regular rhythm, normal heart sounds. Exam reveals no gallop and no friction rubs. A I/VI systolic murmur was heard maximally at the second right apex. Pulmonary/Chest: Effort normal and breath sounds normal. No respiratory distress. He has no wheezes, rhonchi or rales. Abdominal: Soft, non-tender. Bowel sounds and aorta are normal. He exhibits no organomegaly, mass or bruit. Extremities: Trace lower extremity edema. No cyanosis and no clubbing. Pulses are 2+ radial and carotid pulses. 2+ dorsalis pedis and posterior tibial pulses bilaterally. Compression stockings in place. Neurological: He is alert and oriented to person, place, and time. No evidence of gross cranial nerve deficit. Coordination appeared normal.   Skin: Skin is warm and dry. Inibitor/ARB: Continue valsartan (Diovan) 40 mg daily.  Heart failure counseling: I advised them to try and keep their legs up whenever possible and to limit salt in their diet. Essential Hypertension: Borderline Controlled however he is very sensitive to blood pressure changes. He keeps very good track of this and his symptoms. I advised him to keep call me if his blood pressure stays elevated or if he develops any worsening symptoms to call us and we can always make changes over the phone. · ACE Inibitor/ARB: Continue valsartan (Diovan) 40 mg daily. Dual Chamber Pacemaker:  Indication for Device Placement: Sick Sinus Syndrome  Interrogation Findings: I reviewed the results of their most recent home interrogation from 11/2021. · Syncope/near syncope of unknown etiology:  No recurrence since last visit  · Nonpharmacologic counseling: Because of his condition, I reminded him to try and keep himself well-hydrated and to take extra time when moving from laying to sitting, sitting to standing and standing to walking. I also explained to him to help improve his symptoms he should include 3 g sodium diet, 1 or 2 L of sports drinks daily, knee-high compressions stockings. · Continue follow up with Dr. Marquis Sanchez. · We did discuss possible doing a Tilt Table test however in the end this will not change our plan at this time. He was agreeable to this and again if things get worse or anything then we will reconsider doing this. · Statin Therapy: We did discuss his use of statin therapy. He really does not have a stronger indication and I am ok with him continue to hold this. He was going to wait to restart it to recheck his LDL level in a month. If he wanted to tho I would start him on Crestor 5 mg daily due to less side effects. His last LDL was done on 2/20/2021 was 63 mg/dL. In the meantime, I encouraged Mr. Neal Brunner to continue to take his other medications.             FOLLOW UP:   I told  Yahaira Boby to call my office if he had any problems, but otherwise I asked him to Return in about 9 months (around 8/17/2022). However, I would be happy to see him sooner should the need arise. Sincerely,  Kanika Aguirre MD, MS, F.A.C.C. [facility] Specialist    [address] [place] [address], [address] [place] [address]  Phone: [phone], Fax: [phone]     I believe that the risk of significant morbidity and mortality related to the patient's current medical conditions are: Intermediate. >30 minutes were spent during prep work, discussion and exam of the patient, and follow up documentation and all of their questions were answered. The documentation recorded by the scribe, accurately and completely reflects the services I personally performed and the decisions made by me. Michel Leavitt MD, MS, F.A.C.C.  November 17, 2021

## 2022-04-27 DIAGNOSIS — Z12.5 ENCOUNTER FOR PROSTATE CANCER SCREENING: ICD-10-CM

## 2022-04-27 DIAGNOSIS — I10 ESSENTIAL HYPERTENSION: Primary | ICD-10-CM

## 2022-04-27 DIAGNOSIS — E03.9 HYPOTHYROIDISM, UNSPECIFIED TYPE: ICD-10-CM

## 2022-05-03 ENCOUNTER — OFFICE VISIT (OUTPATIENT)
Dept: CARDIOLOGY | Age: 72
End: 2022-05-03
Payer: MEDICARE

## 2022-05-03 ENCOUNTER — HOSPITAL ENCOUNTER (OUTPATIENT)
Age: 72
Discharge: HOME | End: 2022-05-03
Payer: MEDICARE

## 2022-05-03 VITALS
HEIGHT: 64 IN | DIASTOLIC BLOOD PRESSURE: 100 MMHG | HEART RATE: 86 BPM | OXYGEN SATURATION: 98 % | WEIGHT: 176 LBS | SYSTOLIC BLOOD PRESSURE: 159 MMHG | RESPIRATION RATE: 18 BRPM | BODY MASS INDEX: 30.05 KG/M2

## 2022-05-03 DIAGNOSIS — I50.32 CHRONIC DIASTOLIC CHF (CONGESTIVE HEART FAILURE), NYHA CLASS 2 (HCC): ICD-10-CM

## 2022-05-03 DIAGNOSIS — I49.5 SSS (SICK SINUS SYNDROME) (HCC): ICD-10-CM

## 2022-05-03 DIAGNOSIS — E03.9 HYPOTHYROIDISM, UNSPECIFIED TYPE: ICD-10-CM

## 2022-05-03 DIAGNOSIS — Z12.5 ENCOUNTER FOR PROSTATE CANCER SCREENING: ICD-10-CM

## 2022-05-03 DIAGNOSIS — Z95.0 PACEMAKER: ICD-10-CM

## 2022-05-03 DIAGNOSIS — I10 ESSENTIAL HYPERTENSION: ICD-10-CM

## 2022-05-03 DIAGNOSIS — I48.0 PAF (PAROXYSMAL ATRIAL FIBRILLATION) (HCC): Primary | ICD-10-CM

## 2022-05-03 DIAGNOSIS — R42 LIGHTHEADED: ICD-10-CM

## 2022-05-03 DIAGNOSIS — Z79.01 ENCOUNTER FOR CURRENT LONG-TERM USE OF ANTICOAGULANTS: ICD-10-CM

## 2022-05-03 LAB
ABSOLUTE EOS #: 0.1 K/UL (ref 0–0.44)
ABSOLUTE IMMATURE GRANULOCYTE: <0.03 K/UL (ref 0–0.3)
ABSOLUTE LYMPH #: 1.84 K/UL (ref 1.1–3.7)
ABSOLUTE MONO #: 0.4 K/UL (ref 0.1–1.2)
ALBUMIN SERPL-MCNC: 4.7 G/DL (ref 3.5–5.2)
ALBUMIN/GLOBULIN RATIO: 2 (ref 1–2.5)
ALP BLD-CCNC: 67 U/L (ref 40–129)
ALT SERPL-CCNC: 24 U/L (ref 5–41)
ANION GAP SERPL CALCULATED.3IONS-SCNC: 9 MMOL/L (ref 9–17)
AST SERPL-CCNC: 20 U/L
BASOPHILS # BLD: 1 % (ref 0–2)
BASOPHILS ABSOLUTE: 0.03 K/UL (ref 0–0.2)
BILIRUB SERPL-MCNC: 0.69 MG/DL (ref 0.3–1.2)
BUN BLDV-MCNC: 21 MG/DL (ref 8–23)
BUN/CREAT BLD: 23 (ref 9–20)
CALCIUM SERPL-MCNC: 9.6 MG/DL (ref 8.6–10.4)
CHLORIDE BLD-SCNC: 105 MMOL/L (ref 98–107)
CHOLESTEROL/HDL RATIO: 4.6
CHOLESTEROL: 255 MG/DL
CO2: 27 MMOL/L (ref 20–31)
CREAT SERPL-MCNC: 0.91 MG/DL (ref 0.7–1.2)
EOSINOPHILS RELATIVE PERCENT: 2 % (ref 1–4)
GFR AFRICAN AMERICAN: >60 ML/MIN
GFR NON-AFRICAN AMERICAN: >60 ML/MIN
GFR SERPL CREATININE-BSD FRML MDRD: ABNORMAL ML/MIN/{1.73_M2}
GFR SERPL CREATININE-BSD FRML MDRD: ABNORMAL ML/MIN/{1.73_M2}
GLUCOSE BLD-MCNC: 100 MG/DL (ref 70–99)
HCT VFR BLD CALC: 44.1 % (ref 40.7–50.3)
HDLC SERPL-MCNC: 55 MG/DL
HEMOGLOBIN: 14.7 G/DL (ref 13–17)
IMMATURE GRANULOCYTES: 0 %
LDL CHOLESTEROL: 175 MG/DL (ref 0–130)
LYMPHOCYTES # BLD: 42 % (ref 24–43)
MCH RBC QN AUTO: 30.5 PG (ref 25.2–33.5)
MCHC RBC AUTO-ENTMCNC: 33.3 G/DL (ref 28.4–34.8)
MCV RBC AUTO: 91.5 FL (ref 82.6–102.9)
MONOCYTES # BLD: 9 % (ref 3–12)
NRBC AUTOMATED: 0 PER 100 WBC
PDW BLD-RTO: 12.7 % (ref 11.8–14.4)
PLATELET # BLD: 147 K/UL (ref 138–453)
PMV BLD AUTO: 9.8 FL (ref 8.1–13.5)
POTASSIUM SERPL-SCNC: 4.6 MMOL/L (ref 3.7–5.3)
PROSTATE SPECIFIC ANTIGEN: 0.61 NG/ML
RBC # BLD: 4.82 M/UL (ref 4.21–5.77)
SEG NEUTROPHILS: 46 % (ref 36–65)
SEGMENTED NEUTROPHILS ABSOLUTE COUNT: 2.01 K/UL (ref 1.5–8.1)
SODIUM BLD-SCNC: 141 MMOL/L (ref 135–144)
TOTAL PROTEIN: 7 G/DL (ref 6.4–8.3)
TRIGL SERPL-MCNC: 125 MG/DL
TSH SERPL DL<=0.05 MIU/L-ACNC: 1.56 UIU/ML (ref 0.3–5)
WBC # BLD: 4.4 K/UL (ref 3.5–11.3)

## 2022-05-03 PROCEDURE — 99214 OFFICE O/P EST MOD 30 MIN: CPT | Performed by: FAMILY MEDICINE

## 2022-05-03 PROCEDURE — 80053 COMPREHEN METABOLIC PANEL: CPT

## 2022-05-03 PROCEDURE — G8427 DOCREV CUR MEDS BY ELIG CLIN: HCPCS | Performed by: FAMILY MEDICINE

## 2022-05-03 PROCEDURE — 99211 OFF/OP EST MAY X REQ PHY/QHP: CPT | Performed by: FAMILY MEDICINE

## 2022-05-03 PROCEDURE — 3017F COLORECTAL CA SCREEN DOC REV: CPT | Performed by: FAMILY MEDICINE

## 2022-05-03 PROCEDURE — G8417 CALC BMI ABV UP PARAM F/U: HCPCS | Performed by: FAMILY MEDICINE

## 2022-05-03 PROCEDURE — 80061 LIPID PANEL: CPT

## 2022-05-03 PROCEDURE — 4040F PNEUMOC VAC/ADMIN/RCVD: CPT | Performed by: FAMILY MEDICINE

## 2022-05-03 PROCEDURE — 1036F TOBACCO NON-USER: CPT | Performed by: FAMILY MEDICINE

## 2022-05-03 PROCEDURE — G0103 PSA SCREENING: HCPCS

## 2022-05-03 PROCEDURE — 84443 ASSAY THYROID STIM HORMONE: CPT

## 2022-05-03 PROCEDURE — 1123F ACP DISCUSS/DSCN MKR DOCD: CPT | Performed by: FAMILY MEDICINE

## 2022-05-03 PROCEDURE — 85025 COMPLETE CBC W/AUTO DIFF WBC: CPT

## 2022-05-03 PROCEDURE — 36415 COLL VENOUS BLD VENIPUNCTURE: CPT

## 2022-05-03 RX ORDER — VALSARTAN 80 MG/1
80 TABLET ORAL DAILY
Qty: 90 TABLET | Refills: 3 | Status: CANCELLED | OUTPATIENT
Start: 2022-05-03

## 2022-05-03 NOTE — PATIENT INSTRUCTIONS
SURVEY:    You may be receiving a survey from [employer] regarding your visit today. Please complete the survey to enable us to provide the highest quality of care to you and your family. If you cannot score us a very good on any question, please call the office to discuss how we could have made your experience a very good one. Thank you.

## 2022-05-05 RX ORDER — ATORVASTATIN CALCIUM 10 MG/1
10 TABLET, FILM COATED ORAL DAILY
Qty: 90 TABLET | Refills: 3 | Status: SHIPPED | OUTPATIENT
Start: 2022-05-05

## 2022-06-10 RX ORDER — ATOVAQUONE AND PROGUANIL HYDROCHLORIDE 250; 100 MG/1; MG/1
1 TABLET, FILM COATED ORAL DAILY
Qty: 30 TABLET | Refills: 0 | Status: SHIPPED | OUTPATIENT
Start: 2022-06-10 | End: 2022-10-26

## 2022-06-10 RX ORDER — VALSARTAN 80 MG/1
40 TABLET ORAL DAILY
Qty: 90 TABLET | Refills: 1 | Status: SHIPPED | OUTPATIENT
Start: 2022-06-10 | End: 2022-09-09 | Stop reason: SDUPTHER

## 2022-06-10 RX ORDER — ALBUTEROL SULFATE 90 UG/1
2 AEROSOL, METERED RESPIRATORY (INHALATION) EVERY 6 HOURS PRN
Qty: 18 G | Refills: 2 | Status: SHIPPED | OUTPATIENT
Start: 2022-06-10 | End: 2022-10-27 | Stop reason: SDUPTHER

## 2022-06-12 RX ORDER — IPRATROPIUM BROMIDE 21 UG/1
2 SPRAY, METERED NASAL EVERY 12 HOURS
Qty: 30 ML | Refills: 3 | Status: SHIPPED | OUTPATIENT
Start: 2022-06-12 | End: 2022-10-27 | Stop reason: SDUPTHER

## 2022-06-13 RX ORDER — DOXYCYCLINE HYCLATE 100 MG
100 TABLET ORAL 2 TIMES DAILY
Qty: 20 TABLET | Refills: 0 | Status: SHIPPED | OUTPATIENT
Start: 2022-06-13 | End: 2022-06-23

## 2022-07-07 RX ORDER — ALLOPURINOL 300 MG/1
TABLET ORAL
Qty: 90 TABLET | Refills: 2 | Status: SHIPPED | OUTPATIENT
Start: 2022-07-07 | End: 2022-10-27 | Stop reason: SDUPTHER

## 2022-07-07 RX ORDER — APIXABAN 5 MG/1
TABLET, FILM COATED ORAL
Qty: 180 TABLET | Refills: 3 | Status: SHIPPED | OUTPATIENT
Start: 2022-07-07

## 2022-07-07 NOTE — TELEPHONE ENCOUNTER
Last OV: 8/9/2021  Last RX:    Next scheduled apt: Visit date not found       Rx refill requested through [facility] for:  Allopurinol 300mg #90 R-2    Rx pending.

## 2022-09-09 RX ORDER — VALSARTAN 80 MG/1
40 TABLET ORAL DAILY
Qty: 90 TABLET | Refills: 1 | Status: SHIPPED | OUTPATIENT
Start: 2022-09-09 | End: 2022-09-12

## 2022-09-12 RX ORDER — VALSARTAN 40 MG/1
40 TABLET ORAL DAILY
Qty: 90 TABLET | Refills: 3 | Status: SHIPPED | OUTPATIENT
Start: 2022-09-12

## 2022-10-17 DIAGNOSIS — I50.32 CHRONIC DIASTOLIC CHF (CONGESTIVE HEART FAILURE), NYHA CLASS 2 (HCC): Primary | ICD-10-CM

## 2022-10-17 DIAGNOSIS — Z00.00 ROUTINE GENERAL MEDICAL EXAMINATION AT HEALTH CARE FACILITY: ICD-10-CM

## 2022-10-20 DIAGNOSIS — I10 ESSENTIAL HYPERTENSION: Primary | ICD-10-CM

## 2022-10-20 DIAGNOSIS — R73.9 HYPERGLYCEMIA: ICD-10-CM

## 2022-10-21 ENCOUNTER — HOSPITAL ENCOUNTER (OUTPATIENT)
Age: 72
Discharge: HOME | End: 2022-10-21
Payer: MEDICARE

## 2022-10-21 DIAGNOSIS — R73.9 HYPERGLYCEMIA: ICD-10-CM

## 2022-10-21 DIAGNOSIS — I50.32 CHRONIC DIASTOLIC CHF (CONGESTIVE HEART FAILURE), NYHA CLASS 2 (HCC): ICD-10-CM

## 2022-10-21 DIAGNOSIS — Z00.00 ROUTINE GENERAL MEDICAL EXAMINATION AT HEALTH CARE FACILITY: ICD-10-CM

## 2022-10-21 DIAGNOSIS — I10 ESSENTIAL HYPERTENSION: ICD-10-CM

## 2022-10-21 LAB
ABSOLUTE EOS #: 0.1 K/UL (ref 0–0.44)
ABSOLUTE IMMATURE GRANULOCYTE: <0.03 K/UL (ref 0–0.3)
ABSOLUTE LYMPH #: 1.91 K/UL (ref 1.1–3.7)
ABSOLUTE MONO #: 0.36 K/UL (ref 0.1–1.2)
ALBUMIN SERPL-MCNC: 4.6 G/DL (ref 3.5–5.2)
ALBUMIN/GLOBULIN RATIO: 1.9 (ref 1–2.5)
ALP BLD-CCNC: 73 U/L (ref 40–129)
ALT SERPL-CCNC: 21 U/L (ref 5–41)
ANION GAP SERPL CALCULATED.3IONS-SCNC: 10 MMOL/L (ref 9–17)
AST SERPL-CCNC: 20 U/L
BASOPHILS # BLD: 0 % (ref 0–2)
BASOPHILS ABSOLUTE: <0.03 K/UL (ref 0–0.2)
BILIRUB SERPL-MCNC: 1.2 MG/DL (ref 0.3–1.2)
BUN BLDV-MCNC: 18 MG/DL (ref 8–23)
BUN/CREAT BLD: 17 (ref 9–20)
CALCIUM SERPL-MCNC: 9.4 MG/DL (ref 8.6–10.4)
CHLORIDE BLD-SCNC: 102 MMOL/L (ref 98–107)
CHOLESTEROL/HDL RATIO: 2.8
CHOLESTEROL: 151 MG/DL
CO2: 27 MMOL/L (ref 20–31)
CREAT SERPL-MCNC: 1.06 MG/DL (ref 0.7–1.2)
EOSINOPHILS RELATIVE PERCENT: 2 % (ref 1–4)
GFR SERPL CREATININE-BSD FRML MDRD: >60 ML/MIN/1.73M2
GLUCOSE BLD-MCNC: 103 MG/DL (ref 70–99)
HCT VFR BLD CALC: 43.4 % (ref 40.7–50.3)
HDLC SERPL-MCNC: 53 MG/DL
HEMOGLOBIN: 14.7 G/DL (ref 13–17)
IMMATURE GRANULOCYTES: 0 %
LDL CHOLESTEROL: 73 MG/DL (ref 0–130)
LYMPHOCYTES # BLD: 42 % (ref 24–43)
MCH RBC QN AUTO: 31.1 PG (ref 25.2–33.5)
MCHC RBC AUTO-ENTMCNC: 33.9 G/DL (ref 28.4–34.8)
MCV RBC AUTO: 91.8 FL (ref 82.6–102.9)
MONOCYTES # BLD: 8 % (ref 3–12)
NRBC AUTOMATED: 0 PER 100 WBC
PDW BLD-RTO: 13 % (ref 11.8–14.4)
PLATELET # BLD: 160 K/UL (ref 138–453)
PMV BLD AUTO: 9.4 FL (ref 8.1–13.5)
POTASSIUM SERPL-SCNC: 4.3 MMOL/L (ref 3.7–5.3)
RBC # BLD: 4.73 M/UL (ref 4.21–5.77)
SEG NEUTROPHILS: 48 % (ref 36–65)
SEGMENTED NEUTROPHILS ABSOLUTE COUNT: 2.17 K/UL (ref 1.5–8.1)
SODIUM BLD-SCNC: 139 MMOL/L (ref 135–144)
TOTAL PROTEIN: 7 G/DL (ref 6.4–8.3)
TRIGL SERPL-MCNC: 125 MG/DL
TSH SERPL DL<=0.05 MIU/L-ACNC: 0.92 UIU/ML (ref 0.3–5)
URIC ACID: 4.1 MG/DL (ref 3.4–7)
WBC # BLD: 4.6 K/UL (ref 3.5–11.3)

## 2022-10-21 PROCEDURE — 83036 HEMOGLOBIN GLYCOSYLATED A1C: CPT

## 2022-10-21 PROCEDURE — 80053 COMPREHEN METABOLIC PANEL: CPT

## 2022-10-21 PROCEDURE — 80061 LIPID PANEL: CPT

## 2022-10-21 PROCEDURE — 84550 ASSAY OF BLOOD/URIC ACID: CPT

## 2022-10-21 PROCEDURE — 36415 COLL VENOUS BLD VENIPUNCTURE: CPT

## 2022-10-21 PROCEDURE — 85025 COMPLETE CBC W/AUTO DIFF WBC: CPT

## 2022-10-21 PROCEDURE — 84443 ASSAY THYROID STIM HORMONE: CPT

## 2022-10-22 LAB
ESTIMATED AVERAGE GLUCOSE: 91 MG/DL
HBA1C MFR BLD: 4.8 % (ref 4–6)

## 2022-10-26 ENCOUNTER — OFFICE VISIT (OUTPATIENT)
Dept: CARDIOLOGY | Age: 72
End: 2022-10-26
Payer: MEDICARE

## 2022-10-26 VITALS
HEART RATE: 74 BPM | HEIGHT: 65 IN | SYSTOLIC BLOOD PRESSURE: 143 MMHG | DIASTOLIC BLOOD PRESSURE: 84 MMHG | RESPIRATION RATE: 16 BRPM | BODY MASS INDEX: 29.46 KG/M2 | WEIGHT: 176.8 LBS | OXYGEN SATURATION: 98 %

## 2022-10-26 DIAGNOSIS — Z95.0 CARDIAC PACEMAKER IN SITU: ICD-10-CM

## 2022-10-26 DIAGNOSIS — R42 LIGHTHEADED: ICD-10-CM

## 2022-10-26 DIAGNOSIS — I48.0 PAF (PAROXYSMAL ATRIAL FIBRILLATION) (HCC): Primary | ICD-10-CM

## 2022-10-26 DIAGNOSIS — E78.2 MIXED HYPERLIPIDEMIA: ICD-10-CM

## 2022-10-26 DIAGNOSIS — I50.32 CHRONIC DIASTOLIC CONGESTIVE HEART FAILURE (HCC): ICD-10-CM

## 2022-10-26 DIAGNOSIS — I10 PRIMARY HYPERTENSION: ICD-10-CM

## 2022-10-26 PROCEDURE — 3017F COLORECTAL CA SCREEN DOC REV: CPT | Performed by: FAMILY MEDICINE

## 2022-10-26 PROCEDURE — G8417 CALC BMI ABV UP PARAM F/U: HCPCS | Performed by: FAMILY MEDICINE

## 2022-10-26 PROCEDURE — 99214 OFFICE O/P EST MOD 30 MIN: CPT | Performed by: FAMILY MEDICINE

## 2022-10-26 PROCEDURE — 1123F ACP DISCUSS/DSCN MKR DOCD: CPT | Performed by: FAMILY MEDICINE

## 2022-10-26 PROCEDURE — G8484 FLU IMMUNIZE NO ADMIN: HCPCS | Performed by: FAMILY MEDICINE

## 2022-10-26 PROCEDURE — 3074F SYST BP LT 130 MM HG: CPT | Performed by: FAMILY MEDICINE

## 2022-10-26 PROCEDURE — 93010 ELECTROCARDIOGRAM REPORT: CPT | Performed by: FAMILY MEDICINE

## 2022-10-26 PROCEDURE — 1036F TOBACCO NON-USER: CPT | Performed by: FAMILY MEDICINE

## 2022-10-26 PROCEDURE — 3078F DIAST BP <80 MM HG: CPT | Performed by: FAMILY MEDICINE

## 2022-10-26 PROCEDURE — G8427 DOCREV CUR MEDS BY ELIG CLIN: HCPCS | Performed by: FAMILY MEDICINE

## 2022-10-26 PROCEDURE — 93005 ELECTROCARDIOGRAM TRACING: CPT | Performed by: FAMILY MEDICINE

## 2022-10-26 NOTE — PROGRESS NOTES
Edmond Medellin am scribing for and in the presence of Moriah Aguirre MD, MS, F.A.C.C. Patient: Keri Mark  : 1950  Date of Visit: 2022    REASON FOR VISIT / CONSULTATION: Atrial Fibrillation (Hx:PAF,CHF,HTN,Pacer,SSS,HLD,Dizzy. He has been doing very good. Denies: CP, SOB, Lightheaded/dizziness(rarely), Palpitations. )      History of Present Illness:        Dear Dr Savanna Hart,      I had the pleasure of seeing Keri Mark in my office today. Mr. Mariluz German is a 67 y.o. male  with a history of a dual chamber Biotronik pacemaker for sick sinus syndrome. He also was a smoker many years ago. His echocardiogram on 2017 showed an ejection fraction of 60%. He also has a history of paroxysmal atrial fibrillation which has been fairly rare in the past and has been treated with Eliquis. Normal stress and echo done in 2021 while [place] He did have syncope in February while in [place] which occurred suddenly after getting up to use the bathroom. He had some pretty severe episodes of lightheadedness and dizziness with recurrent short episodes of loss of consciousness. He was admitted and he had stress test and echo done then and was told both where normal.     Mr. Mariluz German is here for a 6 month follow up today. He reports doing very well since his last visit. He denied having any chest pain, pressure or tightness He denies having any shortness of breath, lightheaded/dizziness or palpitations. No abdominal pain, bleeding problems, bowel issues, problems with his medications or any other concerns at this time. No nausea or vomiting. No cough, fever or chills. Exercise Tolerance: Mr. Mariluz German reports that he has an excellent exercise tolerance. His says that he exercises about 5x/week between walking and ecliptically. He says he is working out now more than ever.     Bleeding Risks: Mr. Mariluz German denies any current or recent bleeding problems including a history of a GI bleed, ulcers, recent or upcoming surgeries, blood in his stool or black tarry stools or blood in his urine. PAST MEDICAL HISTORY:        Past Medical History:   Diagnosis Date    Asthma     BPH (benign prostatic hyperplasia)     Fracture of L1 vertebra ([address] 10/2010    GERD (gastroesophageal reflux disease)     Gout     High altitude pulmonary edema 2010    Hyperlipidemia     Hypertension     Radiculopathy of leg     left leg    Sleep apnea     Treated C-PAP 10 years    Thyroid disease     Wrist fracture, left 10/2010           CURRENT ALLERGIES: Amoxicillin-pot clavulanate, Ciprofloxacin, and Amoxicillin REVIEW OF SYSTEMS: 14 systems were reviewed. Pertinent positives and negatives as above, all else negative. Past Surgical History:   Procedure Laterality Date    COLON SURGERY      perirectal abcess    PACEMAKER INSERTION      Social History:  Social History     Tobacco Use    Smoking status: Former     Packs/day: 0.50     Years: 8.00     Pack years: 4.00     Types: Cigarettes     Quit date: 1981     Years since quittin.9    Smokeless tobacco: Former   Vaping Use    Vaping Use: Never used   Substance Use Topics    Alcohol use: Yes     Alcohol/week: 1.0 standard drink     Types: 1 Glasses of wine per week     Comment: occ    Drug use: Never        CURRENT MEDICATIONS:        Outpatient Medications Marked as Taking for the 10/26/22 encounter (Office Visit) with Cirilo Garcia MD   Medication Sig Dispense Refill    valsartan (DIOVAN) 40 MG tablet Take 1 tablet by mouth daily Patient taking half tablet daily.  (Patient taking differently: Take 40 mg by mouth daily) 90 tablet 3    ELIQUIS 5 MG TABS tablet TAKE ONE TABLET BY MOUTH TWICE A  tablet 3    allopurinol (ZYLOPRIM) 300 MG tablet TAKE ONE TABLET BY MOUTH DAILY 90 tablet 2    ipratropium (ATROVENT) 0.03 % nasal spray 2 sprays by Each Nostril route every 12 hours 30 mL 3    albuterol sulfate HFA (PROVENTIL;VENTOLIN;PROAIR) 108 (90 Base) MCG/ACT inhaler Inhale 2 puffs into the lungs every 6 hours as needed for Wheezing 18 g 2    atorvastatin (LIPITOR) 10 MG tablet Take 1 tablet by mouth daily 90 tablet 3    finasteride (PROSCAR) 5 MG tablet Take 1 tablet by mouth daily 90 tablet 3    levothyroxine (SYNTHROID) 125 MCG tablet Take 1 tablet by mouth Daily 90 tablet 3    tamsulosin (FLOMAX) 0.4 MG capsule Take 1 capsule by mouth daily 90 capsule 3       FAMILY HISTORY: family history includes COPD in his father; Hypertension in his sister; Pacemaker in his mother; Seizures in his mother. Physical Examination:     BP (!) 164/96 (Site: Left Upper Arm, Position: Sitting, Cuff Size: Medium Adult)   Pulse 71   Resp 16   Ht 5' 5\" (1.651 m)   Wt 176 lb 12.8 oz (80.2 kg)   SpO2 98%   BMI 29.42 kg/m²  Body mass index is 29.42 kg/m². Constitutional: He is oriented to person, place, and time. He appears well-developed and well-nourished. In no acute distress. HEENT: Normocephalic and atraumatic. No JVD present. Carotid bruit is not present. No mass and no thyromegaly present. No lymphadenopathy present. Cardiovascular: Normal rate, regular rhythm, normal heart sounds. Exam reveals no gallop and no friction rubs. 1/6 systolic murmur, 5th intercostal space on the LEFT in the mid-clavicular line (cardiac apex). Pulmonary/Chest: Effort normal and breath sounds normal. No respiratory distress. He has no wheezes, rhonchi or rales. Abdominal: Soft, non-tender. Bowel sounds and aorta are normal. He exhibits no organomegaly, mass or bruit. Extremities: Trace lower extremity edema. No cyanosis and no clubbing. Pulses are 2+ radial and carotid pulses. 2+ dorsalis pedis and posterior tibial pulses bilaterally. Neurological: He is alert and oriented to person, place, and time. No evidence of gross cranial nerve deficit. Coordination appeared normal.   Skin: Skin is warm and dry. There is no rash or diaphoresis.    Psychiatric: He has a normal mood and affect. His speech is normal and behavior is normal.         MOST RECENT LABS ON RECORD:   Lab Results   Component Value Date    WBC 4.6 10/21/2022    HGB 14.7 10/21/2022    HCT 43.4 10/21/2022     10/21/2022    CHOL 151 10/21/2022    TRIG 125 10/21/2022    HDL 53 10/21/2022    ALT 21 10/21/2022    AST 20 10/21/2022     10/21/2022    K 4.3 10/21/2022     10/21/2022    CREATININE 1.06 10/21/2022    BUN 18 10/21/2022    CO2 27 10/21/2022    TSH 0.92 10/21/2022    PSA 0.61 05/03/2022    LABA1C 4.8 10/21/2022       ASSESSMENT:     1. PAF (paroxysmal atrial fibrillation) ([facility]    2. Chronic diastolic congestive heart failure ([facility]    3. Primary hypertension    4. Cardiac pacemaker in situ    5. Mixed hyperlipidemia    6. Lightheaded      PLAN:          Paroxysmal Atrial Fibrillation: Rate Control, no recurrent episodes of atrial fibrillation   Beta Blocker: Not able to take due to a history of fatigue as well as orthostatic hypotension. [id number] Score for Atrial Fibrillation Stroke Risk    Risk   Factors  Component Value   C CHF Yes 1   H HTN Yes 1   A2 Age >= 76 No,  (73 y.o.) 0   D DM No 0   S2 Prior Stroke/TIA No 0   V Vascular Disease No 0   A Age 74-69 Yes,  (73 y.o.) 1   Sc Sex male 0    [id number]  Score  3   Score last updated 1/20/13 71:05 AM EDT  Click here for a link to the UpToDate guideline \"Atrial Fibrillation: Anticoagulation therapy to prevent embolization  Disclaimer: Risk Score calculation is dependent on accuracy of patient problem list and past encounter diagnosis. Stroke Risk: His CHADS2-VASc score is 3/9 (3.2% stroke risk)  Anticoagulation: Continue Apixaban (Eliquis) 5 mg every 12 hours.    Because of his atrial fibrillation, I also would also recommend that he continue with anticoagulation to decrease his risk of stoke but also reminded him to watch for signs of blood in his stool or black tarry stools and stop the medication immediately if this develops as this could be life threatening. Chronic diastolic heart failure: New York Heart Association Class: IIa  Beta Blocker: Contraindicated due to history of symptomatic bradycardia, intolerance and/or allergy. ACE Inibitor/ARB: Continue valsartan (Diovan) 40 mg daily. Heart failure counseling: Not indicated. Essential Hypertension: Borderline Controlled today but I reviewed his home measurements which were all between 706 and 207 systolic. Last Echo from 2017 showed no LVH at this time. ACE Inibitor/ARB: Continue valsartan (Diovan) 40 mg daily. Dual Chamber Pacemaker: I revewed his last pacemaker check with him including 40% battery life remaining and 75% atrial pacing. Indication for Device Placement: Sick Sinus Syndrome  Interrogation Findings: I reviewed the results of their most recent home interrogation from 9/2022. Hyperlipidemia: Last LDL on 10/21/2022 was 73 mg/dL  Cholesterol Reduction Therapy: Continue Atorvastatin (Lipitor) 10 mg daily I discussed the potential benefits of statin therapy as well as the potential risks including myalgia as well as the rare but potentially serious complication of liver or kidney damage. Although rare, I told them that this could be serious and therefore told them to stop the medication immediately and call if they developed any severe muscle aches or pains and they agreed to do so. Lightheadedness/dizziness: Mild at this time. Nonpharmacologic counseling: Because of his condition, I reminded him to try and keep himself well-hydrated and to take extra time when moving from laying to sitting, sitting to standing and standing to walking. I also explained to him to help improve his symptoms he should include 3 g sodium diet, 1 or 2 L of sports drinks daily, knee-high compressions stockings. Obesity: Body mass index is 29.42 kg/m².    I also briefly discussed both diet and exercise strategies for him to continue to loses weight and he was very receptive to this. I encouraged him to try and lose 10 lbs over the next 6 months. In the meantime, I encouraged Mr. Louise Roberts to continue to take his other medications. FOLLOW UP:   I told Mr. Louise Roberts to call my office if he had any problems, but otherwise I asked him to Return in about 6 months (around 4/26/2023). However, I would be happy to see him sooner should the need arise. Sincerely,  Mariah Maciel. Timothy CLARKE, MS, F.A.C.C. [facility]    [address] [place] [address]  Phone: [phone], Fax: [phone]     I believe that the risk of significant morbidity and mortality related to the patient's current medical conditions are: Intermediate. The documentation recorded by the scribe, accurately and completely reflects the services I personally performed and the decisions made by me. Julianne Gonzalez MD, MS, F.A.C.C.  October 26, 2022

## 2022-10-26 NOTE — PATIENT INSTRUCTIONS
SURVEY:    You may be receiving a survey from [employer] regarding your visit today. Please complete the survey to enable us to provide the highest quality of care to you and your family. If you cannot score us a very good on any question, please call the office to discuss how we could have made your experience a very good one. Thank you.

## 2022-10-27 ENCOUNTER — OFFICE VISIT (OUTPATIENT)
Dept: PRIMARY CARE CLINIC | Age: 72
End: 2022-10-27
Payer: MEDICARE

## 2022-10-27 VITALS
DIASTOLIC BLOOD PRESSURE: 73 MMHG | BODY MASS INDEX: 29.39 KG/M2 | SYSTOLIC BLOOD PRESSURE: 124 MMHG | RESPIRATION RATE: 16 BRPM | HEART RATE: 90 BPM | WEIGHT: 176.6 LBS

## 2022-10-27 DIAGNOSIS — I10 ESSENTIAL HYPERTENSION: Primary | ICD-10-CM

## 2022-10-27 DIAGNOSIS — I50.32 CHRONIC DIASTOLIC CHF (CONGESTIVE HEART FAILURE), NYHA CLASS 2 (HCC): ICD-10-CM

## 2022-10-27 DIAGNOSIS — R73.9 HYPERGLYCEMIA: ICD-10-CM

## 2022-10-27 PROCEDURE — G8427 DOCREV CUR MEDS BY ELIG CLIN: HCPCS | Performed by: FAMILY MEDICINE

## 2022-10-27 PROCEDURE — 99213 OFFICE O/P EST LOW 20 MIN: CPT | Performed by: FAMILY MEDICINE

## 2022-10-27 PROCEDURE — 3017F COLORECTAL CA SCREEN DOC REV: CPT | Performed by: FAMILY MEDICINE

## 2022-10-27 PROCEDURE — 3074F SYST BP LT 130 MM HG: CPT | Performed by: FAMILY MEDICINE

## 2022-10-27 PROCEDURE — G8417 CALC BMI ABV UP PARAM F/U: HCPCS | Performed by: FAMILY MEDICINE

## 2022-10-27 PROCEDURE — 99211 OFF/OP EST MAY X REQ PHY/QHP: CPT | Performed by: FAMILY MEDICINE

## 2022-10-27 PROCEDURE — G8484 FLU IMMUNIZE NO ADMIN: HCPCS | Performed by: FAMILY MEDICINE

## 2022-10-27 PROCEDURE — 1036F TOBACCO NON-USER: CPT | Performed by: FAMILY MEDICINE

## 2022-10-27 PROCEDURE — 3078F DIAST BP <80 MM HG: CPT | Performed by: FAMILY MEDICINE

## 2022-10-27 PROCEDURE — 1123F ACP DISCUSS/DSCN MKR DOCD: CPT | Performed by: FAMILY MEDICINE

## 2022-10-27 RX ORDER — IPRATROPIUM BROMIDE 21 UG/1
2 SPRAY, METERED NASAL EVERY 12 HOURS
Qty: 30 ML | Refills: 3 | Status: SHIPPED | OUTPATIENT
Start: 2022-10-27

## 2022-10-27 RX ORDER — FINASTERIDE 5 MG/1
5 TABLET, FILM COATED ORAL DAILY
Qty: 90 TABLET | Refills: 3 | Status: SHIPPED | OUTPATIENT
Start: 2022-10-27

## 2022-10-27 RX ORDER — ALLOPURINOL 300 MG/1
300 TABLET ORAL DAILY
Qty: 90 TABLET | Refills: 2 | Status: SHIPPED | OUTPATIENT
Start: 2022-10-27

## 2022-10-27 RX ORDER — LEVOTHYROXINE SODIUM 0.12 MG/1
125 TABLET ORAL DAILY
Qty: 90 TABLET | Refills: 3 | Status: SHIPPED | OUTPATIENT
Start: 2022-10-27

## 2022-10-27 RX ORDER — ALBUTEROL SULFATE 90 UG/1
2 AEROSOL, METERED RESPIRATORY (INHALATION) EVERY 6 HOURS PRN
Qty: 18 G | Refills: 2 | Status: SHIPPED | OUTPATIENT
Start: 2022-10-27

## 2022-10-27 RX ORDER — TAMSULOSIN HYDROCHLORIDE 0.4 MG/1
0.4 CAPSULE ORAL DAILY
Qty: 90 CAPSULE | Refills: 3 | Status: SHIPPED | OUTPATIENT
Start: 2022-10-27

## 2022-10-27 SDOH — ECONOMIC STABILITY: FOOD INSECURITY: WITHIN THE PAST 12 MONTHS, YOU WORRIED THAT YOUR FOOD WOULD RUN OUT BEFORE YOU GOT MONEY TO BUY MORE.: NEVER TRUE

## 2022-10-27 SDOH — ECONOMIC STABILITY: FOOD INSECURITY: WITHIN THE PAST 12 MONTHS, THE FOOD YOU BOUGHT JUST DIDN'T LAST AND YOU DIDN'T HAVE MONEY TO GET MORE.: NEVER TRUE

## 2022-10-27 ASSESSMENT — PATIENT HEALTH QUESTIONNAIRE - PHQ9
1. LITTLE INTEREST OR PLEASURE IN DOING THINGS: 0
SUM OF ALL RESPONSES TO PHQ QUESTIONS 1-9: 0
SUM OF ALL RESPONSES TO PHQ QUESTIONS 1-9: 0
2. FEELING DOWN, DEPRESSED OR HOPELESS: 0
SUM OF ALL RESPONSES TO PHQ QUESTIONS 1-9: 0
SUM OF ALL RESPONSES TO PHQ9 QUESTIONS 1 & 2: 0
SUM OF ALL RESPONSES TO PHQ QUESTIONS 1-9: 0

## 2022-10-27 ASSESSMENT — SOCIAL DETERMINANTS OF HEALTH (SDOH): HOW HARD IS IT FOR YOU TO PAY FOR THE VERY BASICS LIKE FOOD, HOUSING, MEDICAL CARE, AND HEATING?: NOT HARD AT ALL

## 2022-10-27 NOTE — PATIENT INSTRUCTIONS
SURVEY:    You may be receiving a survey from [employer] regarding your visit today. You may get this in the mail, through your MyChart, or in your email. Please complete the survey to enable us to provide the highest quality of care to you and your family. If you cannot score us a very good (5 Stars) on any question, please call the office to discuss how we could of made your experience exceptional.    Thank you!     Dr. Luciano Lutz, WIL Nunn, [address] [place] [address], [address] [place] [address]    Phone: [phone]  Fax: [phone]    Office Hours:   Jaleel Richards, [address] [facility], F: 8-5

## 2022-12-12 RX ORDER — TAMSULOSIN HYDROCHLORIDE 0.4 MG/1
0.4 CAPSULE ORAL DAILY
Qty: 90 CAPSULE | Refills: 3 | Status: SHIPPED | OUTPATIENT
Start: 2022-12-12

## 2022-12-12 RX ORDER — FINASTERIDE 5 MG/1
5 TABLET, FILM COATED ORAL DAILY
Qty: 90 TABLET | Refills: 3 | Status: SHIPPED | OUTPATIENT
Start: 2022-12-12

## 2023-01-03 ENCOUNTER — NURSE ONLY (OUTPATIENT)
Dept: CARDIOLOGY | Age: 73
End: 2023-01-03
Payer: MEDICARE

## 2023-01-03 DIAGNOSIS — I49.5 SSS (SICK SINUS SYNDROME) (HCC): ICD-10-CM

## 2023-01-03 DIAGNOSIS — Z95.0 CARDIAC PACEMAKER IN SITU: Primary | ICD-10-CM

## 2023-01-03 PROCEDURE — 93294 REM INTERROG EVL PM/LDLS PM: CPT | Performed by: FAMILY MEDICINE

## 2023-03-11 RX ORDER — DOXYCYCLINE HYCLATE 100 MG
100 TABLET ORAL 2 TIMES DAILY
Qty: 20 TABLET | Refills: 0 | Status: SHIPPED | OUTPATIENT
Start: 2023-03-11 | End: 2023-03-21

## 2023-03-11 RX ORDER — PREDNISONE 20 MG/1
20 TABLET ORAL 2 TIMES DAILY
Qty: 20 TABLET | Refills: 0 | Status: SHIPPED | OUTPATIENT
Start: 2023-03-11 | End: 2023-03-21

## 2023-03-15 ENCOUNTER — OFFICE VISIT (OUTPATIENT)
Dept: CARDIOLOGY | Age: 73
End: 2023-03-15
Payer: MEDICARE

## 2023-03-15 VITALS
OXYGEN SATURATION: 100 % | SYSTOLIC BLOOD PRESSURE: 161 MMHG | DIASTOLIC BLOOD PRESSURE: 101 MMHG | WEIGHT: 174.4 LBS | HEIGHT: 65 IN | RESPIRATION RATE: 18 BRPM | BODY MASS INDEX: 29.06 KG/M2 | HEART RATE: 80 BPM

## 2023-03-15 DIAGNOSIS — J45.21 MILD INTERMITTENT ASTHMA WITH EXACERBATION: ICD-10-CM

## 2023-03-15 DIAGNOSIS — I49.5 SSS (SICK SINUS SYNDROME) (HCC): ICD-10-CM

## 2023-03-15 DIAGNOSIS — Z95.0 CARDIAC PACEMAKER IN SITU: ICD-10-CM

## 2023-03-15 DIAGNOSIS — R05.4 COUGH SYNCOPE: ICD-10-CM

## 2023-03-15 DIAGNOSIS — R55 COUGH SYNCOPE: ICD-10-CM

## 2023-03-15 DIAGNOSIS — R06.02 SHORTNESS OF BREATH: Primary | ICD-10-CM

## 2023-03-15 DIAGNOSIS — I50.32 CHRONIC DIASTOLIC CHF (CONGESTIVE HEART FAILURE), NYHA CLASS 2 (HCC): ICD-10-CM

## 2023-03-15 DIAGNOSIS — I48.0 PAF (PAROXYSMAL ATRIAL FIBRILLATION) (HCC): ICD-10-CM

## 2023-03-15 PROBLEM — E11.9 TYPE 2 DIABETES MELLITUS (HCC): Status: ACTIVE | Noted: 2023-03-15

## 2023-03-15 PROCEDURE — 93280 PM DEVICE PROGR EVAL DUAL: CPT | Performed by: FAMILY MEDICINE

## 2023-03-15 NOTE — PROGRESS NOTES
Mr. Mendoza Clarke came in due to intermittent lightheadedness and dizziness with coughing spells and asked to have his pacemaker base rate increased. I interrogated his pacemaker and increased the rate from 65 bpm to 75 bpm. Will plan to cut back to 65 mg in 2 weeks or so once he is over this asthma exacerbation.

## 2023-03-15 NOTE — PATIENT INSTRUCTIONS
SURVEY:    You may be receiving a survey from [employer] regarding your visit today. Please complete the survey to enable us to provide the highest quality of care to you and your family. If you cannot score us a very good on any question, please call the office to discuss how we could have made your experience a very good one. Thank you.

## 2023-03-16 RX ORDER — PANTOPRAZOLE SODIUM 40 MG/1
40 TABLET, DELAYED RELEASE ORAL
Qty: 30 TABLET | Refills: 2 | Status: SHIPPED | OUTPATIENT
Start: 2023-03-16

## 2023-03-29 RX ORDER — PREDNISONE 10 MG/1
10 TABLET ORAL 2 TIMES DAILY
Qty: 60 TABLET | Refills: 2 | Status: SHIPPED | OUTPATIENT
Start: 2023-03-29 | End: 2023-04-28

## 2023-03-30 RX ORDER — FLUTICASONE FUROATE 100 UG/1
1 POWDER RESPIRATORY (INHALATION) DAILY
Qty: 30 EACH | Refills: 5 | Status: SHIPPED | OUTPATIENT
Start: 2023-03-30

## 2023-04-03 ENCOUNTER — OFFICE VISIT (OUTPATIENT)
Dept: PULMONOLOGY | Age: 73
End: 2023-04-03
Payer: MEDICARE

## 2023-04-03 VITALS
WEIGHT: 176.6 LBS | OXYGEN SATURATION: 100 % | RESPIRATION RATE: 16 BRPM | BODY MASS INDEX: 29.42 KG/M2 | DIASTOLIC BLOOD PRESSURE: 91 MMHG | HEART RATE: 76 BPM | HEIGHT: 65 IN | SYSTOLIC BLOOD PRESSURE: 147 MMHG | TEMPERATURE: 97.4 F

## 2023-04-03 DIAGNOSIS — I31.39 PERICARDIAL EFFUSION WITH CARDIAC TAMPONADE: ICD-10-CM

## 2023-04-03 DIAGNOSIS — G47.33 OSA (OBSTRUCTIVE SLEEP APNEA): ICD-10-CM

## 2023-04-03 DIAGNOSIS — I31.4 PERICARDIAL EFFUSION WITH CARDIAC TAMPONADE: ICD-10-CM

## 2023-04-03 DIAGNOSIS — J45.40 MODERATE PERSISTENT ASTHMA WITHOUT COMPLICATION: Primary | ICD-10-CM

## 2023-04-03 DIAGNOSIS — I48.0 PAROXYSMAL ATRIAL FIBRILLATION (HCC): ICD-10-CM

## 2023-04-03 DIAGNOSIS — Z95.0 PACEMAKER: ICD-10-CM

## 2023-04-03 DIAGNOSIS — K21.9 GASTROESOPHAGEAL REFLUX DISEASE, UNSPECIFIED WHETHER ESOPHAGITIS PRESENT: ICD-10-CM

## 2023-04-03 PROCEDURE — 99214 OFFICE O/P EST MOD 30 MIN: CPT | Performed by: INTERNAL MEDICINE

## 2023-04-03 PROCEDURE — 1036F TOBACCO NON-USER: CPT | Performed by: INTERNAL MEDICINE

## 2023-04-03 PROCEDURE — G8427 DOCREV CUR MEDS BY ELIG CLIN: HCPCS | Performed by: INTERNAL MEDICINE

## 2023-04-03 PROCEDURE — 3017F COLORECTAL CA SCREEN DOC REV: CPT | Performed by: INTERNAL MEDICINE

## 2023-04-03 PROCEDURE — G8417 CALC BMI ABV UP PARAM F/U: HCPCS | Performed by: INTERNAL MEDICINE

## 2023-04-03 PROCEDURE — 99203 OFFICE O/P NEW LOW 30 MIN: CPT | Performed by: INTERNAL MEDICINE

## 2023-04-03 PROCEDURE — 1123F ACP DISCUSS/DSCN MKR DOCD: CPT | Performed by: INTERNAL MEDICINE

## 2023-04-03 NOTE — PATIENT INSTRUCTIONS
SURVEY:    You may be receiving a survey from [employer] regarding your visit today. Please complete the survey to enable us to provide the highest quality of care to you and your family. If you cannot score us a very good on any question, please call the office to discuss how we could have made your experience a very good one. Thank you. Please recheck your blood pressure when you go home and make sure you take your prescribed medication if applicable . Please follow up with your PCP or ER if needed.

## 2023-04-03 NOTE — PROGRESS NOTES
[facility] [place] OF [address] [facility] [address] [place] [address]  Dept: [phone]  Dept Fax: [phone]      4/3/23    Patient: Dennie Ice  YOB: 1950    Dear Chucky Barnard MD,    I had the pleasure of seeing one of your patients, Christiano Fields today in the office today. Please find attached my note with the assessment and plan of care. Thank you for allowing me to participate in the care of this patient. I will keep you updated on this patient's follow up and I look forward to serving you and your patients again in the future.     Eric Yee MD  4/3/2023 3:48 PM
organomegaly. No rebound, rigidity or guarding was appreciated. Lymphatic: No lymphadenopathy. Musculoskeletal: Normal curvature of the spine. No gross muscle weakness. Extremities:  has trace lower extremity edema,  no ulcerations, tenderness, varicosities or erythema. Muscle size, tone and strength are normal.  No involuntary movements are noted. Skin:  Warm and dry. Good color, turgor and pigmentation. No lesions or scars. No cyanosis or clubbing  Neurological/Psychiatric: The patient's general behavior, level of consciousness, thought content and emotional status is normal.          DATA:     Pulmonary function tests: none        CXR: REVIEWED: None recently prior to the visit      IMPRESSION:   1. Moderate persistent asthma without complication    2. Gastroesophageal reflux disease, unspecified whether esophagitis present    3. Paroxysmal atrial fibrillation (HCC)    4. Pericardial effusion with cardiac tamponade    5. Pacemaker    6. NICHOLE (obstructive sleep apnea)                   PLAN:       Refills were provided -none  Patient was recommended to have prednisone and an antibiotic available for use during an exacerbation  Educated and clarified the medication use. Discussed use, benefit, and side effects of prescribed medications. Barriers to medication compliance addressed. Tejal Boland received counseling on the following healthy behaviors: nutrition, exercise and medication adherence  Recommend flu vaccination in the fall annually. Recommendations given regarding pneumococcal vaccinations. Had the COVID-19 vaccination. Recommended Bi Valent booster  Patient is not uptodate with vaccinations from pulmonary perspective. Maintain an active lifestyle. continue smoking cessation. Patient was educated on how to use the respiratory medications. All the questions that the patient has had were answered to   his satisfaction. Home O2 evaluation was done.   Supplemental oxygen was not

## 2023-04-04 ENCOUNTER — HOSPITAL ENCOUNTER (OUTPATIENT)
Age: 73
Discharge: HOME | End: 2023-04-04
Payer: MEDICARE

## 2023-04-04 ENCOUNTER — HOSPITAL ENCOUNTER (OUTPATIENT)
Dept: CT IMAGING | Age: 73
Discharge: HOME | End: 2023-04-06
Payer: MEDICARE

## 2023-04-04 ENCOUNTER — OFFICE VISIT (OUTPATIENT)
Dept: GASTROENTEROLOGY | Age: 73
End: 2023-04-04
Payer: MEDICARE

## 2023-04-04 VITALS
RESPIRATION RATE: 18 BRPM | HEART RATE: 88 BPM | HEIGHT: 65 IN | WEIGHT: 175.4 LBS | SYSTOLIC BLOOD PRESSURE: 150 MMHG | TEMPERATURE: 99 F | DIASTOLIC BLOOD PRESSURE: 89 MMHG | BODY MASS INDEX: 29.22 KG/M2

## 2023-04-04 DIAGNOSIS — R10.9 ABDOMINAL PAIN, UNSPECIFIED ABDOMINAL LOCATION: Primary | ICD-10-CM

## 2023-04-04 DIAGNOSIS — K21.9 GASTROESOPHAGEAL REFLUX DISEASE WITHOUT ESOPHAGITIS: Primary | ICD-10-CM

## 2023-04-04 DIAGNOSIS — Z87.19 HISTORY OF DIVERTICULOSIS: ICD-10-CM

## 2023-04-04 DIAGNOSIS — R10.10 PAIN OF UPPER ABDOMEN: Primary | ICD-10-CM

## 2023-04-04 DIAGNOSIS — R10.84 GENERALIZED ABDOMINAL PAIN: ICD-10-CM

## 2023-04-04 DIAGNOSIS — R50.9 FEVER, UNSPECIFIED FEVER CAUSE: ICD-10-CM

## 2023-04-04 DIAGNOSIS — D69.6 THROMBOCYTOPENIA (HCC): ICD-10-CM

## 2023-04-04 DIAGNOSIS — R50.9 FEVER, UNSPECIFIED FEVER CAUSE: Primary | ICD-10-CM

## 2023-04-04 DIAGNOSIS — K21.9 GASTROESOPHAGEAL REFLUX DISEASE WITHOUT ESOPHAGITIS: ICD-10-CM

## 2023-04-04 DIAGNOSIS — R10.10 PAIN OF UPPER ABDOMEN: ICD-10-CM

## 2023-04-04 LAB
ABSOLUTE EOS #: 0.06 K/UL (ref 0–0.44)
ABSOLUTE IMMATURE GRANULOCYTE: 0.03 K/UL (ref 0–0.3)
ABSOLUTE LYMPH #: 0.54 K/UL (ref 1.1–3.7)
ABSOLUTE MONO #: 0.46 K/UL (ref 0.1–1.2)
ALBUMIN SERPL-MCNC: 4.2 G/DL (ref 3.5–5.2)
ALBUMIN/GLOBULIN RATIO: 1.5 (ref 1–2.5)
ALP SERPL-CCNC: 76 U/L (ref 40–129)
ALT SERPL-CCNC: 24 U/L (ref 5–41)
ANION GAP SERPL CALCULATED.3IONS-SCNC: 9 MMOL/L (ref 9–17)
AST SERPL-CCNC: 16 U/L
BASOPHILS # BLD: 0 % (ref 0–2)
BASOPHILS ABSOLUTE: <0.03 K/UL (ref 0–0.2)
BILIRUB DIRECT SERPL-MCNC: <0.1 MG/DL
BILIRUB INDIRECT SERPL-MCNC: ABNORMAL MG/DL (ref 0–1)
BILIRUB SERPL-MCNC: 0.9 MG/DL (ref 0.3–1.2)
BUN SERPL-MCNC: 23 MG/DL (ref 8–23)
CALCIUM SERPL-MCNC: 9.4 MG/DL (ref 8.6–10.4)
CHLORIDE SERPL-SCNC: 100 MMOL/L (ref 98–107)
CO2 SERPL-SCNC: 30 MMOL/L (ref 20–31)
CREAT SERPL-MCNC: 1 MG/DL (ref 0.7–1.2)
CRP SERPL HS-MCNC: 14 MG/L (ref 0–5)
EOSINOPHILS RELATIVE PERCENT: 1 % (ref 1–4)
ERYTHROCYTE [SEDIMENTATION RATE] IN BLOOD BY WESTERGREN METHOD: 6 MM/HR (ref 0–20)
GFR SERPL CREATININE-BSD FRML MDRD: >60 ML/MIN/1.73M2
GLUCOSE SERPL-MCNC: 110 MG/DL (ref 70–99)
HCT VFR BLD AUTO: 42.3 % (ref 40.7–50.3)
HGB BLD-MCNC: 14.2 G/DL (ref 13–17)
IMMATURE GRANULOCYTES: 1 %
LYMPHOCYTES # BLD: 11 % (ref 24–43)
MCH RBC QN AUTO: 30.9 PG (ref 25.2–33.5)
MCHC RBC AUTO-ENTMCNC: 33.6 G/DL (ref 28.4–34.8)
MCV RBC AUTO: 92.2 FL (ref 82.6–102.9)
MONOCYTES # BLD: 10 % (ref 3–12)
NRBC AUTOMATED: 0 PER 100 WBC
PDW BLD-RTO: 13.1 % (ref 11.8–14.4)
PLATELET # BLD AUTO: 117 K/UL (ref 138–453)
PMV BLD AUTO: 9.2 FL (ref 8.1–13.5)
POTASSIUM SERPL-SCNC: 4.6 MMOL/L (ref 3.7–5.3)
PROT SERPL-MCNC: 7 G/DL (ref 6.4–8.3)
RBC # BLD: 4.59 M/UL (ref 4.21–5.77)
SEG NEUTROPHILS: 77 % (ref 36–65)
SEGMENTED NEUTROPHILS ABSOLUTE COUNT: 3.72 K/UL (ref 1.5–8.1)
SODIUM SERPL-SCNC: 139 MMOL/L (ref 135–144)
WBC # BLD AUTO: 4.8 K/UL (ref 3.5–11.3)

## 2023-04-04 PROCEDURE — 1123F ACP DISCUSS/DSCN MKR DOCD: CPT | Performed by: INTERNAL MEDICINE

## 2023-04-04 PROCEDURE — 85025 COMPLETE CBC W/AUTO DIFF WBC: CPT

## 2023-04-04 PROCEDURE — 36415 COLL VENOUS BLD VENIPUNCTURE: CPT

## 2023-04-04 PROCEDURE — 3017F COLORECTAL CA SCREEN DOC REV: CPT | Performed by: INTERNAL MEDICINE

## 2023-04-04 PROCEDURE — 99215 OFFICE O/P EST HI 40 MIN: CPT | Performed by: INTERNAL MEDICINE

## 2023-04-04 PROCEDURE — 82248 BILIRUBIN DIRECT: CPT

## 2023-04-04 PROCEDURE — 85652 RBC SED RATE AUTOMATED: CPT

## 2023-04-04 PROCEDURE — 83993 ASSAY FOR CALPROTECTIN FECAL: CPT

## 2023-04-04 PROCEDURE — G8417 CALC BMI ABV UP PARAM F/U: HCPCS | Performed by: INTERNAL MEDICINE

## 2023-04-04 PROCEDURE — 74177 CT ABD & PELVIS W/CONTRAST: CPT

## 2023-04-04 PROCEDURE — 6360000004 HC RX CONTRAST MEDICATION: Performed by: INTERNAL MEDICINE

## 2023-04-04 PROCEDURE — 80053 COMPREHEN METABOLIC PANEL: CPT

## 2023-04-04 PROCEDURE — 1036F TOBACCO NON-USER: CPT | Performed by: INTERNAL MEDICINE

## 2023-04-04 PROCEDURE — 86140 C-REACTIVE PROTEIN: CPT

## 2023-04-04 PROCEDURE — G8427 DOCREV CUR MEDS BY ELIG CLIN: HCPCS | Performed by: INTERNAL MEDICINE

## 2023-04-04 PROCEDURE — 99202 OFFICE O/P NEW SF 15 MIN: CPT | Performed by: INTERNAL MEDICINE

## 2023-04-04 RX ORDER — AZITHROMYCIN 500 MG/1
500 TABLET, FILM COATED ORAL DAILY
Qty: 3 TABLET | Refills: 0 | Status: ON HOLD
Start: 2023-04-04 | End: 2023-04-07 | Stop reason: HOSPADM

## 2023-04-04 RX ORDER — POLYETHYLENE GLYCOL 3350, SODIUM CHLORIDE, SODIUM BICARBONATE, POTASSIUM CHLORIDE 420; 11.2; 5.72; 1.48 G/4L; G/4L; G/4L; G/4L
4000 POWDER, FOR SOLUTION ORAL ONCE
Qty: 4000 ML | Refills: 0 | Status: SHIPPED | OUTPATIENT
Start: 2023-04-04 | End: 2023-04-04

## 2023-04-04 RX ADMIN — IOPAMIDOL 18 ML: 755 INJECTION, SOLUTION INTRAVENOUS at 14:05

## 2023-04-04 RX ADMIN — IOPAMIDOL 75 ML: 755 INJECTION, SOLUTION INTRAVENOUS at 14:04

## 2023-04-04 ASSESSMENT — ENCOUNTER SYMPTOMS
CONSTIPATION: 1
ABDOMINAL PAIN: 1

## 2023-04-04 NOTE — PROGRESS NOTES
1. Fever, unspecified fever cause, abdominal pain  - CBC with Auto Differential; Future  - Comprehensive Metabolic Panel with Bilirubin; Future

## 2023-04-04 NOTE — PROGRESS NOTES
1. Abdominal pain, unspecified abdominal location  - Colonoscopy  - polyethylene glycol-electrolytes (NULYTELY) 420 g solution;  Take 4,000 mLs by mouth once for 1 dose  Dispense: 4000 mL; Refill: 0

## 2023-04-04 NOTE — PROGRESS NOTES
Laterality Date    COLON SURGERY      perirectal abcess    COLONOSCOPY          COLONOSCOPY  02/28/2019        PACEMAKER INSERTION           Current Outpatient Medications   Medication Sig Dispense Refill    fluticasone (ARNUITY ELLIPTA) 100 MCG/ACT AEPB Inhale 1 inhalation into the lungs daily 30 each 5    predniSONE (DELTASONE) 10 MG tablet Take 1 tablet by mouth 2 times daily (Patient taking differently: Take 1 tablet by mouth 2 times daily 10MG am 5MG PM) 60 tablet 2    pantoprazole (PROTONIX) 40 MG tablet Take 1 tablet by mouth every morning (before breakfast) 30 tablet 2    tamsulosin (FLOMAX) 0.4 MG capsule Take 1 capsule by mouth daily 90 capsule 3    finasteride (PROSCAR) 5 MG tablet Take 1 tablet by mouth daily 90 tablet 3    levothyroxine (SYNTHROID) 125 MCG tablet Take 1 tablet by mouth Daily 90 tablet 3    ipratropium (ATROVENT) 0.03 % nasal spray 2 sprays by Each Nostril route in the morning and 2 sprays in the evening. 30 mL 3    allopurinol (ZYLOPRIM) 300 MG tablet Take 1 tablet by mouth daily 90 tablet 2    albuterol sulfate HFA (PROVENTIL;VENTOLIN;PROAIR) 108 (90 Base) MCG/ACT inhaler Inhale 2 puffs into the lungs every 6 hours as needed for Wheezing 18 g 2    metFORMIN (GLUCOPHAGE) 500 MG tablet Take 1 tablet by mouth 2 times daily (with meals) (Patient taking differently: Take 1 tablet by mouth daily (with breakfast)) 180 tablet 3    valsartan (DIOVAN) 40 MG tablet Take 1 tablet by mouth daily Patient taking half tablet daily. (Patient taking differently: Take 1 tablet by mouth daily) 90 tablet 3    ELIQUIS 5 MG TABS tablet TAKE ONE TABLET BY MOUTH TWICE A  tablet 3    atorvastatin (LIPITOR) 10 MG tablet Take 1 tablet by mouth daily 90 tablet 3    azithromycin (ZITHROMAX) 500 MG tablet Take 1 tablet by mouth daily for 3 days (Patient not taking: Reported on 4/4/2023) 3 tablet 0     No current facility-administered medications for this visit.          Family History   Problem

## 2023-04-04 NOTE — PROGRESS NOTES
4/4/2023  He has been having low grade fever andf upper abdominal pain onlt side since returning from [facility]. No diarrhea  Discussed options  Will get cbc,cmp,sed rate,crp,stool for calprotectin  Zithromax 500 mg daily for 3 days empirically

## 2023-04-04 NOTE — PATIENT INSTRUCTIONS
SURVEY:    You may be receiving a survey from [employer] regarding your visit today. Please complete the survey to enable us to provide the highest quality of care to you and your family. If you cannot score us a very good on any question, please call the office to discuss how we could have made your experience a very good one. Thank you.

## 2023-04-05 ENCOUNTER — APPOINTMENT (OUTPATIENT)
Dept: GENERAL RADIOLOGY | Age: 73
DRG: 178 | End: 2023-04-05
Payer: MEDICARE

## 2023-04-05 ENCOUNTER — HOSPITAL ENCOUNTER (EMERGENCY)
Age: 73
Discharge: HOME | DRG: 178 | End: 2023-04-05
Attending: EMERGENCY MEDICINE
Payer: MEDICARE

## 2023-04-05 ENCOUNTER — APPOINTMENT (OUTPATIENT)
Dept: CT IMAGING | Age: 73
DRG: 178 | End: 2023-04-05
Payer: MEDICARE

## 2023-04-05 ENCOUNTER — HOSPITAL ENCOUNTER (INPATIENT)
Age: 73
LOS: 2 days | Discharge: ANOTHER HOSPITAL | DRG: 178 | End: 2023-04-07
Attending: FAMILY MEDICINE | Admitting: FAMILY MEDICINE
Payer: MEDICARE

## 2023-04-05 VITALS
SYSTOLIC BLOOD PRESSURE: 125 MMHG | DIASTOLIC BLOOD PRESSURE: 76 MMHG | RESPIRATION RATE: 21 BRPM | OXYGEN SATURATION: 92 % | TEMPERATURE: 101 F | HEART RATE: 90 BPM

## 2023-04-05 DIAGNOSIS — R50.9 FEVER, UNSPECIFIED FEVER CAUSE: ICD-10-CM

## 2023-04-05 DIAGNOSIS — N17.9 ACUTE KIDNEY INJURY (HCC): ICD-10-CM

## 2023-04-05 DIAGNOSIS — B34.9 VIRAL SYNDROME: Primary | ICD-10-CM

## 2023-04-05 PROBLEM — K80.20 CALCULUS OF GALLBLADDER WITHOUT CHOLECYSTITIS WITHOUT OBSTRUCTION: Status: ACTIVE | Noted: 2023-04-05

## 2023-04-05 LAB
ABSOLUTE EOS #: 0.12 K/UL (ref 0–0.44)
ABSOLUTE IMMATURE GRANULOCYTE: 0.03 K/UL (ref 0–0.3)
ABSOLUTE LYMPH #: 0.72 K/UL (ref 1.1–3.7)
ABSOLUTE MONO #: 0.51 K/UL (ref 0.1–1.2)
ADENOVIRUS PCR: NOT DETECTED
ALBUMIN SERPL-MCNC: 3.7 G/DL (ref 3.5–5.2)
ALBUMIN/GLOBULIN RATIO: 1.4 (ref 1–2.5)
ALP SERPL-CCNC: 64 U/L (ref 40–129)
ALT SERPL-CCNC: 22 U/L (ref 5–41)
ANION GAP SERPL CALCULATED.3IONS-SCNC: 7 MMOL/L (ref 9–17)
AST SERPL-CCNC: 18 U/L
B PARAP IS1001 DNA NPH QL NAA+NON-PROBE: NOT DETECTED
B PERT DNA SPEC QL NAA+PROBE: NOT DETECTED
BASOPHILS # BLD: 1 % (ref 0–2)
BASOPHILS ABSOLUTE: <0.03 K/UL (ref 0–0.2)
BILIRUB SERPL-MCNC: 1.1 MG/DL (ref 0.3–1.2)
BILIRUBIN URINE: NEGATIVE
BUN SERPL-MCNC: 28 MG/DL (ref 8–23)
BUN/CREAT BLD: 23 (ref 9–20)
CALCIUM SERPL-MCNC: 8.9 MG/DL (ref 8.6–10.4)
CHLAMYDIA PNEUMONIAE BY PCR: NOT DETECTED
CHLORIDE SERPL-SCNC: 96 MMOL/L (ref 98–107)
CO2 SERPL-SCNC: 30 MMOL/L (ref 20–31)
COLOR: YELLOW
CORONAVIRUS 229E PCR: NOT DETECTED
CORONAVIRUS HKU1 PCR: NOT DETECTED
CORONAVIRUS NL63 PCR: NOT DETECTED
CORONAVIRUS OC43 PCR: NOT DETECTED
CREAT SERPL-MCNC: 1.22 MG/DL (ref 0.7–1.2)
EOSINOPHILS RELATIVE PERCENT: 3 % (ref 1–4)
EPITHELIAL CELLS UA: NORMAL /HPF (ref 0–5)
FLUAV AG SPEC QL: NEGATIVE
FLUAV RNA NPH QL NAA+NON-PROBE: NOT DETECTED
FLUBV AG SPEC QL: NEGATIVE
FLUBV RNA NPH QL NAA+NON-PROBE: NOT DETECTED
GFR SERPL CREATININE-BSD FRML MDRD: >60 ML/MIN/1.73M2
GLUCOSE SERPL-MCNC: 93 MG/DL (ref 70–99)
GLUCOSE UR STRIP.AUTO-MCNC: NEGATIVE MG/DL
HCT VFR BLD AUTO: 41.3 % (ref 40.7–50.3)
HGB BLD-MCNC: 14 G/DL (ref 13–17)
HUMAN METAPNEUMOVIRUS PCR: NOT DETECTED
IMMATURE GRANULOCYTES: 1 %
INR PPP: 1.1
KETONES UR STRIP.AUTO-MCNC: NEGATIVE MG/DL
LACTATE PLASV-SCNC: 1.3 MMOL/L (ref 0.5–2.2)
LEUKOCYTE ESTERASE UR QL STRIP.AUTO: NEGATIVE
LIPASE SERPL-CCNC: 63 U/L (ref 13–60)
LYMPHOCYTES # BLD: 17 % (ref 24–43)
MCH RBC QN AUTO: 30.8 PG (ref 25.2–33.5)
MCHC RBC AUTO-ENTMCNC: 33.9 G/DL (ref 28.4–34.8)
MCV RBC AUTO: 90.8 FL (ref 82.6–102.9)
MONOCYTES # BLD: 12 % (ref 3–12)
MYCOPLASMA PNEUMONIAE PCR: NOT DETECTED
NITRITE UR QL STRIP.AUTO: NEGATIVE
NRBC AUTOMATED: 0 PER 100 WBC
PARAINFLUENZA 1 PCR: NOT DETECTED
PARAINFLUENZA 2 PCR: NOT DETECTED
PARAINFLUENZA 3 PCR: NOT DETECTED
PARAINFLUENZA 4 PCR: NOT DETECTED
PDW BLD-RTO: 13.2 % (ref 11.8–14.4)
PLATELET # BLD AUTO: ABNORMAL K/UL (ref 138–453)
PLATELET, FLUORESCENCE: 91 K/UL (ref 138–453)
PLATELET, IMMATURE FRACTION: 1.5 % (ref 1.1–10.3)
POTASSIUM SERPL-SCNC: 4.3 MMOL/L (ref 3.7–5.3)
PROT SERPL-MCNC: 6.3 G/DL (ref 6.4–8.3)
PROT UR STRIP.AUTO-MCNC: 7 MG/DL (ref 5–9)
PROT UR STRIP.AUTO-MCNC: NEGATIVE MG/DL
PROTHROMBIN TIME: 14.3 SEC (ref 11.9–14.8)
RBC # BLD: 4.55 M/UL (ref 4.21–5.77)
RBC CLUMPS #/AREA URNS AUTO: NORMAL /HPF (ref 0–2)
RESP SYNCYTIAL VIRUS PCR: NOT DETECTED
RHINO/ENTEROVIRUS PCR: NOT DETECTED
SARS-COV-2 RDRP RESP QL NAA+PROBE: NOT DETECTED
SARS-COV-2 RNA NPH QL NAA+NON-PROBE: DETECTED
SEG NEUTROPHILS: 67 % (ref 36–65)
SEGMENTED NEUTROPHILS ABSOLUTE COUNT: 2.82 K/UL (ref 1.5–8.1)
SODIUM SERPL-SCNC: 133 MMOL/L (ref 135–144)
SPECIFIC GRAVITY UA: 1.01 (ref 1.01–1.02)
SPECIMEN DESCRIPTION: ABNORMAL
SPECIMEN DESCRIPTION: NORMAL
TURBIDITY: CLEAR
URINE HGB: NEGATIVE
UROBILINOGEN, URINE: ABNORMAL
WBC # BLD AUTO: 4.2 K/UL (ref 3.5–11.3)
WBC UA: NORMAL /HPF (ref 0–5)

## 2023-04-05 PROCEDURE — 2580000003 HC RX 258: Performed by: NURSE PRACTITIONER

## 2023-04-05 PROCEDURE — 81001 URINALYSIS AUTO W/SCOPE: CPT

## 2023-04-05 PROCEDURE — 2580000003 HC RX 258: Performed by: EMERGENCY MEDICINE

## 2023-04-05 PROCEDURE — 87804 INFLUENZA ASSAY W/OPTIC: CPT

## 2023-04-05 PROCEDURE — 85055 RETICULATED PLATELET ASSAY: CPT

## 2023-04-05 PROCEDURE — 99285 EMERGENCY DEPT VISIT HI MDM: CPT

## 2023-04-05 PROCEDURE — 85025 COMPLETE CBC W/AUTO DIFF WBC: CPT

## 2023-04-05 PROCEDURE — 83690 ASSAY OF LIPASE: CPT

## 2023-04-05 PROCEDURE — 87086 URINE CULTURE/COLONY COUNT: CPT

## 2023-04-05 PROCEDURE — 0202U NFCT DS 22 TRGT SARS-COV-2: CPT

## 2023-04-05 PROCEDURE — 85610 PROTHROMBIN TIME: CPT

## 2023-04-05 PROCEDURE — 80053 COMPREHEN METABOLIC PANEL: CPT

## 2023-04-05 PROCEDURE — 70450 CT HEAD/BRAIN W/O DYE: CPT

## 2023-04-05 PROCEDURE — 87040 BLOOD CULTURE FOR BACTERIA: CPT

## 2023-04-05 PROCEDURE — 6370000000 HC RX 637 (ALT 250 FOR IP): Performed by: EMERGENCY MEDICINE

## 2023-04-05 PROCEDURE — 6370000000 HC RX 637 (ALT 250 FOR IP): Performed by: NURSE PRACTITIONER

## 2023-04-05 PROCEDURE — 83605 ASSAY OF LACTIC ACID: CPT

## 2023-04-05 PROCEDURE — 71045 X-RAY EXAM CHEST 1 VIEW: CPT

## 2023-04-05 PROCEDURE — 94664 DEMO&/EVAL PT USE INHALER: CPT

## 2023-04-05 PROCEDURE — 94669 MECHANICAL CHEST WALL OSCILL: CPT

## 2023-04-05 PROCEDURE — 93005 ELECTROCARDIOGRAM TRACING: CPT | Performed by: EMERGENCY MEDICINE

## 2023-04-05 PROCEDURE — 1200000000 HC SEMI PRIVATE

## 2023-04-05 PROCEDURE — 87635 SARS-COV-2 COVID-19 AMP PRB: CPT

## 2023-04-05 PROCEDURE — 94640 AIRWAY INHALATION TREATMENT: CPT

## 2023-04-05 RX ORDER — ONDANSETRON 2 MG/ML
4 INJECTION INTRAMUSCULAR; INTRAVENOUS EVERY 6 HOURS PRN
Status: DISCONTINUED | OUTPATIENT
Start: 2023-04-05 | End: 2023-04-07 | Stop reason: HOSPADM

## 2023-04-05 RX ORDER — SODIUM CHLORIDE 9 MG/ML
INJECTION, SOLUTION INTRAVENOUS CONTINUOUS
Status: ACTIVE | OUTPATIENT
Start: 2023-04-05 | End: 2023-04-06

## 2023-04-05 RX ORDER — BUDESONIDE 0.25 MG/2ML
0.25 INHALANT ORAL 2 TIMES DAILY
Status: DISCONTINUED | OUTPATIENT
Start: 2023-04-05 | End: 2023-04-06

## 2023-04-05 RX ORDER — 0.9 % SODIUM CHLORIDE 0.9 %
500 INTRAVENOUS SOLUTION INTRAVENOUS ONCE
Status: COMPLETED | OUTPATIENT
Start: 2023-04-05 | End: 2023-04-05

## 2023-04-05 RX ORDER — ACETAMINOPHEN 325 MG/1
650 TABLET ORAL EVERY 6 HOURS PRN
Status: DISCONTINUED | OUTPATIENT
Start: 2023-04-05 | End: 2023-04-07 | Stop reason: HOSPADM

## 2023-04-05 RX ORDER — ALBUTEROL SULFATE 90 UG/1
2 AEROSOL, METERED RESPIRATORY (INHALATION) EVERY 6 HOURS PRN
Status: DISCONTINUED | OUTPATIENT
Start: 2023-04-05 | End: 2023-04-07 | Stop reason: HOSPADM

## 2023-04-05 RX ORDER — TAMSULOSIN HYDROCHLORIDE 0.4 MG/1
0.4 CAPSULE ORAL DAILY
Status: DISCONTINUED | OUTPATIENT
Start: 2023-04-05 | End: 2023-04-07 | Stop reason: HOSPADM

## 2023-04-05 RX ORDER — FINASTERIDE 5 MG/1
5 TABLET, FILM COATED ORAL DAILY
Status: DISCONTINUED | OUTPATIENT
Start: 2023-04-05 | End: 2023-04-07 | Stop reason: HOSPADM

## 2023-04-05 RX ORDER — LEVOTHYROXINE SODIUM 0.12 MG/1
125 TABLET ORAL DAILY
Status: DISCONTINUED | OUTPATIENT
Start: 2023-04-05 | End: 2023-04-07 | Stop reason: HOSPADM

## 2023-04-05 RX ORDER — POLYETHYLENE GLYCOL 3350 17 G/17G
17 POWDER, FOR SOLUTION ORAL DAILY PRN
Status: DISCONTINUED | OUTPATIENT
Start: 2023-04-05 | End: 2023-04-07 | Stop reason: HOSPADM

## 2023-04-05 RX ORDER — SODIUM CHLORIDE 0.9 % (FLUSH) 0.9 %
10 SYRINGE (ML) INJECTION EVERY 12 HOURS SCHEDULED
Status: DISCONTINUED | OUTPATIENT
Start: 2023-04-05 | End: 2023-04-07 | Stop reason: HOSPADM

## 2023-04-05 RX ORDER — PANTOPRAZOLE SODIUM 40 MG/1
40 TABLET, DELAYED RELEASE ORAL
Status: DISCONTINUED | OUTPATIENT
Start: 2023-04-06 | End: 2023-04-07 | Stop reason: HOSPADM

## 2023-04-05 RX ORDER — SODIUM CHLORIDE 9 MG/ML
INJECTION, SOLUTION INTRAVENOUS PRN
Status: DISCONTINUED | OUTPATIENT
Start: 2023-04-05 | End: 2023-04-07 | Stop reason: HOSPADM

## 2023-04-05 RX ORDER — SODIUM CHLORIDE 0.9 % (FLUSH) 0.9 %
10 SYRINGE (ML) INJECTION PRN
Status: DISCONTINUED | OUTPATIENT
Start: 2023-04-05 | End: 2023-04-07 | Stop reason: HOSPADM

## 2023-04-05 RX ORDER — VALSARTAN 80 MG/1
40 TABLET ORAL DAILY
Status: DISCONTINUED | OUTPATIENT
Start: 2023-04-05 | End: 2023-04-07 | Stop reason: HOSPADM

## 2023-04-05 RX ORDER — ONDANSETRON 4 MG/1
4 TABLET, ORALLY DISINTEGRATING ORAL EVERY 8 HOURS PRN
Status: DISCONTINUED | OUTPATIENT
Start: 2023-04-05 | End: 2023-04-07 | Stop reason: HOSPADM

## 2023-04-05 RX ORDER — ATORVASTATIN CALCIUM 10 MG/1
10 TABLET, FILM COATED ORAL DAILY
Status: DISCONTINUED | OUTPATIENT
Start: 2023-04-05 | End: 2023-04-07 | Stop reason: HOSPADM

## 2023-04-05 RX ORDER — 0.9 % SODIUM CHLORIDE 0.9 %
1000 INTRAVENOUS SOLUTION INTRAVENOUS ONCE
Status: COMPLETED | OUTPATIENT
Start: 2023-04-05 | End: 2023-04-05

## 2023-04-05 RX ORDER — ACETAMINOPHEN 500 MG
1000 TABLET ORAL ONCE
Status: COMPLETED | OUTPATIENT
Start: 2023-04-05 | End: 2023-04-05

## 2023-04-05 RX ORDER — ALLOPURINOL 300 MG/1
300 TABLET ORAL DAILY
Status: DISCONTINUED | OUTPATIENT
Start: 2023-04-05 | End: 2023-04-07 | Stop reason: HOSPADM

## 2023-04-05 RX ORDER — ACETAMINOPHEN 650 MG/1
650 SUPPOSITORY RECTAL EVERY 6 HOURS PRN
Status: DISCONTINUED | OUTPATIENT
Start: 2023-04-05 | End: 2023-04-07 | Stop reason: HOSPADM

## 2023-04-05 RX ORDER — ACETAMINOPHEN 500 MG
500 TABLET ORAL ONCE
Status: COMPLETED | OUTPATIENT
Start: 2023-04-05 | End: 2023-04-05

## 2023-04-05 RX ORDER — SODIUM CHLORIDE 9 MG/ML
INJECTION, SOLUTION INTRAVENOUS CONTINUOUS
Status: DISCONTINUED | OUTPATIENT
Start: 2023-04-05 | End: 2023-04-05

## 2023-04-05 RX ORDER — SODIUM CHLORIDE 9 MG/ML
INJECTION, SOLUTION INTRAVENOUS CONTINUOUS
Status: DISCONTINUED | OUTPATIENT
Start: 2023-04-06 | End: 2023-04-07

## 2023-04-05 RX ORDER — IPRATROPIUM BROMIDE 21 UG/1
2 SPRAY, METERED NASAL EVERY 12 HOURS
Status: DISCONTINUED | OUTPATIENT
Start: 2023-04-05 | End: 2023-04-05

## 2023-04-05 RX ORDER — ALBUTEROL SULFATE 90 UG/1
AEROSOL, METERED RESPIRATORY (INHALATION)
Status: DISPENSED
Start: 2023-04-05 | End: 2023-04-06

## 2023-04-05 RX ADMIN — ACETAMINOPHEN 500 MG: 500 TABLET, FILM COATED ORAL at 07:45

## 2023-04-05 RX ADMIN — SODIUM CHLORIDE: 9 INJECTION, SOLUTION INTRAVENOUS at 16:42

## 2023-04-05 RX ADMIN — TAMSULOSIN HYDROCHLORIDE 0.4 MG: 0.4 CAPSULE ORAL at 16:37

## 2023-04-05 RX ADMIN — ATORVASTATIN CALCIUM 10 MG: 10 TABLET, FILM COATED ORAL at 16:36

## 2023-04-05 RX ADMIN — ALLOPURINOL 300 MG: 300 TABLET ORAL at 16:37

## 2023-04-05 RX ADMIN — SODIUM CHLORIDE 1000 ML: 9 INJECTION, SOLUTION INTRAVENOUS at 08:03

## 2023-04-05 RX ADMIN — APIXABAN 5 MG: 5 TABLET, FILM COATED ORAL at 20:07

## 2023-04-05 RX ADMIN — LEVOTHYROXINE SODIUM 125 MCG: 125 TABLET ORAL at 16:36

## 2023-04-05 RX ADMIN — ACETAMINOPHEN 500 MG: 500 TABLET, FILM COATED ORAL at 08:51

## 2023-04-05 RX ADMIN — FINASTERIDE 5 MG: 5 TABLET, FILM COATED ORAL at 16:37

## 2023-04-05 RX ADMIN — ACETAMINOPHEN 650 MG: 325 TABLET ORAL at 20:08

## 2023-04-05 RX ADMIN — VALSARTAN 40 MG: 80 TABLET, FILM COATED ORAL at 16:36

## 2023-04-05 RX ADMIN — SODIUM CHLORIDE 500 ML: 9 INJECTION, SOLUTION INTRAVENOUS at 09:38

## 2023-04-05 ASSESSMENT — PAIN DESCRIPTION - ONSET
ONSET: ON-GOING
ONSET: ON-GOING

## 2023-04-05 ASSESSMENT — PAIN DESCRIPTION - FREQUENCY
FREQUENCY: CONTINUOUS
FREQUENCY: CONTINUOUS

## 2023-04-05 ASSESSMENT — PAIN DESCRIPTION - LOCATION
LOCATION: HEAD
LOCATION: GENERALIZED
LOCATION: HEAD

## 2023-04-05 ASSESSMENT — PAIN DESCRIPTION - DESCRIPTORS
DESCRIPTORS: ACHING

## 2023-04-05 ASSESSMENT — ENCOUNTER SYMPTOMS
ABDOMINAL PAIN: 1
BACK PAIN: 0
ABDOMINAL DISTENTION: 0
SORE THROAT: 0
SHORTNESS OF BREATH: 0

## 2023-04-05 ASSESSMENT — PAIN DESCRIPTION - PAIN TYPE
TYPE: ACUTE PAIN

## 2023-04-05 ASSESSMENT — PAIN DESCRIPTION - ORIENTATION
ORIENTATION: INNER
ORIENTATION: INNER

## 2023-04-05 ASSESSMENT — PAIN SCALES - GENERAL
PAINLEVEL_OUTOF10: 3
PAINLEVEL_OUTOF10: 4
PAINLEVEL_OUTOF10: 5

## 2023-04-05 ASSESSMENT — PAIN - FUNCTIONAL ASSESSMENT: PAIN_FUNCTIONAL_ASSESSMENT: 0-10

## 2023-04-05 NOTE — H&P
normal.  Abdomen is soft. No distention. no tenderness to palpation. EXT:   no edema bilaterally . No calf tenderness. NEURO: Moves all extremities. Motor and sensory are grossly intact  SKIN:  No rashes. No skin lesions.    -----------------------------------------------------------------  Diagnostic Data:     DATA:    CBC:   Lab Results   Component Value Date    WBC 4.2 04/05/2023    RBC 4.55 04/05/2023    HGB 14.0 04/05/2023    HCT 41.3 04/05/2023    MCV 90.8 04/05/2023    PLT See Reflexed IPF Result 04/05/2023        CMP:   Lab Results   Component Value Date    GLUCOSE 93 04/05/2023    BUN 28 (H) 04/05/2023    CREATININE 1.22 (H) 04/05/2023     (L) 04/05/2023    K 4.3 04/05/2023    CALCIUM 8.9 04/05/2023    CL 96 (L) 04/05/2023    CO2 30 04/05/2023    PROT 6.3 (L) 04/05/2023    LABALBU 3.7 04/05/2023    BILITOT 1.1 04/05/2023    ALKPHOS 64 04/05/2023    ALT 22 04/05/2023    AST 18 04/05/2023       UA:   Lab Results   Component Value Date    COLORU Yellow 04/05/2023    SPECGRAV 1.010 04/05/2023    WBCUA None 04/05/2023    RBCUA None 04/05/2023    EPITHUA None 04/05/2023    LEUKOCYTESUR NEGATIVE 04/05/2023    GLUCOSEU NEGATIVE 04/05/2023    KETUA NEGATIVE 04/05/2023    PROTEINU NEGATIVE 04/05/2023    HGBUR NEGATIVE 04/05/2023    CASTUA NOT REPORTED 04/02/2013    CRYSTUA NOT REPORTED 04/02/2013    BACTERIA NOT REPORTED 04/02/2013    YEAST NOT REPORTED 04/02/2013       Lactic Acid:   Lab Results   Component Value Date    LACTA 1.3 04/05/2023       D-Dimer:  No results found for: DDIMER    PT/INR:  Lab Results   Component Value Date/Time    PROTIME 14.3 04/05/2023 07:50 AM    INR 1.1 04/05/2023 07:50 AM       High Sensitivity Troponin:  No results for input(s): TROPHS in the last 72 hours.     ABGs:   No results found for: PHART, PH, WTC5VJI, PCO2, PO2ART, PO2, CGX8UZP, HCO3, BEART, BE, THGBART, THB, IFW7TKB, D7DIERVW, O2SAT, FIO2        NM HEPATOBILIARY SCAN W EJECTION FRACTION    (Results Pending)

## 2023-04-05 NOTE — ED PROVIDER NOTES
Skin:     General: Skin is warm and dry. Capillary Refill: Capillary refill takes less than 2 seconds. Neurological:      General: No focal deficit present. Mental Status: He is alert and oriented to person, place, and time. Psychiatric:         Mood and Affect: Mood normal.       DIAGNOSTIC RESULTS     EKG: All EKG's are interpreted by the Emergency Department Physician who either signs or Co-signs this chart in the absence of a cardiologist.    Normal sinus rhythm with a heart rate of 92. No acute ST-T wave changes. There is presence of right bundle branch block. RADIOLOGY:   Non-plain film images such as CT, Ultrasound and MRI are read by the radiologist. Plain radiographic images are visualized and preliminarily interpreted by the emergency physician with the below findings:      Interpretation per the Radiologist below, if available at the time of this note:    XR CHEST PORTABLE   Final Result   Minimal left basilar atelectasis. CT Head W/O Contrast   Final Result   No acute intracranial abnormality.          NM HEPATOBILIARY    (Results Pending)         ED BEDSIDE ULTRASOUND:   Performed by ED Physician - none    LABS:  Labs Reviewed   CBC WITH AUTO DIFFERENTIAL - Abnormal; Notable for the following components:       Result Value    Seg Neutrophils 67 (*)     Lymphocytes 17 (*)     Immature Granulocytes 1 (*)     Absolute Lymph # 0.72 (*)     All other components within normal limits   COMPREHENSIVE METABOLIC PANEL - Abnormal; Notable for the following components:    BUN 28 (*)     Creatinine 1.22 (*)     Bun/Cre Ratio 23 (*)     Sodium 133 (*)     Chloride 96 (*)     Anion Gap 7 (*)     Total Protein 6.3 (*)     All other components within normal limits   LIPASE - Abnormal; Notable for the following components:    Lipase 63 (*)     All other components within normal limits   URINALYSIS WITH REFLEX TO CULTURE - Abnormal; Notable for the following components:    Urobilinogen, Urine

## 2023-04-05 NOTE — DISCHARGE INSTRUCTIONS
Make sure to stay on top of the fever. Take Tylenol every 6-8 hours. Drink plenty of fluids. We will call you with the blood culture results and the respiratory viral panel results if there are any abnormalities. Return if your symptoms get worse. Follow-up with your family doctor within the next 1 week. We will try to schedule a HIDA scan for you tomorrow. If you have any concerns or questions regarding your care today, please discuss with your nurse or physician prior to leaving the emergency department. Thank you for allowing us to take care of you at [facility]..  In the next few days you may receive a survey by mail or e-mail asking about the care you received during this visit. Please complete this if you are able, as this feedback helps us provide the best care possible.

## 2023-04-06 ENCOUNTER — APPOINTMENT (OUTPATIENT)
Dept: NUCLEAR MEDICINE | Age: 73
DRG: 178 | End: 2023-04-06
Attending: FAMILY MEDICINE
Payer: MEDICARE

## 2023-04-06 PROBLEM — U07.1 COVID-19 VIRUS INFECTION: Status: ACTIVE | Noted: 2023-04-06

## 2023-04-06 PROBLEM — D69.6 THROMBOCYTOPENIA (HCC): Status: ACTIVE | Noted: 2023-04-06

## 2023-04-06 LAB
ABSOLUTE EOS #: 0.07 K/UL (ref 0–0.44)
ABSOLUTE IMMATURE GRANULOCYTE: 0 K/UL (ref 0–0.3)
ABSOLUTE LYMPH #: 0.7 K/UL (ref 1.1–3.7)
ABSOLUTE MONO #: 0.46 K/UL (ref 0.1–1.2)
ALBUMIN SERPL-MCNC: 3.2 G/DL (ref 3.5–5.2)
ALBUMIN/GLOBULIN RATIO: 1.2 (ref 1–2.5)
ALP SERPL-CCNC: 59 U/L (ref 40–129)
ALT SERPL-CCNC: 23 U/L (ref 5–41)
ANION GAP SERPL CALCULATED.3IONS-SCNC: 7 MMOL/L (ref 9–17)
AST SERPL-CCNC: 23 U/L
BASOPHILS # BLD: 0 % (ref 0–2)
BASOPHILS ABSOLUTE: 0 K/UL (ref 0–0.2)
BILIRUB SERPL-MCNC: 1.5 MG/DL (ref 0.3–1.2)
BUN SERPL-MCNC: 22 MG/DL (ref 8–23)
BUN/CREAT BLD: 25 (ref 9–20)
CALCIUM SERPL-MCNC: 8.6 MG/DL (ref 8.6–10.4)
CALPROTECTIN, FECAL: 64 UG/G
CHLORIDE SERPL-SCNC: 106 MMOL/L (ref 98–107)
CO2 SERPL-SCNC: 24 MMOL/L (ref 20–31)
CREAT SERPL-MCNC: 0.89 MG/DL (ref 0.7–1.2)
CRP SERPL HS-MCNC: 102 MG/L (ref 0–5)
EKG ATRIAL RATE: 92 BPM
EKG P AXIS: 39 DEGREES
EKG P-R INTERVAL: 128 MS
EKG Q-T INTERVAL: 374 MS
EKG QRS DURATION: 142 MS
EKG QTC CALCULATION (BAZETT): 462 MS
EKG R AXIS: -36 DEGREES
EKG T AXIS: 10 DEGREES
EKG VENTRICULAR RATE: 92 BPM
EOSINOPHILS RELATIVE PERCENT: 3 % (ref 1–4)
GFR SERPL CREATININE-BSD FRML MDRD: >60 ML/MIN/1.73M2
GLUCOSE SERPL-MCNC: 104 MG/DL (ref 70–99)
HCT VFR BLD AUTO: 39.2 % (ref 40.7–50.3)
HGB BLD-MCNC: 13.5 G/DL (ref 13–17)
IMMATURE GRANULOCYTES: 0 %
LDLC SERPL-MCNC: 246 U/L (ref 135–225)
LYMPHOCYTES # BLD: 32 % (ref 24–43)
MCH RBC QN AUTO: 30.6 PG (ref 25.2–33.5)
MCHC RBC AUTO-ENTMCNC: 34.4 G/DL (ref 28.4–34.8)
MCV RBC AUTO: 88.9 FL (ref 82.6–102.9)
MICROORGANISM SPEC CULT: NO GROWTH
MONOCYTES # BLD: 21 % (ref 3–12)
MORPHOLOGY: NORMAL
NRBC AUTOMATED: 0 PER 100 WBC
PDW BLD-RTO: 13.1 % (ref 11.8–14.4)
PLATELET # BLD AUTO: ABNORMAL K/UL (ref 138–453)
PLATELET, FLUORESCENCE: 62 K/UL (ref 138–453)
PLATELET, IMMATURE FRACTION: 1.5 % (ref 1.1–10.3)
POTASSIUM SERPL-SCNC: 4.5 MMOL/L (ref 3.7–5.3)
PROT SERPL-MCNC: 5.8 G/DL (ref 6.4–8.3)
RBC # BLD: 4.41 M/UL (ref 4.21–5.77)
SEG NEUTROPHILS: 44 % (ref 36–65)
SEGMENTED NEUTROPHILS ABSOLUTE COUNT: 0.97 K/UL (ref 1.5–8.1)
SODIUM SERPL-SCNC: 137 MMOL/L (ref 135–144)
SPECIMEN DESCRIPTION: NORMAL
WBC # BLD AUTO: 2.2 K/UL (ref 3.5–11.3)

## 2023-04-06 PROCEDURE — 80053 COMPREHEN METABOLIC PANEL: CPT

## 2023-04-06 PROCEDURE — 86140 C-REACTIVE PROTEIN: CPT

## 2023-04-06 PROCEDURE — XW033E5 INTRODUCTION OF REMDESIVIR ANTI-INFECTIVE INTO PERIPHERAL VEIN, PERCUTANEOUS APPROACH, NEW TECHNOLOGY GROUP 5: ICD-10-PCS | Performed by: INTERNAL MEDICINE

## 2023-04-06 PROCEDURE — 2580000003 HC RX 258: Performed by: FAMILY MEDICINE

## 2023-04-06 PROCEDURE — 78227 HEPATOBIL SYST IMAGE W/DRUG: CPT

## 2023-04-06 PROCEDURE — 83615 LACTATE (LD) (LDH) ENZYME: CPT

## 2023-04-06 PROCEDURE — 6360000002 HC RX W HCPCS: Performed by: FAMILY MEDICINE

## 2023-04-06 PROCEDURE — 6370000000 HC RX 637 (ALT 250 FOR IP): Performed by: INTERNAL MEDICINE

## 2023-04-06 PROCEDURE — A9537 TC99M MEBROFENIN: HCPCS | Performed by: FAMILY MEDICINE

## 2023-04-06 PROCEDURE — 85055 RETICULATED PLATELET ASSAY: CPT

## 2023-04-06 PROCEDURE — 99221 1ST HOSP IP/OBS SF/LOW 40: CPT | Performed by: INTERNAL MEDICINE

## 2023-04-06 PROCEDURE — 2580000003 HC RX 258: Performed by: INTERNAL MEDICINE

## 2023-04-06 PROCEDURE — 36415 COLL VENOUS BLD VENIPUNCTURE: CPT

## 2023-04-06 PROCEDURE — 3430000000 HC RX DIAGNOSTIC RADIOPHARMACEUTICAL: Performed by: FAMILY MEDICINE

## 2023-04-06 PROCEDURE — 6360000002 HC RX W HCPCS: Performed by: INTERNAL MEDICINE

## 2023-04-06 PROCEDURE — 94664 DEMO&/EVAL PT USE INHALER: CPT

## 2023-04-06 PROCEDURE — 94761 N-INVAS EAR/PLS OXIMETRY MLT: CPT

## 2023-04-06 PROCEDURE — 1200000000 HC SEMI PRIVATE

## 2023-04-06 PROCEDURE — 93010 ELECTROCARDIOGRAM REPORT: CPT | Performed by: FAMILY MEDICINE

## 2023-04-06 PROCEDURE — 94640 AIRWAY INHALATION TREATMENT: CPT

## 2023-04-06 PROCEDURE — 94669 MECHANICAL CHEST WALL OSCILL: CPT

## 2023-04-06 PROCEDURE — 2580000003 HC RX 258: Performed by: NURSE PRACTITIONER

## 2023-04-06 PROCEDURE — XW0DXF5 INTRODUCTION OF OTHER NEW TECHNOLOGY THERAPEUTIC SUBSTANCE INTO MOUTH AND PHARYNX, EXTERNAL APPROACH, NEW TECHNOLOGY GROUP 5: ICD-10-PCS | Performed by: INTERNAL MEDICINE

## 2023-04-06 PROCEDURE — 6370000000 HC RX 637 (ALT 250 FOR IP): Performed by: NURSE PRACTITIONER

## 2023-04-06 PROCEDURE — 85025 COMPLETE CBC W/AUTO DIFF WBC: CPT

## 2023-04-06 PROCEDURE — 6360000002 HC RX W HCPCS: Performed by: NURSE PRACTITIONER

## 2023-04-06 RX ADMIN — NIRMATRELVIR AND RITONAVIR 3 TABLET: KIT at 09:52

## 2023-04-06 RX ADMIN — REMDESIVIR 200 MG: 100 INJECTION, POWDER, LYOPHILIZED, FOR SOLUTION INTRAVENOUS at 11:09

## 2023-04-06 RX ADMIN — ONDANSETRON 4 MG: 2 INJECTION INTRAMUSCULAR; INTRAVENOUS at 05:26

## 2023-04-06 RX ADMIN — FINASTERIDE 5 MG: 5 TABLET, FILM COATED ORAL at 09:51

## 2023-04-06 RX ADMIN — LEVOTHYROXINE SODIUM 125 MCG: 125 TABLET ORAL at 07:03

## 2023-04-06 RX ADMIN — TAMSULOSIN HYDROCHLORIDE 0.4 MG: 0.4 CAPSULE ORAL at 09:51

## 2023-04-06 RX ADMIN — VALSARTAN 40 MG: 80 TABLET, FILM COATED ORAL at 09:51

## 2023-04-06 RX ADMIN — SODIUM CHLORIDE: 9 INJECTION, SOLUTION INTRAVENOUS at 01:18

## 2023-04-06 RX ADMIN — ALLOPURINOL 300 MG: 300 TABLET ORAL at 09:51

## 2023-04-06 RX ADMIN — ACETAMINOPHEN 650 MG: 325 TABLET ORAL at 19:13

## 2023-04-06 RX ADMIN — ACETAMINOPHEN 650 MG: 325 TABLET ORAL at 13:08

## 2023-04-06 RX ADMIN — PANTOPRAZOLE SODIUM 40 MG: 40 TABLET, DELAYED RELEASE ORAL at 10:12

## 2023-04-06 RX ADMIN — SODIUM CHLORIDE: 9 INJECTION, SOLUTION INTRAVENOUS at 23:58

## 2023-04-06 RX ADMIN — SODIUM CHLORIDE 1.6 MCG: 9 INJECTION, SOLUTION INTRAVENOUS at 08:31

## 2023-04-06 RX ADMIN — ACETAMINOPHEN 650 MG: 325 TABLET ORAL at 05:33

## 2023-04-06 RX ADMIN — SODIUM CHLORIDE: 9 INJECTION, SOLUTION INTRAVENOUS at 13:44

## 2023-04-06 RX ADMIN — Medication 5 MILLICURIE: at 07:31

## 2023-04-06 RX ADMIN — SODIUM CHLORIDE: 9 INJECTION, SOLUTION INTRAVENOUS at 04:13

## 2023-04-06 RX ADMIN — SODIUM CHLORIDE, PRESERVATIVE FREE 10 ML: 5 INJECTION INTRAVENOUS at 09:52

## 2023-04-06 RX ADMIN — ONDANSETRON 4 MG: 2 INJECTION INTRAMUSCULAR; INTRAVENOUS at 19:14

## 2023-04-06 ASSESSMENT — PAIN DESCRIPTION - LOCATION
LOCATION: HEAD

## 2023-04-06 ASSESSMENT — PAIN DESCRIPTION - ORIENTATION
ORIENTATION: MID
ORIENTATION: MID
ORIENTATION: INNER

## 2023-04-06 ASSESSMENT — PAIN SCALES - GENERAL
PAINLEVEL_OUTOF10: 5
PAINLEVEL_OUTOF10: 5
PAINLEVEL_OUTOF10: 4
PAINLEVEL_OUTOF10: 5
PAINLEVEL_OUTOF10: 5

## 2023-04-06 ASSESSMENT — PAIN DESCRIPTION - PAIN TYPE
TYPE: ACUTE PAIN

## 2023-04-06 ASSESSMENT — PAIN DESCRIPTION - FREQUENCY
FREQUENCY: CONTINUOUS

## 2023-04-06 ASSESSMENT — PAIN DESCRIPTION - DESCRIPTORS
DESCRIPTORS: ACHING

## 2023-04-06 ASSESSMENT — PAIN DESCRIPTION - ONSET
ONSET: ON-GOING

## 2023-04-06 ASSESSMENT — PAIN - FUNCTIONAL ASSESSMENT
PAIN_FUNCTIONAL_ASSESSMENT: ACTIVITIES ARE NOT PREVENTED

## 2023-04-06 ASSESSMENT — PAIN SCALES - WONG BAKER: WONGBAKER_NUMERICALRESPONSE: 0

## 2023-04-06 NOTE — CONSULTS
Remdesivir Initiation Note    This patient meets criteria for initiation of remdesivir based on the following:  Proven COVID-19  Moderate disease  Acceptable hepatic function (ALT within 5 times ULN)    Exclusion Criteria:  Severe disease requiring invasive or non-invasive mechanical ventilation (includes HFNC & BiPAP)  Could consider use in patients requiring high flow if early on in the disease course (based on symptom duration)  Use of more than 1 vasopressor prior to remdesivir initiation  Already improving on supportive treatment and/or impending discharge  Patients in whom the clinical team think death is in the immediate short-term where remdesivir is unlikely to change the clinical outcome        Latest Reference Range & Units 04/06/23 05:50   Est, Glom Filt Rate >60 mL/min/1.73m2 >60   ALT 5 - 41 U/L 23   AST <40 U/L 23     Liver function tests will be monitored daily while on remdesivir.    Gabrielle Bunn, PharmD, 4/6/2023 10:25 AM
reactive to light; sclera anicteric; conjunctivae pink. No embolic phenomena. ENT: Oropharynx clear, without erythema, exudate, or thrush. No tenderness of sinuses. Mouth/throat: mucosa pink and moist. No lesions. Dentition in good repair. Neck:Supple, without lymphadenopathy. Thyroid normal, No bruits. Pulmonary/Chest: Clear to auscultation, without wheezes, rales, or rhonchi. No dullness to percussion. Cardiovascular: Regular rate and rhythm without murmurs, rubs, or gallops. Abdomen: Soft, non tender. Bowel sounds normal. No organomegaly  All four Extremities: No cyanosis, clubbing, edema, or effusions. Neurologic: No gross sensory or motor deficits. Skin: Warm and dry with good turgor. No signs of peripheral arterial or venous insufficiency. No ulcerations. No open wounds. Medical Decision Making -Laboratory:   I have independently reviewed/ordered the following labs:    CBC with Differential:   Recent Labs     04/05/23  0750 04/06/23  0550   WBC 4.2 2.2*   HGB 14.0 13.5   HCT 41.3 39.2*   PLT See Reflexed IPF Result See Reflexed IPF Result   LYMPHOPCT 17* 32   MONOPCT 12 21*     BMP:   Recent Labs     04/05/23  0750 04/06/23  0550   * 137   K 4.3 4.5   CL 96* 106   CO2 30 24   BUN 28* 22   CREATININE 1.22* 0.89     Hepatic Function Panel:   Recent Labs     04/04/23  0945 04/05/23  0750 04/06/23  0550   PROT 7.0 6.3* 5.8*   LABALBU 4.2 3.7 3.2*   BILIDIR <0.1  --   --    IBILI Can not be calculated  --   --    BILITOT 0.9 1.1 1.5*   ALKPHOS 76 64 59   ALT 24 22 23   AST 16 18 23     No results for input(s): RPR in the last 72 hours. No results for input(s): HIV in the last 72 hours. No results for input(s): BC in the last 72 hours.   Lab Results   Component Value Date/Time    MUCUS NOT REPORTED 04/02/2013 09:00 PM    RBC 4.41 04/06/2023 05:50 AM    RBC 3.54 01/21/2012 05:00 AM    TRICHOMONAS NOT REPORTED 04/02/2013 09:00 PM    WBC 2.2 04/06/2023 05:50 AM    YEAST NOT REPORTED 04/02/2013 09:00

## 2023-04-06 NOTE — PLAN OF CARE
Problem: Discharge Planning  Goal: Discharge to home or other facility with appropriate resources  Outcome: Progressing  Flowsheets (Taken 4/6/2023 0126)  Discharge to home or other facility with appropriate resources:   Identify barriers to discharge with patient and caregiver   Arrange for needed discharge resources and transportation as appropriate   Identify discharge learning needs (meds, wound care, etc)     Problem: Pain  Goal: Verbalizes/displays adequate comfort level or baseline comfort level  Outcome: Progressing  Flowsheets (Taken 4/6/2023 0126)  Verbalizes/displays adequate comfort level or baseline comfort level:   Encourage patient to monitor pain and request assistance   Assess pain using appropriate pain scale   Administer analgesics based on type and severity of pain and evaluate response   Implement non-pharmacological measures as appropriate and evaluate response   Consider cultural and social influences on pain and pain management     Problem: Safety - Adult  Goal: Free from fall injury  Outcome: Progressing  Flowsheets (Taken 4/6/2023 0126)  Free From Fall Injury: Instruct family/caregiver on patient safety

## 2023-04-06 NOTE — CARE COORDINATION
who? No  Plans to Return to Present Housing: Yes  Other Identified Issues/Barriers to RETURNING to current housing: none  Potential Assistance needed at discharge: [address]            Potential DME: Cpap  Patient expects to discharge to: [facility] for transportation at discharge: Family    Financial    Payor: Gee Diaz / Plan: MEDICARE PART A AND B / Product Type: *No Product type* /     Does insurance require precert for SNF: No    Potential assistance Purchasing Medications: No  Meds-to-Beds request:  No      [facility] [address] [address]  Phone: [phone] Fax: [phone]      Notes:    Factors facilitating achievement of predicted outcomes: Family support, Friend support, Motivated, Cooperative, Pleasant, Sense of humor, and Good insight into deficits    Barriers to discharge: none    Additional Case Management Notes: Discussed discharge plans with the patient and wife. Patient is  and lives at home with his wife. He uses a C-pap machine at home. Patient's wife does the cooking and the cleaning. He is independent with his ADL's. Patient manages his own medication and drives. He has no outside services in the home. The discharge plan is home with no services at this time. The Plan for Transition of Care is related to the following treatment goals of Dehydration [E86.0]  Fever [R50.9]  JOEL (acute kidney injury) ([facility] [address] [N17.9]  COVID-19 virus infection [U07.1]    Transportation/Food Security/Housekeeping Addressed: No issues identified or concerns. The Patient and/or Patient Representative Agree with the Discharge Plan? Yes    The discharge plan is home with no services at this time.     Jesús Wiseman, [facility]  Case Management Department  Ph: [phone]

## 2023-04-06 NOTE — PLAN OF CARE
Problem: Discharge Planning  Goal: Discharge to home or other facility with appropriate resources  4/6/2023 0921 by Lopoly  Outcome: Progressing     Problem: Pain  Goal: Verbalizes/displays adequate comfort level or baseline comfort level  4/6/2023 0921 by Lopoly  Outcome: Progressing     Problem: Safety - Adult  Goal: Free from fall injury  4/6/2023 0921 by Lopoly  Outcome: Progressing

## 2023-04-06 NOTE — RT PROTOCOL NOTE
Bilateral Breath Sounds (BBS) [x]  Clear []  Diminish-ed bases  []  Diminish-ed t/o, or rales   []  Sporadic, scattered wheezes or rhonchi []  Persistentwheezes and, or absent BBS 0   Cough [x]  Strong, effective, & non-prod. []  Effective & prod. Less than 25 ml (2 TBSP) over past 24 hrs []  Ineffective & non-prod to less than 25 ML over past 24 hrs []  Ineffective and, or greater than 25 ml sputum prod. past 24 hrs. []  Nonspon- taneous; Requires suctioning 0   Pulmonary History  (PULM HX) []  No smoking and no chronic pulmonary history []  Former smoker. Quit over 12 mos. ago []  Current smoker or quit w/ in 12 mos [x]  Pulm. History and, or 20 pk/yr smoking hx []  Admitted w/ acute pulm. dx and, or has been admitted w/ pulm. dx 2 or more times over past 12 mos 3   Surgical History this Admit  (SURG HX) [x]  No surgery []  General surgery []  Lower abdominal []  Thoracic or upper abdominal   []  Thoracic w/ pulm. disease 0   Chest X-Ray (CXR)/CT Scan []  Clear or not applicable []  Not available [x]  Atelectasis or pleural effusions []  Localized infiltrate or pulm. edema []  Con-solidated Infiltrates, bilateral, or in more than 1 lobe 2   TOTAL ACUITY: 5       CARE PLAN    If Acuity Level is 2, 3, or 4 in any of the following:    [] BILATERAL BREATH SOUNDS (BBS)     [x] PULMONARY HISTORY (PULM HX)  [] Respiratory Rate  (RR)    Goal: Improve respiratory functions in patients with airway disease and decrease WOB    [x] AEROSOL PROTOCOL    Total Acuity:   14-28  []  Secondary Assessment in 24 hrs Total Acuity:  9-13  []  Secondary Assessment in 24 hrs Total Acuity:  4-8  [x]  Secondary Assessment in 24 hrs Total Acuity:  0-3  []  Secondary Assessment in 48 hrs   HHN AEROSOL THERAPY with  [physician-ordered bronchodilator(s)] q 4 & Albuterol PRN q2 hrs. Breath-Actuated Neb if BBS Acuity = 4, and pt. can use MP. Notify physician if condition deteriorates.   [facility] AEROSOL THERAPY with  [physician-ordered
with  [physician-ordered bronchodilator(s)]  QID and Albuterol PRN q4 hrs. Breath-Actuated Neb if BBS Acuity = 4, and pt. can use MP. Notify physician if condition deteriorates. MDI THERAPY with  2 actuations of [physician-ordered bronchodilator(s)] via spacer TID Albuterol and PRN q4 hrs. If unable to utilize MDI: HHN [physician-ordered bronchodilator(s)] TID and Albuterol PRN q4 hrs. Notify physician if condition deteriorates. MDI THERAPY with  [physician-ordered bronchodilator(s)] via spacer TID PRN. If unable to utilize MDI: HHN [physician-ordered bronchodilator(s)] TID PRN. Notify physician if condition deteriorates. If Acuity Level is 2, 3, or 4 in any of the following:    [] COUGH     [] SURGICAL HISTORY (SURG HX)  [x] CHEST XRAY (CXR)    Goal: Improvement in sputum mobilization in patients with ineffective airway clearance. Reverse atelectasis. [x] Bronchopulmonary Hygiene Protocol      Total Acuity:   14-28  []  Secondary Assessment in 24 hrs Total Acuity:  9-13  []  Secondary Assessment in 24 hrs Total Acuity:  4-8  [x]  Secondary Assessment in 24 hrs Total Acuity:  0-3  []  Secondary Assessment in 48 hrs   METANEB QID with [physician-ordered bronchodilator(s)] if CXR Acuity = 4; otherwise:  PD&P, Oscillatory Therapy, or Vest QID & PRN AND PEP QID & PRN  NT Sxn PRN for ineffective cough  METANEB QID with [physician-ordered bronchodilator(s)] if CXR Acuity = 4; otherwise:  PD&P, Oscillatory Therapy or Vest QID & PRN AND PEP QID & PRN  NT Sxn PRN for ineffective cough  PD&P, Oscillatory Therapy, or Vest TID & PRN AND PEP TID & PRN   Instruct patient to self-perform IS q1hr WA       If Acuity Level is 2 or above in the following:    [] PULMONARY HISTORY (PULM HX)    Goal: Assist patient in quitting smoking to slow or stop the progression of lung disease.     [] Smoking Cessation Protocol    SMOKING CESSATION EDUCATION provided according to policy [id number]: (niraj with an X)  ____Yes    ____

## 2023-04-07 ENCOUNTER — HOSPITAL ENCOUNTER (INPATIENT)
Age: 73
LOS: 3 days | Discharge: HOME | DRG: 177 | End: 2023-04-10
Attending: INTERNAL MEDICINE | Admitting: STUDENT IN AN ORGANIZED HEALTH CARE EDUCATION/TRAINING PROGRAM
Payer: MEDICARE

## 2023-04-07 VITALS
WEIGHT: 175.43 LBS | HEART RATE: 81 BPM | SYSTOLIC BLOOD PRESSURE: 157 MMHG | DIASTOLIC BLOOD PRESSURE: 96 MMHG | OXYGEN SATURATION: 99 % | RESPIRATION RATE: 20 BRPM | TEMPERATURE: 99.2 F | HEIGHT: 64 IN | BODY MASS INDEX: 29.95 KG/M2

## 2023-04-07 DIAGNOSIS — D69.6 THROMBOCYTOPENIA (HCC): ICD-10-CM

## 2023-04-07 DIAGNOSIS — D72.810 LYMPHOCYTOPENIA: Primary | ICD-10-CM

## 2023-04-07 PROBLEM — D70.3 NEUTROPENIA ASSOCIATED WITH INFECTION (HCC): Status: ACTIVE | Noted: 2023-04-07

## 2023-04-07 PROBLEM — I10 ESSENTIAL HYPERTENSION: Status: ACTIVE | Noted: 2023-04-07

## 2023-04-07 PROBLEM — E66.9 OBESITY: Status: ACTIVE | Noted: 2023-04-07

## 2023-04-07 PROBLEM — D61.818 PANCYTOPENIA (HCC): Status: ACTIVE | Noted: 2023-04-07

## 2023-04-07 LAB
ABSOLUTE EOS #: 0 K/UL (ref 0–0.4)
ABSOLUTE EOS #: 0.08 K/UL (ref 0–0.44)
ABSOLUTE IMMATURE GRANULOCYTE: 0 K/UL (ref 0–0.3)
ABSOLUTE IMMATURE GRANULOCYTE: 0 K/UL (ref 0–0.3)
ABSOLUTE LYMPH #: 0.34 K/UL (ref 1.1–3.7)
ABSOLUTE LYMPH #: 0.52 K/UL (ref 1–4.8)
ABSOLUTE MONO #: 0.12 K/UL (ref 0.1–0.8)
ABSOLUTE MONO #: 0.34 K/UL (ref 0.1–1.2)
ABSOLUTE RETIC #: 0.07 M/UL (ref 0.03–0.08)
ABSOLUTE RETIC #: 0.07 M/UL (ref 0.03–0.08)
ALBUMIN SERPL-MCNC: 2.9 G/DL (ref 3.5–5.2)
ALBUMIN/GLOBULIN RATIO: 1.1 (ref 1–2.5)
ALP SERPL-CCNC: 55 U/L (ref 40–129)
ALT SERPL-CCNC: 24 U/L (ref 5–41)
ANION GAP SERPL CALCULATED.3IONS-SCNC: 9 MMOL/L (ref 9–17)
AST SERPL-CCNC: 31 U/L
ATYPICAL LYMPHOCYTE ABSOLUTE COUNT: 0.11 K/UL
ATYPICAL LYMPHOCYTES: 8 %
BASOPHILS # BLD: 0 % (ref 0–2)
BASOPHILS # BLD: 0 % (ref 0–2)
BASOPHILS ABSOLUTE: 0 K/UL (ref 0–0.2)
BASOPHILS ABSOLUTE: 0 K/UL (ref 0–0.2)
BILIRUB SERPL-MCNC: 1 MG/DL (ref 0.3–1.2)
BUN SERPL-MCNC: 19 MG/DL (ref 8–23)
BUN/CREAT BLD: 19 (ref 9–20)
CALCIUM SERPL-MCNC: 8.3 MG/DL (ref 8.6–10.4)
CELLS COUNTED: 50
CELLS COUNTED: 50
CHLORIDE SERPL-SCNC: 101 MMOL/L (ref 98–107)
CO2 SERPL-SCNC: 22 MMOL/L (ref 20–31)
CREAT SERPL-MCNC: 1.01 MG/DL (ref 0.7–1.2)
CRP SERPL HS-MCNC: 113.3 MG/L (ref 0–5)
D DIMER BLD IA.RAPID-MCNC: 0.99 MG/L FEU (ref 0–0.59)
EOSINOPHILS RELATIVE PERCENT: 0 % (ref 1–4)
EOSINOPHILS RELATIVE PERCENT: 6 % (ref 1–4)
FERRITIN SERPL-MCNC: 499 NG/ML (ref 30–400)
FERRITIN SERPL-MCNC: 538 NG/ML (ref 30–400)
FIBRINOGEN: 415 MG/DL (ref 179–518)
FOLATE SERPL-MCNC: 14.3 NG/ML
GFR SERPL CREATININE-BSD FRML MDRD: >60 ML/MIN/1.73M2
GLUCOSE BLD-MCNC: 100 MG/DL (ref 75–110)
GLUCOSE BLD-MCNC: 171 MG/DL (ref 75–110)
GLUCOSE SERPL-MCNC: 115 MG/DL (ref 70–99)
HAPTOGLOB SERPL-MCNC: 101 MG/DL (ref 30–200)
HCT VFR BLD AUTO: 36.6 % (ref 40.7–50.3)
HCT VFR BLD AUTO: 42.6 % (ref 40.7–50.3)
HGB BLD-MCNC: 12.9 G/DL (ref 13–17)
HGB BLD-MCNC: 14.8 G/DL (ref 13–17)
IMMATURE GRANULOCYTES: 0 %
IMMATURE GRANULOCYTES: 0 %
IMMATURE RETIC FRACT: 12.7 % (ref 2.7–18.3)
IMMATURE RETIC FRACT: 5.9 % (ref 2.7–18.3)
INR PPP: 1.3
IRON SATURATION: 22 % (ref 20–55)
IRON SERPL-MCNC: 43 UG/DL (ref 59–158)
LDLC SERPL-MCNC: 255 U/L (ref 135–225)
LYMPHOCYTES # BLD: 24 % (ref 24–43)
LYMPHOCYTES # BLD: 52 % (ref 24–44)
MCH RBC QN AUTO: 30.9 PG (ref 25.2–33.5)
MCH RBC QN AUTO: 31.1 PG (ref 25.2–33.5)
MCHC RBC AUTO-ENTMCNC: 34.7 G/DL (ref 28.4–34.8)
MCHC RBC AUTO-ENTMCNC: 35.2 G/DL (ref 28.4–34.8)
MCV RBC AUTO: 87.6 FL (ref 82.6–102.9)
MCV RBC AUTO: 89.5 FL (ref 82.6–102.9)
MONOCYTES # BLD: 12 % (ref 1–7)
MONOCYTES # BLD: 24 % (ref 3–12)
MORPHOLOGY: ABNORMAL
MORPHOLOGY: ABNORMAL
NRBC AUTOMATED: 0 PER 100 WBC
NRBC AUTOMATED: 0 PER 100 WBC
PARTIAL THROMBOPLASTIN TIME: 44.9 SEC (ref 26.8–34.8)
PDW BLD-RTO: 12.7 % (ref 11.8–14.4)
PDW BLD-RTO: 12.8 % (ref 11.8–14.4)
PLATELET # BLD AUTO: ABNORMAL K/UL (ref 138–453)
PLATELET # BLD AUTO: ABNORMAL K/UL (ref 138–453)
PLATELET, FLUORESCENCE: 23 K/UL (ref 138–453)
PLATELET, FLUORESCENCE: 30 K/UL (ref 138–453)
PLATELET, IMMATURE FRACTION: 1.1 % (ref 1.1–10.3)
PLATELET, IMMATURE FRACTION: 3 % (ref 1.1–10.3)
POTASSIUM SERPL-SCNC: 4.3 MMOL/L (ref 3.7–5.3)
PROT SERPL-MCNC: 5.5 G/DL (ref 6.4–8.3)
PROTHROMBIN TIME: 16 SEC (ref 11.9–14.8)
RBC # BLD: 4.18 M/UL (ref 4.21–5.77)
RBC # BLD: 4.76 M/UL (ref 4.21–5.77)
RETIC HEMOGLOBIN: 33.3 PG (ref 28.2–35.7)
RETIC HEMOGLOBIN: 33.5 PG (ref 28.2–35.7)
RETICS/RBC NFR AUTO: 1.6 % (ref 0.5–1.9)
RETICS/RBC NFR AUTO: 1.8 % (ref 0.5–1.9)
SEG NEUTROPHILS: 36 % (ref 36–66)
SEG NEUTROPHILS: 38 % (ref 36–65)
SEGMENTED NEUTROPHILS ABSOLUTE COUNT: 0.36 K/UL (ref 1.8–7.7)
SEGMENTED NEUTROPHILS ABSOLUTE COUNT: 0.53 K/UL (ref 1.5–8.1)
SODIUM SERPL-SCNC: 132 MMOL/L (ref 135–144)
TIBC SERPL-MCNC: 200 UG/DL (ref 250–450)
UNSATURATED IRON BINDING CAPACITY: 157 UG/DL (ref 112–347)
VIT B12 SERPL-MCNC: 560 PG/ML (ref 232–1245)
WBC # BLD AUTO: 1 K/UL (ref 3.5–11.3)
WBC # BLD AUTO: 1.4 K/UL (ref 3.5–11.3)

## 2023-04-07 PROCEDURE — 83615 LACTATE (LD) (LDH) ENZYME: CPT

## 2023-04-07 PROCEDURE — 82607 VITAMIN B-12: CPT

## 2023-04-07 PROCEDURE — 6370000000 HC RX 637 (ALT 250 FOR IP): Performed by: INTERNAL MEDICINE

## 2023-04-07 PROCEDURE — 85055 RETICULATED PLATELET ASSAY: CPT

## 2023-04-07 PROCEDURE — 99285 EMERGENCY DEPT VISIT HI MDM: CPT

## 2023-04-07 PROCEDURE — 94669 MECHANICAL CHEST WALL OSCILL: CPT

## 2023-04-07 PROCEDURE — APPSS30 APP SPLIT SHARED TIME 16-30 MINUTES: Performed by: NURSE PRACTITIONER

## 2023-04-07 PROCEDURE — 83540 ASSAY OF IRON: CPT

## 2023-04-07 PROCEDURE — 2060000000 HC ICU INTERMEDIATE R&B

## 2023-04-07 PROCEDURE — 80053 COMPREHEN METABOLIC PANEL: CPT

## 2023-04-07 PROCEDURE — 6360000002 HC RX W HCPCS: Performed by: INTERNAL MEDICINE

## 2023-04-07 PROCEDURE — 94761 N-INVAS EAR/PLS OXIMETRY MLT: CPT

## 2023-04-07 PROCEDURE — 82947 ASSAY GLUCOSE BLOOD QUANT: CPT

## 2023-04-07 PROCEDURE — 99222 1ST HOSP IP/OBS MODERATE 55: CPT | Performed by: INTERNAL MEDICINE

## 2023-04-07 PROCEDURE — 36415 COLL VENOUS BLD VENIPUNCTURE: CPT

## 2023-04-07 PROCEDURE — 85025 COMPLETE CBC W/AUTO DIFF WBC: CPT

## 2023-04-07 PROCEDURE — 85384 FIBRINOGEN ACTIVITY: CPT

## 2023-04-07 PROCEDURE — 94640 AIRWAY INHALATION TREATMENT: CPT

## 2023-04-07 PROCEDURE — 85379 FIBRIN DEGRADATION QUANT: CPT

## 2023-04-07 PROCEDURE — 2580000003 HC RX 258: Performed by: INTERNAL MEDICINE

## 2023-04-07 PROCEDURE — 85730 THROMBOPLASTIN TIME PARTIAL: CPT

## 2023-04-07 PROCEDURE — 6370000000 HC RX 637 (ALT 250 FOR IP): Performed by: NURSE PRACTITIONER

## 2023-04-07 PROCEDURE — 83550 IRON BINDING TEST: CPT

## 2023-04-07 PROCEDURE — 85045 AUTOMATED RETICULOCYTE COUNT: CPT

## 2023-04-07 PROCEDURE — 86140 C-REACTIVE PROTEIN: CPT

## 2023-04-07 PROCEDURE — 85610 PROTHROMBIN TIME: CPT

## 2023-04-07 PROCEDURE — 2580000003 HC RX 258: Performed by: NURSE PRACTITIONER

## 2023-04-07 PROCEDURE — 82746 ASSAY OF FOLIC ACID SERUM: CPT

## 2023-04-07 PROCEDURE — 83010 ASSAY OF HAPTOGLOBIN QUANT: CPT

## 2023-04-07 PROCEDURE — 82728 ASSAY OF FERRITIN: CPT

## 2023-04-07 PROCEDURE — 99233 SBSQ HOSP IP/OBS HIGH 50: CPT | Performed by: INTERNAL MEDICINE

## 2023-04-07 RX ORDER — ACETAMINOPHEN 325 MG/1
650 TABLET ORAL EVERY 6 HOURS PRN
Status: DISCONTINUED | OUTPATIENT
Start: 2023-04-07 | End: 2023-04-10 | Stop reason: HOSPADM

## 2023-04-07 RX ORDER — PANTOPRAZOLE SODIUM 40 MG/1
40 TABLET, DELAYED RELEASE ORAL
Status: DISCONTINUED | OUTPATIENT
Start: 2023-04-07 | End: 2023-04-10 | Stop reason: HOSPADM

## 2023-04-07 RX ORDER — FINASTERIDE 5 MG/1
5 TABLET, FILM COATED ORAL DAILY
Status: DISCONTINUED | OUTPATIENT
Start: 2023-04-07 | End: 2023-04-10 | Stop reason: HOSPADM

## 2023-04-07 RX ORDER — INSULIN LISPRO 100 [IU]/ML
0-8 INJECTION, SOLUTION INTRAVENOUS; SUBCUTANEOUS
Status: DISCONTINUED | OUTPATIENT
Start: 2023-04-07 | End: 2023-04-10 | Stop reason: HOSPADM

## 2023-04-07 RX ORDER — 0.9 % SODIUM CHLORIDE 0.9 %
30 INTRAVENOUS SOLUTION INTRAVENOUS PRN
Status: DISCONTINUED | OUTPATIENT
Start: 2023-04-07 | End: 2023-04-10 | Stop reason: HOSPADM

## 2023-04-07 RX ORDER — DOCUSATE SODIUM 100 MG/1
100 CAPSULE, LIQUID FILLED ORAL DAILY
Status: DISCONTINUED | OUTPATIENT
Start: 2023-04-07 | End: 2023-04-10 | Stop reason: HOSPADM

## 2023-04-07 RX ORDER — SODIUM CHLORIDE 9 MG/ML
INJECTION, SOLUTION INTRAVENOUS PRN
Status: DISCONTINUED | OUTPATIENT
Start: 2023-04-07 | End: 2023-04-10 | Stop reason: HOSPADM

## 2023-04-07 RX ORDER — VALSARTAN 40 MG/1
40 TABLET ORAL DAILY
Qty: 30 TABLET | Refills: 0 | Status: SHIPPED | OUTPATIENT
Start: 2023-04-07 | End: 2023-04-11 | Stop reason: SDUPTHER

## 2023-04-07 RX ORDER — SODIUM CHLORIDE 0.9 % (FLUSH) 0.9 %
5-40 SYRINGE (ML) INJECTION EVERY 12 HOURS SCHEDULED
Status: DISCONTINUED | OUTPATIENT
Start: 2023-04-07 | End: 2023-04-10 | Stop reason: HOSPADM

## 2023-04-07 RX ORDER — TAMSULOSIN HYDROCHLORIDE 0.4 MG/1
0.4 CAPSULE ORAL DAILY
Status: DISCONTINUED | OUTPATIENT
Start: 2023-04-07 | End: 2023-04-10 | Stop reason: HOSPADM

## 2023-04-07 RX ORDER — SODIUM CHLORIDE 0.9 % (FLUSH) 0.9 %
5-40 SYRINGE (ML) INJECTION PRN
Status: DISCONTINUED | OUTPATIENT
Start: 2023-04-07 | End: 2023-04-10 | Stop reason: HOSPADM

## 2023-04-07 RX ORDER — DEXTROSE MONOHYDRATE 100 MG/ML
INJECTION, SOLUTION INTRAVENOUS CONTINUOUS PRN
Status: DISCONTINUED | OUTPATIENT
Start: 2023-04-07 | End: 2023-04-10 | Stop reason: HOSPADM

## 2023-04-07 RX ORDER — ALLOPURINOL 300 MG/1
300 TABLET ORAL DAILY
Status: DISCONTINUED | OUTPATIENT
Start: 2023-04-07 | End: 2023-04-10 | Stop reason: HOSPADM

## 2023-04-07 RX ORDER — LEVOTHYROXINE SODIUM 0.12 MG/1
125 TABLET ORAL DAILY
Status: DISCONTINUED | OUTPATIENT
Start: 2023-04-08 | End: 2023-04-10 | Stop reason: HOSPADM

## 2023-04-07 RX ORDER — ONDANSETRON 4 MG/1
4 TABLET, ORALLY DISINTEGRATING ORAL EVERY 8 HOURS PRN
Status: DISCONTINUED | OUTPATIENT
Start: 2023-04-07 | End: 2023-04-10 | Stop reason: HOSPADM

## 2023-04-07 RX ORDER — PANTOPRAZOLE SODIUM 40 MG/1
40 TABLET, DELAYED RELEASE ORAL
Status: DISCONTINUED | OUTPATIENT
Start: 2023-04-08 | End: 2023-04-07

## 2023-04-07 RX ORDER — FLUTICASONE PROPIONATE 50 MCG
1 SPRAY, SUSPENSION (ML) NASAL DAILY
Status: DISCONTINUED | OUTPATIENT
Start: 2023-04-07 | End: 2023-04-07

## 2023-04-07 RX ORDER — INSULIN LISPRO 100 [IU]/ML
0-4 INJECTION, SOLUTION INTRAVENOUS; SUBCUTANEOUS NIGHTLY
Status: DISCONTINUED | OUTPATIENT
Start: 2023-04-07 | End: 2023-04-10 | Stop reason: HOSPADM

## 2023-04-07 RX ORDER — ONDANSETRON 2 MG/ML
4 INJECTION INTRAMUSCULAR; INTRAVENOUS EVERY 6 HOURS PRN
Status: DISCONTINUED | OUTPATIENT
Start: 2023-04-07 | End: 2023-04-10 | Stop reason: HOSPADM

## 2023-04-07 RX ORDER — POLYETHYLENE GLYCOL 3350 17 G/17G
17 POWDER, FOR SOLUTION ORAL DAILY PRN
Status: DISCONTINUED | OUTPATIENT
Start: 2023-04-07 | End: 2023-04-10 | Stop reason: HOSPADM

## 2023-04-07 RX ORDER — DEXAMETHASONE SODIUM PHOSPHATE 10 MG/ML
6 INJECTION, SOLUTION INTRAMUSCULAR; INTRAVENOUS EVERY 24 HOURS
Status: DISCONTINUED | OUTPATIENT
Start: 2023-04-08 | End: 2023-04-08 | Stop reason: RX

## 2023-04-07 RX ORDER — IPRATROPIUM BROMIDE 21 UG/1
2 SPRAY, METERED NASAL 2 TIMES DAILY PRN
Qty: 1 EACH | Refills: 2 | Status: SHIPPED | OUTPATIENT
Start: 2023-04-07 | End: 2023-04-11 | Stop reason: SDUPTHER

## 2023-04-07 RX ORDER — VALSARTAN 40 MG/1
40 TABLET ORAL DAILY
Status: DISCONTINUED | OUTPATIENT
Start: 2023-04-07 | End: 2023-04-10 | Stop reason: HOSPADM

## 2023-04-07 RX ORDER — DEXAMETHASONE 4 MG/1
6 TABLET ORAL DAILY
Status: DISCONTINUED | OUTPATIENT
Start: 2023-04-07 | End: 2023-04-07 | Stop reason: HOSPADM

## 2023-04-07 RX ORDER — ACETAMINOPHEN 650 MG/1
650 SUPPOSITORY RECTAL EVERY 6 HOURS PRN
Status: DISCONTINUED | OUTPATIENT
Start: 2023-04-07 | End: 2023-04-10 | Stop reason: HOSPADM

## 2023-04-07 RX ADMIN — PANTOPRAZOLE SODIUM 40 MG: 40 TABLET, DELAYED RELEASE ORAL at 06:40

## 2023-04-07 RX ADMIN — TAMSULOSIN HYDROCHLORIDE 0.4 MG: 0.4 CAPSULE ORAL at 08:28

## 2023-04-07 RX ADMIN — ALLOPURINOL 300 MG: 300 TABLET ORAL at 08:28

## 2023-04-07 RX ADMIN — TAMSULOSIN HYDROCHLORIDE 0.4 MG: 0.4 CAPSULE ORAL at 16:57

## 2023-04-07 RX ADMIN — ONDANSETRON 4 MG: 4 TABLET, ORALLY DISINTEGRATING ORAL at 03:14

## 2023-04-07 RX ADMIN — VALSARTAN 40 MG: 40 TABLET, FILM COATED ORAL at 16:58

## 2023-04-07 RX ADMIN — ACETAMINOPHEN 650 MG: 325 TABLET ORAL at 01:14

## 2023-04-07 RX ADMIN — ACETAMINOPHEN 650 MG: 325 TABLET ORAL at 21:33

## 2023-04-07 RX ADMIN — FINASTERIDE 5 MG: 5 TABLET, FILM COATED ORAL at 16:58

## 2023-04-07 RX ADMIN — LEVOTHYROXINE SODIUM 125 MCG: 125 TABLET ORAL at 06:39

## 2023-04-07 RX ADMIN — PANTOPRAZOLE SODIUM 40 MG: 40 TABLET, DELAYED RELEASE ORAL at 16:59

## 2023-04-07 RX ADMIN — REMDESIVIR 100 MG: 100 INJECTION, POWDER, LYOPHILIZED, FOR SOLUTION INTRAVENOUS at 10:41

## 2023-04-07 RX ADMIN — FINASTERIDE 5 MG: 5 TABLET, FILM COATED ORAL at 08:28

## 2023-04-07 RX ADMIN — VALSARTAN 40 MG: 80 TABLET, FILM COATED ORAL at 08:28

## 2023-04-07 RX ADMIN — DEXAMETHASONE 6 MG: 4 TABLET ORAL at 11:28

## 2023-04-07 RX ADMIN — ACETAMINOPHEN 650 MG: 325 TABLET ORAL at 07:40

## 2023-04-07 RX ADMIN — SODIUM CHLORIDE, PRESERVATIVE FREE 10 ML: 5 INJECTION INTRAVENOUS at 08:28

## 2023-04-07 RX ADMIN — DOCUSATE SODIUM 100 MG: 100 CAPSULE ORAL at 16:59

## 2023-04-07 RX ADMIN — ATORVASTATIN CALCIUM 10 MG: 10 TABLET, FILM COATED ORAL at 08:28

## 2023-04-07 ASSESSMENT — PAIN DESCRIPTION - ORIENTATION
ORIENTATION: LEFT;RIGHT
ORIENTATION: LOWER
ORIENTATION: MID
ORIENTATION: MID

## 2023-04-07 ASSESSMENT — PAIN SCALES - GENERAL
PAINLEVEL_OUTOF10: 3
PAINLEVEL_OUTOF10: 6
PAINLEVEL_OUTOF10: 5
PAINLEVEL_OUTOF10: 6
PAINLEVEL_OUTOF10: 4
PAINLEVEL_OUTOF10: 5
PAINLEVEL_OUTOF10: 6
PAINLEVEL_OUTOF10: 6

## 2023-04-07 ASSESSMENT — PAIN DESCRIPTION - FREQUENCY
FREQUENCY: CONTINUOUS
FREQUENCY: CONTINUOUS

## 2023-04-07 ASSESSMENT — ENCOUNTER SYMPTOMS
DIARRHEA: 0
COUGH: 0
SHORTNESS OF BREATH: 0
VOMITING: 0
WHEEZING: 0
CHOKING: 0
NAUSEA: 0
ABDOMINAL DISTENTION: 0
CONSTIPATION: 0
ABDOMINAL PAIN: 0
CHEST TIGHTNESS: 0

## 2023-04-07 ASSESSMENT — PAIN DESCRIPTION - DESCRIPTORS
DESCRIPTORS: ACHING

## 2023-04-07 ASSESSMENT — PAIN - FUNCTIONAL ASSESSMENT
PAIN_FUNCTIONAL_ASSESSMENT: ACTIVITIES ARE NOT PREVENTED

## 2023-04-07 ASSESSMENT — PAIN DESCRIPTION - LOCATION
LOCATION: HEAD;BACK
LOCATION: NECK
LOCATION: HEAD
LOCATION: HEAD

## 2023-04-07 ASSESSMENT — PAIN DESCRIPTION - ONSET
ONSET: ON-GOING
ONSET: ON-GOING

## 2023-04-07 ASSESSMENT — PAIN DESCRIPTION - PAIN TYPE
TYPE: ACUTE PAIN
TYPE: ACUTE PAIN;CHRONIC PAIN

## 2023-04-07 NOTE — DISCHARGE SUMMARY
COMPARISON: November 24, 2019 HISTORY: ORDERING SYSTEM PROVIDED HISTORY: Gastroesophageal reflux disease without esophagitis TECHNOLOGIST PROVIDED HISTORY: STAT Creatinine as needed:->No FINDINGS: Lower Chest: No active disease. Organs: The liver appears unremarkable. Mild cholelithiasis but no acute cholecystitis. Pancreas, spleen, adrenals, kidneys, aorta and IVC appear stable. Bilateral renal cysts but no solid masses or stones. GI/Bowel: No evidence of bowel obstruction or perforation. Normal appendix. Minimal diverticulosis but no acute diverticulitis. Small bowel appears unremarkable. Pelvis: Urinary bladder, prostate and pelvis demonstrate no acute abnormality. No evidence of significant lymphadenopathy. Peritoneum/Retroperitoneum: No evidence of retroperitoneal lymphadenopathy or acute mesenteric findings. Scattered retroperitoneal and mesenteric nodes which are not enlarged and likely reactive. Mild fat containing periumbilical hernia. Bones/Soft Tissues: No acute abnormality. Chronic L1 mild compression injury appears stable. 1. Mild cholelithiasis but no acute cholecystitis. 2. No evidence of hiatal hernia. No acute gastrointestinal abnormality. Normal appendix. Mild diverticulosis but no acute diverticulitis. 3. No evidence of renal stones or obstructive uropathy. Bilateral renal cysts appear benign. XR CHEST PORTABLE    Result Date: 4/5/2023  EXAMINATION: ONE XRAY VIEW OF THE CHEST 4/5/2023 8:25 am COMPARISON: 11/25/2019 HISTORY: ORDERING SYSTEM PROVIDED HISTORY: fever TECHNOLOGIST PROVIDED HISTORY: fever FINDINGS: Stable pacing device left chest.  Normal heart size and pulmonary vasculature. No focal consolidations, pleural effusions, or pneumothorax. Minimal left basilar atelectasis. Minimal left basilar atelectasis.      NM HEPATOBILIARY SCAN W EJECTION FRACTION    Result Date: 4/6/2023  EXAMINATION: NUCLEAR MEDICINE HEPATOBILIARY SCINTIGRAPHY (HIDA SCAN) WITH EJECTION

## 2023-04-07 NOTE — PLAN OF CARE
Problem: Discharge Planning  Goal: Discharge to home or other facility with appropriate resources  Outcome: Progressing  Flowsheets (Taken 4/7/2023 0012)  Discharge to home or other facility with appropriate resources: Identify barriers to discharge with patient and caregiver     Problem: Pain  Goal: Verbalizes/displays adequate comfort level or baseline comfort level  Outcome: Progressing  Flowsheets (Taken 4/7/2023 0012)  Verbalizes/displays adequate comfort level or baseline comfort level:   Encourage patient to monitor pain and request assistance   Administer analgesics based on type and severity of pain and evaluate response   Assess pain using appropriate pain scale   Implement non-pharmacological measures as appropriate and evaluate response     Problem: Safety - Adult  Goal: Free from fall injury  Outcome: Progressing  Flowsheets (Taken 4/7/2023 0012)  Free From Fall Injury: Instruct family/caregiver on patient safety     Problem: Nutrition Deficit:  Goal: Optimize nutritional status  Outcome: Progressing     Problem: Chronic Conditions and Co-morbidities  Goal: Patient's chronic conditions and co-morbidity symptoms are monitored and maintained or improved  Outcome: Progressing  Flowsheets (Taken 4/7/2023 0012)  Care Plan - Patient's Chronic Conditions and Co-Morbidity Symptoms are Monitored and Maintained or Improved:   Monitor and assess patient's chronic conditions and comorbid symptoms for stability, deterioration, or improvement   Collaborate with multidisciplinary team to address chronic and comorbid conditions and prevent exacerbation or deterioration

## 2023-04-07 NOTE — PROGRESS NOTES
[address]  Inpatient/Observation/Outpatient Rehabilitation    Date: 2023  Patient Name: Xin Warren       [x] Inpatient Acute/Observation       []  Outpatient  : 1950       [] Pt no showed for scheduled appointment    [] Pt refused/declined therapy at this time due to:           [x] Pt cancelled due to:  [] No Reason Given   [] Sick/ill   [x] Other:  OT eval attempted. Nurse reports that she has not assessed him yet to hold OT evaluation and check back at a later time    Therapist/Assistant will attempt to see this patient, at our earliest opportunity.        Denzel Gill, OT Date: 2023
Comprehensive Nutrition Assessment    Type and Reason for Visit:  Initial    Nutrition Recommendations/Plan:   Continue current diet. Recommend addition of vitamin  C, vitamin D, and zinc to aid recovery from [place]. Malnutrition Assessment:  Malnutrition Status: At risk for malnutrition (Comment) (04/06/23 4510)    Context:  Acute Illness     Findings of the 6 clinical characteristics of malnutrition:  Energy Intake:  Mild decrease in energy intake (Comment)  Weight Loss:  No significant weight loss     Body Fat Loss:  Unable to assess     Muscle Mass Loss:  Unable to assess    Fluid Accumulation:  Mild Extremities (trace BLE)   Strength:  Not Performed    Nutrition Assessment:    Inadequate oral intakes r/t impaired respiratory function aeb poor PO before admission. Pt admitted with COVID-19. Recommend addition of vitamin C, vitamin D, and zinc to aid recovery from COVID-19. Pt is at testing at this time. Nutrition Related Findings:    unable to assess Wound Type: None       Current Nutrition Intake & Therapies:    Average Meal Intake: %  Average Supplements Intake: None Ordered  ADULT DIET; Regular; 4 carb choices (60 gm/meal)    Anthropometric Measures:  Height: 5' 4\" (162.6 cm)  Ideal Body Weight (IBW): 130 lbs (59 kg)    Admission Body Weight: 178 lb 9.2 oz (81 kg)  Current Body Weight: 176 lb (79.8 kg), 135.4 % IBW. Weight Source: Bed Scale  Current BMI (kg/m2): 30.2  Usual Body Weight: 176 lb (79.8 kg)  % Weight Change (Calculated): 0  Weight Adjustment For: No Adjustment                 BMI Categories: Obese Class 1 (BMI 30.0-34. 9)    Estimated Daily Nutrient Needs:  Energy Requirements Based On: Kcal/kg  Weight Used for Energy Requirements: Current  Energy (kcal/day): 8584-6873 (18-21/kg)  Weight Used for Protein Requirements: Ideal  Protein (g/day): 77-89g (1.3-1.5g/kg)  Method Used for Fluid Requirements: 1 ml/kcal  Fluid (ml/day): 1700 ml  Recent Labs     04/04/23  0952
[facility]  Inpatient/Observation/Outpatient Rehabilitation    Date: 2023  Patient Name: Real Handley       [x] Inpatient Acute/Observation       []  Outpatient  : 1950       [] Pt no showed for scheduled appointment    [] Pt refused/declined therapy at this time due to:           [] Pt cancelled due to:  [] No Reason Given   [] Sick/ill   [x] Other: Patient demonstrating independence with mobility in room. Patient and nursing reporting that patient gets up independently and takes himself to the bathroom as well. Patient not interested in therapy services at this time and reports \"I think I am doing fine I don't really need it. \"     Therapist/Assistant will attempt to see this patient, at our earliest opportunity.        Andrew Francois, OT Date: 2023
I notified Dr. Vikki Gomez that I was not able to communicate the oncology consult with a doctor through the cancer center and he said he will contact the doctor.
INITIATION OF PAXLOVID THERAPY     Latest Reference Range & Units 04/06/23 05:50     Est, Glom Filt Rate >60 mL/min/1.73m2 >60   ALT 5 - 41 U/L 23   AST <40 U/L 23     Paxlovid initiated on 4/6/23. Last dose 4/10/23. Eliquis dose reduced by 50% while on paxlovid. Can be increased back to 5 mg BID on 4/14/23. Lipitor held while on paxlovid. Can resume on 4/16/23. Monitor blood pressure due to flomax/paxlovid interaction. Monitor blood pressure - potential hypotension with valsartan/paxlovid.     Thank you,  Ha Doshi, PharmD, 4/6/2023 8:24 AM
[facility] contacted for transfer per request of Dr. Van Cummings. They will call back when a bed is available.
[facility]  Inpatient/Observation/Outpatient Rehabilitation    Date: 2023  Patient Name: Enriqueta Crawford       [x] Inpatient Acute/Observation       []  Outpatient  : 1950       [] Pt no showed for scheduled appointment    [x] Pt refused/declined therapy at this time due to:      Pt in chair upon therapist arrival. Pt reporting independence in all functional mobility and tasks. Pt able to demonstrate safety with transfers, ambulation, and functional mobility independently. Pt denied therapy needs at this time. Pt to be discharged from skilled acute physical therapy services. Pt educated to inform nursing and physician if needs change. Pt agreeable. [] Pt cancelled due to:  [] No Reason Given   [] Sick/ill   [] Other:    Therapist/Assistant will attempt to see this patient, at our earliest opportunity.        Tegan Madera, PT, DPT Date: 2023
Patient ambulated in deshpande at this time; patient tolerated well. PRN zofran given up return to room.
Patient called out stating that he wanted this IV fluids lowered because he was getting up to pee all the time. RN lowered fluids to 0400 setting of 100ml/hr.
Physician Progress Note      PATIENTEric China  CSN #:                  [id number]  :                       1950  ADMIT DATE:       2023 3:02 PM  [address] DATE:  RESPONDING  PROVIDER #:        Anibal Elizondo MD          QUERY TEXT:    Patient admitted with dizziness. Noted documentation of Acute Kidney Injury   in H/P from . In order to support the diagnosis of JOEL, please include   additional clinical indicators in your documentation. ? Or please document if   the diagnosis of JOEL has been ruled out after further study. The medical record reflects the following:  Risk Factors:  Clinical Indicators: Creat 1.22-->0.89; Treatment: IVF @ 150ml/hr then dropped to 100ml/hr, I/O Q8H, CMP daily    Defined by Kidney Disease Improving Global Outcomes (KDIGO) clinical practice   guideline for acute kidney injury:  -Increase in SCr by greater than or equal to 0.3 mg/dl within 48 hours; or  -Increase or decrease in SCr to greater than or equal to 1.5 times baseline,   which is known or presumed to have occurred within the prior 7 days; or  -Urine volume < 0.5ml/kg/h for 6 hours. Options provided:  -- Acute kidney injury evidenced by, Please document evidence as well as a   numerical baseline creatinine, if known. -- Acute kidney injury ruled out after study  -- Other - I will add my own diagnosis  -- Disagree - Not applicable / Not valid  -- Disagree - Clinically unable to determine / Unknown  -- Refer to Clinical Documentation Reviewer    PROVIDER RESPONSE TEXT:    Acute kidney injury was ruled out after study. Query created by: Weston Valenzuela on 2023 10:13 AM      QUERY TEXT:    Patient was admitted with JOEL, noted to have Paroxysmal atrial fibrillation   with EARL?DS?-VASc Score 3 (age 65M, HTN, CHF) and is maintained on Eliquis. If   possible, please document in progress notes and discharge summary if you are   evaluating and/or treating any of the following:     The
Progress Note  Valerie Bronson MD  Patient: Tejal Boland  Date of Admission: 4/5/2023  [facility]  Date of Evaluation: 4/6/2023      SUBJECTIVE:    Mr. Ebony Mo  was seen and examined today for f/u of COVID-19 virus infection. He  has bodyaches and chills . He has some nausea. He  has no edema or shortness of breath . He  has no bruising or bleeding . ROS:   Constitutional: negative  for fevers, and positive for chills. Respiratory: negative for shortness of breath, positive for cough, and negative for wheezing  Cardiovascular: negative for chest pain, and negative for palpitations  Gastrointestinal: negative for abdominal pain, positive for nausea,negative for vomiting, negative for diarrhea, and negative for constipation     All other systems were reviewed with the patient and are negative unless otherwise stated in HPI    -----------------------------------------------------------------  OBJECTIVE:  Vitals:   Temp: 98 °F (36.7 °C)  BP: 129/73  Resp: 18  Heart Rate: 83  SpO2: 97 % on room air    Weight  Wt Readings from Last 3 Encounters:   04/06/23 176 lb (79.8 kg)   04/04/23 175 lb (79.4 kg)   04/04/23 175 lb 6.4 oz (79.6 kg)     Body mass index is 30.21 kg/m². 24HR INTAKE/OUTPUT:      Intake/Output Summary (Last 24 hours) at 4/6/2023 1032  Last data filed at 4/6/2023 0523  Gross per 24 hour   Intake 954.02 ml   Output 1575 ml   Net -620.98 ml       Exam:  GEN:    Awake, alert and oriented x 3. EYES:  EOMI, pupils equal   NECK: Supple. No lymphadenopathy. No carotid bruit  CVS:    regular rate and rhythm, no audible murmur  PULM:  CTA, no wheezes, rales or rhonchi, no acute respiratory distress  ABD:    Bowels sounds normal.  Abdomen is soft. No distention. no tenderness to palpation. EXT:   trace edema bilaterally . No calf tenderness. NEURO: Moves all extremities. Motor and sensory are grossly intact  SKIN:  No rashes. No skin lesions.
Progress Note  Valerie Bronson MD  Patient: Victorino Gardner  Date of Admission: 4/5/2023  3:02 [facility] 102 Day # 2  Date of Evaluation: 4/7/2023      SUBJECTIVE:    Mr. Reed Amanda  was seen and examined today for f/u of COVID-19 virus infection. He  has bodyaches and chills . He has some nausea, headache. He  has no edema or shortness of breath . He  has no bruising or bleeding . Markers of inflammation high  Platelets and wbc lower. ROS:   Constitutional: negative  for fevers, and positive for chills. Has headache and bodyache  Respiratory: negative for shortness of breath, positive for cough, and negative for wheezing  Cardiovascular: negative for chest pain, and negative for palpitations  Gastrointestinal: negative for abdominal pain, positive for nausea,negative for vomiting, negative for diarrhea, and negative for constipation  Skin- no bruising   All other systems were reviewed with the patient and are negative unless otherwise stated in HPI    -----------------------------------------------------------------  OBJECTIVE:  Vitals:   Temp: 99.2 °F (37.3 °C)  BP: (!) 157/96  Resp: 20  Heart Rate: 81  SpO2: 99 % on room air    Weight  Wt Readings from Last 3 Encounters:   04/07/23 175 lb 6.9 oz (79.6 kg)   04/04/23 175 lb (79.4 kg)   04/04/23 175 lb 6.4 oz (79.6 kg)     Body mass index is 30.11 kg/m². 24HR INTAKE/OUTPUT:      Intake/Output Summary (Last 24 hours) at 4/7/2023 0750  Last data filed at 4/7/2023 0646  Gross per 24 hour   Intake 3085.23 ml   Output 1500 ml   Net 1585.23 ml       Exam:  GEN:    Awake, alert and oriented x 3. EYES:  EOMI, pupils equal   NECK: Supple. No lymphadenopathy. No carotid bruit  CVS:    regular rate and rhythm, no audible murmur  PULM:  CTA, no wheezes, rales or rhonchi, no acute respiratory distress  ABD:    Bowels sounds normal.  Abdomen is soft. No distention. no tenderness to palpation. EXT:   trace edema bilaterally . No calf tenderness.    NEURO:
Pt arrived from registration to [facility] room 320 at this time. Pt ambulated independently to bed at this time. Pt changed into pt gown and weighed at this time, Wife at bedside. Kelvin Cruz CNP at bedside.
Pt made NPO at this time
Pt tried cpap of 8 for approx 5minutes. Pt felt like it was too uncomfortable and refused to continue with cpap. Pt states he will be fine without it for the night. RN informed.
Pt vitals and assessment completed, see flowsheet. Pt A&Ox4, breathing normal. Pt c/o 6/10 headache as well as general body aches and chronic lower back pain. Pt denies any further needs at this time, call light within reach, will continue to monitor.
Report has been called to charge nurse at [facility]. [facility] ER.
Vitals and Assessment completed at this time. See Flowsheet for details. Pt is currently resting in bed with wife at bedside. Pt alert and oriented x4. Pt reports a headache rated a 3/10 at this time. Pt declines any medication or other interventions. Pt denies shortness of breath. Pt denies any further needs at this time. Call light and personal belongings in reach, care ongoing.
Vitals and reassessment completed at this time. See flowsheet for details. Pt is sleeping comfortably in bed. Wife is in room.  Denies any further needs, call light in reach, care ongoing
Writer at bedside at this time to perform initial shift assessment. Patient is sitting up in bed at this time with wife by his side. Vital signs taken and assessment completed at this time. Patient is alert and oriented and has complaints of headache pain 5/10 and nausea; PRN Tylenol and Zofran given at this time. Patient denies any further needs at this time. Call light within reach, care ongoing.
Writer at bedside at this time to perform second shift assessment. Patient is lying in bed at this time with eyes closed and home cpap machine on. Vital signs taken and assessment completed at this time. Patient is alert and oriented and has complaint of a headache 5/10; patient repositioned for comfort and informed of next time he can receive pain medication. Patient denies any further needs at this time. Call light within reach, care ongoing.
Writer at bedside to complete admission assessment and vital signs. Pt A&O x4. Pt complaining of mild nausea but denies any PRN medication. Lung sounds clear throughout. Pt denies any SOB, chest pain or lightheadedness. Pt complaining of mild headache. Pt's Wife stated \"He took tylenol last at 2:30pm.\" Bilateral lower extremities have trace, non-pitting edema. Pt has occasional dry cough. Pt updated on plan of care and pt informed pt that he will be NPO at midnight for morning test per Le Gary, supervisor. Whiteboard updated and pt oriented to room. Pt denies any further needs at this time. Call light within reach, care ongoing.
Writer attempted to put cardiac monitor on pt and pt refused at this time. Pt educated on importance of having the cardiac monitor and pt understood but still refusing at this time.
appear benign. Medical Decision [id number]:       Medical Decision Making-Other:     Note:  Labs, medications, radiologic studies were reviewed with personal review of films  Large amounts of data were reviewed  Discussed with nursing Staff, Discharge planner  Infection Control and Prevention measures reviewed  All prior entries were reviewed  Administer medications as ordered  Prognosis: Guarded  Discharge planning reviewed  Follow up as outpatient. Thank you for allowing us to participate in the care of this patient. Please call with questions.     John Parker MD  Pager: [phone] - Office: [phone]

## 2023-04-08 ENCOUNTER — APPOINTMENT (OUTPATIENT)
Dept: CT IMAGING | Age: 73
DRG: 177 | End: 2023-04-08
Attending: INTERNAL MEDICINE
Payer: MEDICARE

## 2023-04-08 PROBLEM — R79.82 CRP ELEVATED: Status: ACTIVE | Noted: 2023-04-08

## 2023-04-08 PROBLEM — D72.810 LYMPHOCYTOPENIA: Status: ACTIVE | Noted: 2023-04-08

## 2023-04-08 LAB
ABSOLUTE EOS #: 0 K/UL (ref 0–0.4)
ABSOLUTE IMMATURE GRANULOCYTE: 0.02 K/UL (ref 0–0.3)
ABSOLUTE LYMPH #: 0.68 K/UL (ref 1–4.8)
ABSOLUTE MONO #: 0.36 K/UL (ref 0.1–0.8)
ALBUMIN SERPL-MCNC: 3.3 G/DL (ref 3.5–5.2)
ALBUMIN/GLOBULIN RATIO: 1.1 (ref 1–2.5)
ALP SERPL-CCNC: 63 U/L (ref 40–129)
ALT SERPL-CCNC: 26 U/L (ref 5–41)
ANION GAP SERPL CALCULATED.3IONS-SCNC: 9 MMOL/L (ref 9–17)
AST SERPL-CCNC: 35 U/L
BASOPHILS # BLD: 0 % (ref 0–2)
BASOPHILS ABSOLUTE: 0 K/UL (ref 0–0.2)
BILIRUB SERPL-MCNC: 0.7 MG/DL (ref 0.3–1.2)
BUN SERPL-MCNC: 24 MG/DL (ref 8–23)
CALCIUM SERPL-MCNC: 9 MG/DL (ref 8.6–10.4)
CHLORIDE SERPL-SCNC: 102 MMOL/L (ref 98–107)
CO2 SERPL-SCNC: 23 MMOL/L (ref 20–31)
CREAT SERPL-MCNC: 1.02 MG/DL (ref 0.7–1.2)
CRP SERPL HS-MCNC: 89.6 MG/L (ref 0–5)
EOSINOPHILS RELATIVE PERCENT: 0 % (ref 1–4)
FIBRINOGEN: 462 MG/DL (ref 203–521)
GFR SERPL CREATININE-BSD FRML MDRD: >60 ML/MIN/1.73M2
GLUCOSE BLD-MCNC: 126 MG/DL (ref 75–110)
GLUCOSE BLD-MCNC: 150 MG/DL (ref 75–110)
GLUCOSE BLD-MCNC: 153 MG/DL (ref 75–110)
GLUCOSE BLD-MCNC: 161 MG/DL (ref 75–110)
GLUCOSE BLD-MCNC: 188 MG/DL (ref 75–110)
GLUCOSE SERPL-MCNC: 180 MG/DL (ref 70–99)
HAPTOGLOB SERPL-MCNC: 106 MG/DL (ref 30–200)
HCT VFR BLD AUTO: 37.2 % (ref 40.7–50.3)
HGB BLD-MCNC: 13.4 G/DL (ref 13–17)
IMMATURE GRANULOCYTES: 1 %
LDLC SERPL-MCNC: 313 U/L (ref 135–225)
LYMPHOCYTES # BLD: 38 % (ref 24–44)
MCH RBC QN AUTO: 31.2 PG (ref 25.2–33.5)
MCHC RBC AUTO-ENTMCNC: 36 G/DL (ref 28.4–34.8)
MCV RBC AUTO: 86.7 FL (ref 82.6–102.9)
MONOCYTES # BLD: 20 % (ref 1–7)
MORPHOLOGY: ABNORMAL
NRBC AUTOMATED: 0 PER 100 WBC
PDW BLD-RTO: 13.2 % (ref 11.8–14.4)
PLATELET # BLD AUTO: ABNORMAL K/UL (ref 138–453)
PLATELET, FLUORESCENCE: 39 K/UL (ref 138–453)
PLATELET, IMMATURE FRACTION: 3.2 % (ref 1.1–10.3)
POTASSIUM SERPL-SCNC: 4.4 MMOL/L (ref 3.7–5.3)
PROCALCITONIN SERPL-MCNC: 0.75 NG/ML
PROT SERPL-MCNC: 6.2 G/DL (ref 6.4–8.3)
RBC # BLD: 4.29 M/UL (ref 4.21–5.77)
REASON FOR REJECTION: NORMAL
SEG NEUTROPHILS: 41 % (ref 36–66)
SEGMENTED NEUTROPHILS ABSOLUTE COUNT: 0.74 K/UL (ref 1.8–7.7)
SODIUM SERPL-SCNC: 134 MMOL/L (ref 135–144)
WBC # BLD AUTO: 1.8 K/UL (ref 3.5–11.3)
ZZ NTE CLEAN UP: ORDERED TEST: NORMAL
ZZ NTE WITH NAME CLEAN UP: SPECIMEN SOURCE: NORMAL

## 2023-04-08 PROCEDURE — 85025 COMPLETE CBC W/AUTO DIFF WBC: CPT

## 2023-04-08 PROCEDURE — 82728 ASSAY OF FERRITIN: CPT

## 2023-04-08 PROCEDURE — 86140 C-REACTIVE PROTEIN: CPT

## 2023-04-08 PROCEDURE — 87899 AGENT NOS ASSAY W/OPTIC: CPT

## 2023-04-08 PROCEDURE — 2580000003 HC RX 258: Performed by: INTERNAL MEDICINE

## 2023-04-08 PROCEDURE — 6360000002 HC RX W HCPCS: Performed by: STUDENT IN AN ORGANIZED HEALTH CARE EDUCATION/TRAINING PROGRAM

## 2023-04-08 PROCEDURE — 6370000000 HC RX 637 (ALT 250 FOR IP): Performed by: INTERNAL MEDICINE

## 2023-04-08 PROCEDURE — 99232 SBSQ HOSP IP/OBS MODERATE 35: CPT | Performed by: STUDENT IN AN ORGANIZED HEALTH CARE EDUCATION/TRAINING PROGRAM

## 2023-04-08 PROCEDURE — 87040 BLOOD CULTURE FOR BACTERIA: CPT

## 2023-04-08 PROCEDURE — 85384 FIBRINOGEN ACTIVITY: CPT

## 2023-04-08 PROCEDURE — 99232 SBSQ HOSP IP/OBS MODERATE 35: CPT | Performed by: INTERNAL MEDICINE

## 2023-04-08 PROCEDURE — 71250 CT THORAX DX C-: CPT

## 2023-04-08 PROCEDURE — 83615 LACTATE (LD) (LDH) ENZYME: CPT

## 2023-04-08 PROCEDURE — 84145 PROCALCITONIN (PCT): CPT

## 2023-04-08 PROCEDURE — 80053 COMPREHEN METABOLIC PANEL: CPT

## 2023-04-08 PROCEDURE — 82947 ASSAY GLUCOSE BLOOD QUANT: CPT

## 2023-04-08 PROCEDURE — 83010 ASSAY OF HAPTOGLOBIN QUANT: CPT

## 2023-04-08 PROCEDURE — 6370000000 HC RX 637 (ALT 250 FOR IP): Performed by: STUDENT IN AN ORGANIZED HEALTH CARE EDUCATION/TRAINING PROGRAM

## 2023-04-08 PROCEDURE — 6360000002 HC RX W HCPCS: Performed by: INTERNAL MEDICINE

## 2023-04-08 PROCEDURE — 85055 RETICULATED PLATELET ASSAY: CPT

## 2023-04-08 PROCEDURE — 36415 COLL VENOUS BLD VENIPUNCTURE: CPT

## 2023-04-08 PROCEDURE — 2060000000 HC ICU INTERMEDIATE R&B

## 2023-04-08 RX ORDER — DOXYCYCLINE HYCLATE 100 MG
100 TABLET ORAL EVERY 12 HOURS SCHEDULED
Status: DISCONTINUED | OUTPATIENT
Start: 2023-04-08 | End: 2023-04-09

## 2023-04-08 RX ORDER — ECHINACEA PURPUREA EXTRACT 125 MG
1 TABLET ORAL PRN
Status: DISCONTINUED | OUTPATIENT
Start: 2023-04-08 | End: 2023-04-10 | Stop reason: HOSPADM

## 2023-04-08 RX ORDER — ALBUTEROL SULFATE 90 UG/1
2 AEROSOL, METERED RESPIRATORY (INHALATION) EVERY 6 HOURS PRN
Status: DISCONTINUED | OUTPATIENT
Start: 2023-04-08 | End: 2023-04-10 | Stop reason: HOSPADM

## 2023-04-08 RX ORDER — DEXAMETHASONE SODIUM PHOSPHATE 10 MG/ML
6 INJECTION INTRAMUSCULAR; INTRAVENOUS EVERY 24 HOURS
Status: DISCONTINUED | OUTPATIENT
Start: 2023-04-08 | End: 2023-04-08

## 2023-04-08 RX ADMIN — DOCUSATE SODIUM 100 MG: 100 CAPSULE ORAL at 11:09

## 2023-04-08 RX ADMIN — REMDESIVIR 100 MG: 100 INJECTION, POWDER, LYOPHILIZED, FOR SOLUTION INTRAVENOUS at 12:35

## 2023-04-08 RX ADMIN — SODIUM CHLORIDE, PRESERVATIVE FREE 10 ML: 5 INJECTION INTRAVENOUS at 20:44

## 2023-04-08 RX ADMIN — DOXYCYCLINE HYCLATE 100 MG: 100 TABLET, COATED ORAL at 23:07

## 2023-04-08 RX ADMIN — FINASTERIDE 5 MG: 5 TABLET, FILM COATED ORAL at 08:25

## 2023-04-08 RX ADMIN — PANTOPRAZOLE SODIUM 40 MG: 40 TABLET, DELAYED RELEASE ORAL at 06:25

## 2023-04-08 RX ADMIN — SODIUM CHLORIDE, PRESERVATIVE FREE 10 ML: 5 INJECTION INTRAVENOUS at 12:36

## 2023-04-08 RX ADMIN — ALLOPURINOL 300 MG: 300 TABLET ORAL at 11:09

## 2023-04-08 RX ADMIN — DEXAMETHASONE 6 MG: 4 TABLET ORAL at 15:24

## 2023-04-08 RX ADMIN — VALSARTAN 40 MG: 40 TABLET, FILM COATED ORAL at 08:25

## 2023-04-08 RX ADMIN — LEVOTHYROXINE SODIUM 125 MCG: 125 TABLET ORAL at 06:25

## 2023-04-08 RX ADMIN — TAMSULOSIN HYDROCHLORIDE 0.4 MG: 0.4 CAPSULE ORAL at 08:24

## 2023-04-08 RX ADMIN — PANTOPRAZOLE SODIUM 40 MG: 40 TABLET, DELAYED RELEASE ORAL at 16:43

## 2023-04-08 ASSESSMENT — ENCOUNTER SYMPTOMS
CONSTIPATION: 0
COLOR CHANGE: 0
ABDOMINAL DISTENTION: 0
CHEST TIGHTNESS: 0
SHORTNESS OF BREATH: 0
APNEA: 0
RHINORRHEA: 1
EYE REDNESS: 0
EYE PAIN: 0

## 2023-04-09 LAB
ABSOLUTE EOS #: 0 K/UL (ref 0–0.4)
ABSOLUTE IMMATURE GRANULOCYTE: 0 K/UL (ref 0–0.3)
ABSOLUTE LYMPH #: 1.5 K/UL (ref 1–4.8)
ABSOLUTE MONO #: 0.33 K/UL (ref 0.1–0.8)
ANION GAP SERPL CALCULATED.3IONS-SCNC: 9 MMOL/L (ref 9–17)
BASOPHILS # BLD: 0 % (ref 0–2)
BASOPHILS ABSOLUTE: 0 K/UL (ref 0–0.2)
BUN SERPL-MCNC: 27 MG/DL (ref 8–23)
CALCIUM SERPL-MCNC: 8.7 MG/DL (ref 8.6–10.4)
CHLORIDE SERPL-SCNC: 104 MMOL/L (ref 98–107)
CO2 SERPL-SCNC: 22 MMOL/L (ref 20–31)
CREAT SERPL-MCNC: 1.09 MG/DL (ref 0.7–1.2)
EOSINOPHILS RELATIVE PERCENT: 0 % (ref 1–4)
FERRITIN SERPL-MCNC: 539 NG/ML (ref 30–400)
GFR SERPL CREATININE-BSD FRML MDRD: >60 ML/MIN/1.73M2
GLUCOSE BLD-MCNC: 112 MG/DL (ref 75–110)
GLUCOSE BLD-MCNC: 125 MG/DL (ref 75–110)
GLUCOSE BLD-MCNC: 139 MG/DL (ref 75–110)
GLUCOSE BLD-MCNC: 151 MG/DL (ref 75–110)
GLUCOSE SERPL-MCNC: 134 MG/DL (ref 70–99)
HCT VFR BLD AUTO: 39.3 % (ref 40.7–50.3)
HGB BLD-MCNC: 13.4 G/DL (ref 13–17)
IMMATURE GRANULOCYTES: 0 %
LYMPHOCYTES # BLD: 50 % (ref 24–44)
MCH RBC QN AUTO: 30.4 PG (ref 25.2–33.5)
MCHC RBC AUTO-ENTMCNC: 34.1 G/DL (ref 28.4–34.8)
MCV RBC AUTO: 89.1 FL (ref 82.6–102.9)
MICROORGANISM SPEC CULT: NORMAL
MICROORGANISM SPEC CULT: NORMAL
MONOCYTES # BLD: 11 % (ref 1–7)
MORPHOLOGY: NORMAL
NRBC AUTOMATED: 0 PER 100 WBC
PDW BLD-RTO: 12.9 % (ref 11.8–14.4)
PLATELET # BLD AUTO: ABNORMAL K/UL (ref 138–453)
PLATELET, FLUORESCENCE: 31 K/UL (ref 138–453)
PLATELET, IMMATURE FRACTION: 9.3 % (ref 1.1–10.3)
POTASSIUM SERPL-SCNC: 4.3 MMOL/L (ref 3.7–5.3)
RBC # BLD: 4.41 M/UL (ref 4.21–5.77)
SEG NEUTROPHILS: 39 % (ref 36–66)
SEGMENTED NEUTROPHILS ABSOLUTE COUNT: 1.17 K/UL (ref 1.8–7.7)
SERVICE CMNT-IMP: NORMAL
SERVICE CMNT-IMP: NORMAL
SODIUM SERPL-SCNC: 135 MMOL/L (ref 135–144)
SOURCE: NORMAL
SPECIMEN DESCRIPTION: NORMAL
SPECIMEN DESCRIPTION: NORMAL
STREP PNEUMONIAE ANTIGEN: NEGATIVE
WBC # BLD AUTO: 3 K/UL (ref 3.5–11.3)

## 2023-04-09 PROCEDURE — 6370000000 HC RX 637 (ALT 250 FOR IP): Performed by: INTERNAL MEDICINE

## 2023-04-09 PROCEDURE — 36415 COLL VENOUS BLD VENIPUNCTURE: CPT

## 2023-04-09 PROCEDURE — 99232 SBSQ HOSP IP/OBS MODERATE 35: CPT | Performed by: STUDENT IN AN ORGANIZED HEALTH CARE EDUCATION/TRAINING PROGRAM

## 2023-04-09 PROCEDURE — 99232 SBSQ HOSP IP/OBS MODERATE 35: CPT | Performed by: INTERNAL MEDICINE

## 2023-04-09 PROCEDURE — 80048 BASIC METABOLIC PNL TOTAL CA: CPT

## 2023-04-09 PROCEDURE — 2580000003 HC RX 258: Performed by: INTERNAL MEDICINE

## 2023-04-09 PROCEDURE — 6360000002 HC RX W HCPCS: Performed by: INTERNAL MEDICINE

## 2023-04-09 PROCEDURE — 85025 COMPLETE CBC W/AUTO DIFF WBC: CPT

## 2023-04-09 PROCEDURE — 85055 RETICULATED PLATELET ASSAY: CPT

## 2023-04-09 PROCEDURE — 86738 MYCOPLASMA ANTIBODY: CPT

## 2023-04-09 PROCEDURE — 6370000000 HC RX 637 (ALT 250 FOR IP): Performed by: STUDENT IN AN ORGANIZED HEALTH CARE EDUCATION/TRAINING PROGRAM

## 2023-04-09 PROCEDURE — 2060000000 HC ICU INTERMEDIATE R&B

## 2023-04-09 PROCEDURE — 6360000002 HC RX W HCPCS: Performed by: STUDENT IN AN ORGANIZED HEALTH CARE EDUCATION/TRAINING PROGRAM

## 2023-04-09 PROCEDURE — 82947 ASSAY GLUCOSE BLOOD QUANT: CPT

## 2023-04-09 RX ORDER — CALCIUM CARBONATE 200(500)MG
500 TABLET,CHEWABLE ORAL 3 TIMES DAILY PRN
Status: DISCONTINUED | OUTPATIENT
Start: 2023-04-09 | End: 2023-04-10 | Stop reason: HOSPADM

## 2023-04-09 RX ORDER — DOXYCYCLINE HYCLATE 100 MG
100 TABLET ORAL EVERY 12 HOURS SCHEDULED
Status: DISCONTINUED | OUTPATIENT
Start: 2023-04-09 | End: 2023-04-09

## 2023-04-09 RX ORDER — FUROSEMIDE 10 MG/ML
20 INJECTION INTRAMUSCULAR; INTRAVENOUS ONCE
Status: COMPLETED | OUTPATIENT
Start: 2023-04-09 | End: 2023-04-09

## 2023-04-09 RX ADMIN — DOCUSATE SODIUM 100 MG: 100 CAPSULE ORAL at 08:41

## 2023-04-09 RX ADMIN — ACETAMINOPHEN 650 MG: 325 TABLET ORAL at 03:17

## 2023-04-09 RX ADMIN — SODIUM CHLORIDE, PRESERVATIVE FREE 10 ML: 5 INJECTION INTRAVENOUS at 08:42

## 2023-04-09 RX ADMIN — DOXYCYCLINE HYCLATE 100 MG: 100 TABLET, COATED ORAL at 08:41

## 2023-04-09 RX ADMIN — PANTOPRAZOLE SODIUM 40 MG: 40 TABLET, DELAYED RELEASE ORAL at 17:41

## 2023-04-09 RX ADMIN — LEVOTHYROXINE SODIUM 125 MCG: 125 TABLET ORAL at 06:21

## 2023-04-09 RX ADMIN — PANTOPRAZOLE SODIUM 40 MG: 40 TABLET, DELAYED RELEASE ORAL at 06:21

## 2023-04-09 RX ADMIN — FINASTERIDE 5 MG: 5 TABLET, FILM COATED ORAL at 08:42

## 2023-04-09 RX ADMIN — DEXAMETHASONE 6 MG: 2 TABLET ORAL at 13:45

## 2023-04-09 RX ADMIN — TAMSULOSIN HYDROCHLORIDE 0.4 MG: 0.4 CAPSULE ORAL at 08:41

## 2023-04-09 RX ADMIN — REMDESIVIR 100 MG: 100 INJECTION, POWDER, LYOPHILIZED, FOR SOLUTION INTRAVENOUS at 11:06

## 2023-04-09 RX ADMIN — FUROSEMIDE 20 MG: 10 INJECTION, SOLUTION INTRAMUSCULAR; INTRAVENOUS at 13:46

## 2023-04-09 RX ADMIN — ALLOPURINOL 300 MG: 300 TABLET ORAL at 08:40

## 2023-04-09 RX ADMIN — VALSARTAN 40 MG: 40 TABLET, FILM COATED ORAL at 08:41

## 2023-04-09 ASSESSMENT — ENCOUNTER SYMPTOMS
EYE REDNESS: 0
COLOR CHANGE: 0
CHEST TIGHTNESS: 0
RHINORRHEA: 0
EYE PAIN: 0
SHORTNESS OF BREATH: 1
ABDOMINAL DISTENTION: 0
CONSTIPATION: 0
APNEA: 0

## 2023-04-09 ASSESSMENT — PAIN SCALES - WONG BAKER

## 2023-04-10 VITALS
HEIGHT: 64 IN | TEMPERATURE: 97.9 F | WEIGHT: 175.04 LBS | DIASTOLIC BLOOD PRESSURE: 103 MMHG | RESPIRATION RATE: 17 BRPM | SYSTOLIC BLOOD PRESSURE: 148 MMHG | HEART RATE: 71 BPM | OXYGEN SATURATION: 98 % | BODY MASS INDEX: 29.88 KG/M2

## 2023-04-10 LAB
ABSOLUTE EOS #: 0 K/UL (ref 0–0.4)
ABSOLUTE IMMATURE GRANULOCYTE: 0 K/UL (ref 0–0.3)
ABSOLUTE LYMPH #: 1.88 K/UL (ref 1–4.8)
ABSOLUTE MONO #: 0.37 K/UL (ref 0.1–0.8)
ANION GAP SERPL CALCULATED.3IONS-SCNC: 9 MMOL/L (ref 9–17)
BASOPHILS # BLD: 0 % (ref 0–2)
BASOPHILS ABSOLUTE: 0 K/UL (ref 0–0.2)
BUN SERPL-MCNC: 31 MG/DL (ref 8–23)
CALCIUM SERPL-MCNC: 8.6 MG/DL (ref 8.6–10.4)
CHLORIDE SERPL-SCNC: 102 MMOL/L (ref 98–107)
CO2 SERPL-SCNC: 25 MMOL/L (ref 20–31)
CREAT SERPL-MCNC: 1.13 MG/DL (ref 0.7–1.2)
CRP SERPL HS-MCNC: 32 MG/L (ref 0–5)
EOSINOPHILS RELATIVE PERCENT: 0 % (ref 1–4)
GFR SERPL CREATININE-BSD FRML MDRD: >60 ML/MIN/1.73M2
GLUCOSE BLD-MCNC: 105 MG/DL (ref 75–110)
GLUCOSE BLD-MCNC: 118 MG/DL (ref 75–110)
GLUCOSE SERPL-MCNC: 156 MG/DL (ref 70–99)
HCT VFR BLD AUTO: 37.9 % (ref 40.7–50.3)
HGB BLD-MCNC: 13 G/DL (ref 13–17)
IMMATURE GRANULOCYTES: 0 %
LYMPHOCYTES # BLD: 46 % (ref 24–44)
M PNEUMO IGM SER QL IA: 0.11
MCH RBC QN AUTO: 29.8 PG (ref 25.2–33.5)
MCHC RBC AUTO-ENTMCNC: 34.3 G/DL (ref 28.4–34.8)
MCV RBC AUTO: 86.9 FL (ref 82.6–102.9)
MICROORGANISM SPEC CULT: NORMAL
MICROORGANISM SPEC CULT: NORMAL
MONOCYTES # BLD: 9 % (ref 1–7)
MORPHOLOGY: NORMAL
NRBC AUTOMATED: 0 PER 100 WBC
PATH REV BLD -IMP: NORMAL
PDW BLD-RTO: 13.2 % (ref 11.8–14.4)
PLATELET # BLD AUTO: ABNORMAL K/UL (ref 138–453)
PLATELET, FLUORESCENCE: 45 K/UL (ref 138–453)
PLATELET, IMMATURE FRACTION: 8.5 % (ref 1.1–10.3)
POTASSIUM SERPL-SCNC: 4.2 MMOL/L (ref 3.7–5.3)
RBC # BLD: 4.36 M/UL (ref 4.21–5.77)
SEG NEUTROPHILS: 45 % (ref 36–66)
SEGMENTED NEUTROPHILS ABSOLUTE COUNT: 1.85 K/UL (ref 1.8–7.7)
SERVICE CMNT-IMP: NORMAL
SERVICE CMNT-IMP: NORMAL
SODIUM SERPL-SCNC: 136 MMOL/L (ref 135–144)
SPECIMEN DESCRIPTION: NORMAL
SPECIMEN DESCRIPTION: NORMAL
SURGICAL PATHOLOGY REPORT: NORMAL
WBC # BLD AUTO: 4.1 K/UL (ref 3.5–11.3)

## 2023-04-10 PROCEDURE — 99232 SBSQ HOSP IP/OBS MODERATE 35: CPT | Performed by: INTERNAL MEDICINE

## 2023-04-10 PROCEDURE — 36415 COLL VENOUS BLD VENIPUNCTURE: CPT

## 2023-04-10 PROCEDURE — 85025 COMPLETE CBC W/AUTO DIFF WBC: CPT

## 2023-04-10 PROCEDURE — 6360000002 HC RX W HCPCS: Performed by: INTERNAL MEDICINE

## 2023-04-10 PROCEDURE — 99232 SBSQ HOSP IP/OBS MODERATE 35: CPT | Performed by: STUDENT IN AN ORGANIZED HEALTH CARE EDUCATION/TRAINING PROGRAM

## 2023-04-10 PROCEDURE — 6370000000 HC RX 637 (ALT 250 FOR IP): Performed by: STUDENT IN AN ORGANIZED HEALTH CARE EDUCATION/TRAINING PROGRAM

## 2023-04-10 PROCEDURE — 6370000000 HC RX 637 (ALT 250 FOR IP): Performed by: INTERNAL MEDICINE

## 2023-04-10 PROCEDURE — 85055 RETICULATED PLATELET ASSAY: CPT

## 2023-04-10 PROCEDURE — 80048 BASIC METABOLIC PNL TOTAL CA: CPT

## 2023-04-10 PROCEDURE — 2580000003 HC RX 258: Performed by: INTERNAL MEDICINE

## 2023-04-10 PROCEDURE — 82947 ASSAY GLUCOSE BLOOD QUANT: CPT

## 2023-04-10 PROCEDURE — 86140 C-REACTIVE PROTEIN: CPT

## 2023-04-10 RX ORDER — FUROSEMIDE 20 MG/1
20 TABLET ORAL DAILY PRN
Qty: 5 TABLET | Refills: 0 | Status: ON HOLD | OUTPATIENT
Start: 2023-04-10

## 2023-04-10 RX ORDER — FUROSEMIDE 20 MG/1
20 TABLET ORAL DAILY
Status: DISCONTINUED | OUTPATIENT
Start: 2023-04-10 | End: 2023-04-10 | Stop reason: HOSPADM

## 2023-04-10 RX ORDER — DEXAMETHASONE SODIUM PHOSPHATE 10 MG/ML
6 INJECTION INTRAMUSCULAR; INTRAVENOUS EVERY 6 HOURS
Status: DISCONTINUED | OUTPATIENT
Start: 2023-04-10 | End: 2023-04-10

## 2023-04-10 RX ADMIN — FUROSEMIDE 20 MG: 20 TABLET ORAL at 11:32

## 2023-04-10 RX ADMIN — REMDESIVIR 100 MG: 100 INJECTION, POWDER, LYOPHILIZED, FOR SOLUTION INTRAVENOUS at 11:29

## 2023-04-10 RX ADMIN — SODIUM CHLORIDE, PRESERVATIVE FREE 10 ML: 5 INJECTION INTRAVENOUS at 11:31

## 2023-04-10 RX ADMIN — DEXAMETHASONE 6 MG: 4 TABLET ORAL at 12:55

## 2023-04-10 RX ADMIN — LEVOTHYROXINE SODIUM 125 MCG: 125 TABLET ORAL at 06:28

## 2023-04-10 RX ADMIN — FINASTERIDE 5 MG: 5 TABLET, FILM COATED ORAL at 08:12

## 2023-04-10 RX ADMIN — APIXABAN 5 MG: 5 TABLET, FILM COATED ORAL at 11:31

## 2023-04-10 RX ADMIN — VALSARTAN 40 MG: 40 TABLET, FILM COATED ORAL at 08:13

## 2023-04-10 RX ADMIN — PANTOPRAZOLE SODIUM 40 MG: 40 TABLET, DELAYED RELEASE ORAL at 06:28

## 2023-04-10 RX ADMIN — ALLOPURINOL 300 MG: 300 TABLET ORAL at 08:12

## 2023-04-10 RX ADMIN — DOCUSATE SODIUM 100 MG: 100 CAPSULE ORAL at 08:12

## 2023-04-10 RX ADMIN — TAMSULOSIN HYDROCHLORIDE 0.4 MG: 0.4 CAPSULE ORAL at 08:12

## 2023-04-10 ASSESSMENT — ENCOUNTER SYMPTOMS
SHORTNESS OF BREATH: 1
COLOR CHANGE: 0
EYE PAIN: 0
RHINORRHEA: 0
ABDOMINAL DISTENTION: 0
EYE REDNESS: 0
CHEST TIGHTNESS: 0
APNEA: 0
CONSTIPATION: 0

## 2023-04-10 ASSESSMENT — PAIN SCALES - WONG BAKER

## 2023-04-10 NOTE — DISCHARGE SUMMARY
[facility]  Office: [address], DO, Camryn Waltersallum, DO, Bryan Pisano, DO, Cora [place] Blood, DO, Mike Mckeon MD, Ethan Umana MD, Deb Harman MD, Jose D Eng MD,  Tl Clark MD, Allison Snellen, MD, Myles Leiva, DO, Irene Hoang MD,  Rodrick Joe MD, Samantha Jeffries MD, Bernadette Dinero DO, Kp Gilbert MD, Rona Poe MD, Nnamdi Bassett DO, Reena Fairbanks MD, Laurel Cruz MD, Unique Gonzalez MD, Sisi Deng MD,  Darlene Ervin, DO, Iris Krause MD,  Sharmila Quick, CNP,  Naldo Gonzalez, CNP, Enriqueta Thayer, CNP, Gavin Ivory, CNP,  [facility], Brayden Byrne, CNP, Nohelia Marie, CNP, Judith Rubio, CNP, Maria Isabel Waggoner, CNP, Lakeisha Ma, CNP, Emilia Garcia PA-C, Edgardo Hendrickson, CNS, Yves Garcia, CNP, Monik Andre, CNP         [facility]    Discharge Summary     Patient ID: Daniela Lantigua  :  1950   MRN: [id number]     ACCOUNT:  [de-identified]   Patient's PCP: Cristy Harris MD  Admit Date: 2023   Discharge Date: 4/10/2023     Length of Stay: 3  Code Status:  Prior  Admitting Physician: No admitting provider for patient encounter. Discharge Physician: Irene Hoang MD     Active Discharge Diagnoses:     Hospital Problem Lists:  Principal Problem:    Pancytopenia ([address]  Active Problems:    Chronic diastolic CHF (congestive heart failure), NYHA class 2 (HCC)    SSS (sick sinus syndrome) (HCC)    Type 2 diabetes mellitus    PAF (paroxysmal atrial fibrillation) ([address]    COVID-19 virus infection    Essential hypertension    Obesity    Neutropenia associated with infection (HCC)    CRP elevated    Lymphocytopenia  Resolved Problems:    * No resolved hospital problems.  *      Admission Condition:  stable     Discharged Condition: stable    Hospital Stay:     Hospital Course:  Daniela Lantigua is a 67 y.o. male who was admitted for

## 2023-04-10 NOTE — CARE COORDINATION
04/10/23 1017   Readmission Assessment   In our efforts to provide the best possible care to you and others like you, can you think of anything that we could have done to help you after you left the hospital the first time, so that you might not have needed to return so soon? Discharge instructions that are concise, clear, and non contradictory;Education on how to continue taking medications upon discharge; Teach back instructions regarding management of illness

## 2023-04-11 PROBLEM — U07.1 COVID: Status: ACTIVE | Noted: 2023-04-11

## 2023-04-11 NOTE — PROGRESS NOTES
[facility] - Progress Note  COVID 19 Patient Telemedicine  Today's Date and Time: 4/10/2023, 6:53 AM    Impression :     COVID 19 Confirmed Infection  Covid tests:  4-5-23: Positive  Vaccination Status:   Received four vaccine doses  Syncope  Dehydration  Leucopenia  Thrombocytopenia  Elevated CRP with large elevation between 4-4- and 4-6-23    Recommendations:   Antibiotic treatment:  Monitor off antibiotics  Covid Rx:    Remdesivir. Requested 4-6-23. Stop date 4-10-23  Decadron: Will likely need decadron 6 mg a day x 10 days because of elevation in CRP indicative of entering into inflammatory phase of illness. Stop date 4-10-23  Actemra: Not indicated  Paxlovid: He is likely outside the window    ID wise OK to discharge home  Medical Decision Making/Summary/Discussion:4/10/2023     Patient admitted with COVID 19 infection    Infection Control Recommendations   [place] Precautions  Airborne isolation  Droplet Isolation  Isolate until 4-15-23    Antimicrobial Stewardship Recommendations       Discontinuation of therapy  Coordination of Outpatient Care:   Estimated Length of IV antimicrobials:TBD  Patient will need Midline Catheter Insertion: TBD  Patient will need PICC line Insertion: No  Patient will need: Home IV , [place],  SNF,  LTAC:TBD  Patient will need outpatient wound care:No    Chief complaint/reason for consultation:   Concern for COVID infection      History of Present Illness:   Mariana Whalen is a 67y.o.-year-old  male who was initially admitted on 4/7/2023. Patient seen at the request of Drea Hwang. INITIAL HISTORY:    Patient presented with complaints of fevers, chills, rigors. Reported onset of symptoms about a week before with cough and chest congestion. He then developed progressive worsening weakness, fatigue and fevers. He had a syncopal episode the day prior to admission without injury. And had residual dizziness.     He also
[facility]  Occupational Therapy Not Seen Note    DATE: 4/10/2023    NAME: Evelia Levine  MRN: [id number]   : 1950      Patient not seen this date for Occupational Therapy due to:    Patient independent with ADLs and functional tasks with no acute OT needs. Will defer OT evaluation at this time. Please reorder OT if future needs arise.      Electronically signed by PRABHJOT Thompson on 4/10/2023 at 10:05 AM
Physical Therapy        Physical Therapy Cancel Note      DATE: 4/10/2023    NAME: Nicholas Hollins  MRN: [id number]   : 1950      Patient not seen this date for Physical Therapy due to:    Patient independent with functional mobility. Will defer PT evaluation at this time. Please reorder PT if future needs arise.        Electronically signed by Chuy Tom PT on 4/10/2023 at 10:07 AM
Today's Date: 4/11/2023  Patient Name: Jose Luis Gallagher  Date of admission: 4/7/2023  1:04 PM  Patient's age: 67 y.o., 1950  Admission Dx: Pancytopenia [facility]) [L87.659]    Reason for Consult: Pancytopenia  Requesting Physician: No admitting provider for patient encounter. CHIEF COMPLAINT:    Chief Complaint   Patient presents with    Other     Abnormal labs       History Obtained From:  patient and chart    Interval history  Patient was seen and examined  No epistaxis  Labs improved WBC up to normal    HISTORY OF PRESENT ILLNESS:      Jose Luis Gallagher is a 67 y.o. male who is admitted to the hospital on 4/7/2023  for COVID-19 infection. 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 He was noted to have COVID-19 infection therefore he was transferred to [facility] for further care. On admission his counts were normal with mild low platelets at 497 but now have dropped to 23K, also his WBC is 1.0. Therefore hematology was consulted for evaluation of leukopenia and thrombocytopenia. Past Medical History:   has a past medical history of Asthma, BPH (benign prostatic hyperplasia), Calculus of gallbladder without cholecystitis without obstruction, Fracture of L1 vertebra ([address] GERD (gastroesophageal reflux disease), Gout, High altitude pulmonary edema, Hyperlipidemia, Hypertension, Radiculopathy of leg, Sleep apnea, Thyroid disease, and Wrist fracture, left. Past Surgical History:   has a past surgical history that includes Pacemaker insertion; Colon surgery; Colonoscopy; and Colonoscopy (02/28/2019). Medications:    Prior to Admission medications    Medication Sig Start Date End Date Taking?  Authorizing Provider   furosemide (LASIX) 20 MG tablet Take 1 tablet by mouth daily as needed (leg swelling) 4/10/23  Yes Perlita Burrell MD   apixaban (ELIQUIS) 5 MG TABS tablet Take 1 tablet by mouth 2
HEPATOBILIARY SCAN W EJECTION FRACTION    Result Date: 4/6/2023  1. Gallbladder ejection fraction is 55%. 2. No acute cholecystitis. Physical Examination:        Physical Exam  Constitutional:       Appearance: He is obese. He is not ill-appearing or diaphoretic. HENT:      Head: Normocephalic and atraumatic. Right Ear: External ear normal.      Left Ear: External ear normal.      Nose: Congestion present. No rhinorrhea. Mouth/Throat:      Mouth: Mucous membranes are moist.      Pharynx: No oropharyngeal exudate. Eyes:      General:         Right eye: No discharge. Left eye: No discharge. Extraocular Movements: Extraocular movements intact. Cardiovascular:      Rate and Rhythm: Normal rate and regular rhythm. Heart sounds: Murmur heard. Pulmonary:      Effort: Pulmonary effort is normal. No respiratory distress. Breath sounds: Normal breath sounds. No wheezing or rales. Abdominal:      General: There is no distension. Palpations: Abdomen is soft. Tenderness: There is no abdominal tenderness. Musculoskeletal:      Right lower leg: No edema. Left lower leg: No edema. Skin:     General: Skin is warm and dry. Capillary Refill: Capillary refill takes less than 2 seconds. Neurological:      General: No focal deficit present. Mental Status: He is alert and oriented to person, place, and time.    Psychiatric:         Mood and Affect: Mood normal.         Behavior: Behavior normal.       Assessment:        Hospital Problems             Last Modified POA    * (Principal) Pancytopenia ([address] 4/7/2023 Yes    Chronic diastolic CHF (congestive heart failure), NYHA class 2 ([address] 4/7/2023 Yes    SSS (sick sinus syndrome) ([address] 4/7/2023 Yes    Type 2 diabetes mellitus 4/7/2023 Yes    PAF (paroxysmal atrial fibrillation) ([address] 4/7/2023 Yes    COVID 4/8/2023 Yes    Essential hypertension 4/7/2023 Yes    Obesity 4/7/2023 Yes    Neutropenia associated with

## 2023-04-12 ENCOUNTER — APPOINTMENT (OUTPATIENT)
Dept: NON INVASIVE DIAGNOSTICS | Age: 73
DRG: 312 | End: 2023-04-12
Payer: MEDICARE

## 2023-04-12 ENCOUNTER — APPOINTMENT (OUTPATIENT)
Dept: CT IMAGING | Age: 73
DRG: 312 | End: 2023-04-12
Payer: MEDICARE

## 2023-04-12 ENCOUNTER — HOSPITAL ENCOUNTER (INPATIENT)
Age: 73
LOS: 1 days | Discharge: HOME | DRG: 312 | End: 2023-04-13
Attending: EMERGENCY MEDICINE | Admitting: FAMILY MEDICINE
Payer: MEDICARE

## 2023-04-12 ENCOUNTER — APPOINTMENT (OUTPATIENT)
Dept: GENERAL RADIOLOGY | Age: 73
DRG: 312 | End: 2023-04-12
Payer: MEDICARE

## 2023-04-12 DIAGNOSIS — R55 SYNCOPE, UNSPECIFIED SYNCOPE TYPE: Primary | ICD-10-CM

## 2023-04-12 DIAGNOSIS — E86.0 DEHYDRATION: ICD-10-CM

## 2023-04-12 PROBLEM — R10.13 EPIGASTRIC PAIN: Status: ACTIVE | Noted: 2023-04-12

## 2023-04-12 LAB
ABSOLUTE EOS #: 0.07 K/UL (ref 0–0.44)
ABSOLUTE IMMATURE GRANULOCYTE: 0.04 K/UL (ref 0–0.3)
ABSOLUTE LYMPH #: 1.02 K/UL (ref 1.1–3.7)
ABSOLUTE MONO #: 0.4 K/UL (ref 0.1–1.2)
ALBUMIN SERPL-MCNC: 2.9 G/DL (ref 3.5–5.2)
ALBUMIN/GLOBULIN RATIO: 0.9 (ref 1–2.5)
ALP SERPL-CCNC: 52 U/L (ref 40–129)
ALT SERPL-CCNC: 97 U/L (ref 5–41)
ANION GAP SERPL CALCULATED.3IONS-SCNC: 8 MMOL/L (ref 9–17)
AST SERPL-CCNC: 74 U/L
BASOPHILS # BLD: 0 % (ref 0–2)
BASOPHILS ABSOLUTE: 0 K/UL (ref 0–0.2)
BILIRUB SERPL-MCNC: 1 MG/DL (ref 0.3–1.2)
BILIRUBIN URINE: NEGATIVE
BUN SERPL-MCNC: 35 MG/DL (ref 8–23)
BUN/CREAT BLD: 32 (ref 9–20)
CALCIUM SERPL-MCNC: 8.6 MG/DL (ref 8.6–10.4)
CHLORIDE SERPL-SCNC: 101 MMOL/L (ref 98–107)
CO2 SERPL-SCNC: 25 MMOL/L (ref 20–31)
COLOR: YELLOW
CREAT SERPL-MCNC: 1.11 MG/DL (ref 0.7–1.2)
EKG ATRIAL RATE: 75 BPM
EKG P AXIS: 43 DEGREES
EKG P-R INTERVAL: 132 MS
EKG Q-T INTERVAL: 410 MS
EKG QRS DURATION: 152 MS
EKG QTC CALCULATION (BAZETT): 457 MS
EKG R AXIS: -18 DEGREES
EKG T AXIS: -5 DEGREES
EKG VENTRICULAR RATE: 75 BPM
EOSINOPHILS RELATIVE PERCENT: 2 % (ref 1–4)
GFR SERPL CREATININE-BSD FRML MDRD: >60 ML/MIN/1.73M2
GLUCOSE SERPL-MCNC: 84 MG/DL (ref 70–99)
GLUCOSE UR STRIP.AUTO-MCNC: NEGATIVE MG/DL
HCT VFR BLD AUTO: 36.4 % (ref 40.7–50.3)
HGB BLD-MCNC: 12.9 G/DL (ref 13–17)
IMMATURE GRANULOCYTES: 1 %
KETONES UR STRIP.AUTO-MCNC: NEGATIVE MG/DL
LEUKOCYTE ESTERASE UR QL STRIP.AUTO: NEGATIVE
LIPASE SERPL-CCNC: 69 U/L (ref 13–60)
LV EF: 65 %
LVEF MODALITY: NORMAL
LYMPHOCYTES # BLD: 28 % (ref 24–43)
MAGNESIUM SERPL-MCNC: 2.1 MG/DL (ref 1.6–2.6)
MCH RBC QN AUTO: 31 PG (ref 25.2–33.5)
MCHC RBC AUTO-ENTMCNC: 35.4 G/DL (ref 28.4–34.8)
MCV RBC AUTO: 87.5 FL (ref 82.6–102.9)
MONOCYTES # BLD: 11 % (ref 3–12)
MORPHOLOGY: ABNORMAL
NITRITE UR QL STRIP.AUTO: NEGATIVE
NRBC AUTOMATED: 0.5 PER 100 WBC
NUCLEATED RED BLOOD CELLS: 2 PER 100 WBC (ref 0–5)
PDW BLD-RTO: 13.2 % (ref 11.8–14.4)
PLATELET # BLD AUTO: ABNORMAL K/UL (ref 138–453)
PLATELET, FLUORESCENCE: 89 K/UL (ref 138–453)
PLATELET, IMMATURE FRACTION: 2.7 % (ref 1.1–10.3)
POTASSIUM SERPL-SCNC: 4.1 MMOL/L (ref 3.7–5.3)
PROT SERPL-MCNC: 6.3 G/DL (ref 6.4–8.3)
PROT UR STRIP.AUTO-MCNC: 7 MG/DL (ref 5–9)
PROT UR STRIP.AUTO-MCNC: NEGATIVE MG/DL
RBC # BLD: 4.16 M/UL (ref 4.21–5.77)
SEG NEUTROPHILS: 58 % (ref 36–65)
SEGMENTED NEUTROPHILS ABSOLUTE COUNT: 2.1 K/UL (ref 1.5–8.1)
SODIUM SERPL-SCNC: 134 MMOL/L (ref 135–144)
SPECIFIC GRAVITY UA: 1.01 (ref 1.01–1.02)
TROPONIN I SERPL DL<=0.01 NG/ML-MCNC: 13 NG/L (ref 0–22)
TSH SERPL-ACNC: 1.26 UIU/ML (ref 0.3–5)
TURBIDITY: CLEAR
URINE HGB: NEGATIVE
UROBILINOGEN, URINE: NORMAL
WBC # BLD AUTO: 3.6 K/UL (ref 3.5–11.3)

## 2023-04-12 PROCEDURE — 85055 RETICULATED PLATELET ASSAY: CPT

## 2023-04-12 PROCEDURE — 96361 HYDRATE IV INFUSION ADD-ON: CPT

## 2023-04-12 PROCEDURE — C9113 INJ PANTOPRAZOLE SODIUM, VIA: HCPCS | Performed by: NURSE PRACTITIONER

## 2023-04-12 PROCEDURE — 85025 COMPLETE CBC W/AUTO DIFF WBC: CPT

## 2023-04-12 PROCEDURE — 83690 ASSAY OF LIPASE: CPT

## 2023-04-12 PROCEDURE — 94761 N-INVAS EAR/PLS OXIMETRY MLT: CPT

## 2023-04-12 PROCEDURE — 94664 DEMO&/EVAL PT USE INHALER: CPT

## 2023-04-12 PROCEDURE — 99285 EMERGENCY DEPT VISIT HI MDM: CPT

## 2023-04-12 PROCEDURE — 84484 ASSAY OF TROPONIN QUANT: CPT

## 2023-04-12 PROCEDURE — 6360000002 HC RX W HCPCS: Performed by: NURSE PRACTITIONER

## 2023-04-12 PROCEDURE — A4216 STERILE WATER/SALINE, 10 ML: HCPCS | Performed by: EMERGENCY MEDICINE

## 2023-04-12 PROCEDURE — 96374 THER/PROPH/DIAG INJ IV PUSH: CPT

## 2023-04-12 PROCEDURE — 93005 ELECTROCARDIOGRAM TRACING: CPT | Performed by: EMERGENCY MEDICINE

## 2023-04-12 PROCEDURE — 93306 TTE W/DOPPLER COMPLETE: CPT

## 2023-04-12 PROCEDURE — 36415 COLL VENOUS BLD VENIPUNCTURE: CPT

## 2023-04-12 PROCEDURE — A4216 STERILE WATER/SALINE, 10 ML: HCPCS | Performed by: NURSE PRACTITIONER

## 2023-04-12 PROCEDURE — 2500000003 HC RX 250 WO HCPCS: Performed by: EMERGENCY MEDICINE

## 2023-04-12 PROCEDURE — 93280 PM DEVICE PROGR EVAL DUAL: CPT | Performed by: FAMILY MEDICINE

## 2023-04-12 PROCEDURE — 6370000000 HC RX 637 (ALT 250 FOR IP): Performed by: NURSE PRACTITIONER

## 2023-04-12 PROCEDURE — C8929 TTE W OR WO FOL WCON,DOPPLER: HCPCS

## 2023-04-12 PROCEDURE — 71046 X-RAY EXAM CHEST 2 VIEWS: CPT

## 2023-04-12 PROCEDURE — 83735 ASSAY OF MAGNESIUM: CPT

## 2023-04-12 PROCEDURE — 2580000003 HC RX 258: Performed by: NURSE PRACTITIONER

## 2023-04-12 PROCEDURE — 93010 ELECTROCARDIOGRAM REPORT: CPT | Performed by: INTERNAL MEDICINE

## 2023-04-12 PROCEDURE — 80053 COMPREHEN METABOLIC PANEL: CPT

## 2023-04-12 PROCEDURE — 84443 ASSAY THYROID STIM HORMONE: CPT

## 2023-04-12 PROCEDURE — 70450 CT HEAD/BRAIN W/O DYE: CPT

## 2023-04-12 PROCEDURE — 81003 URINALYSIS AUTO W/O SCOPE: CPT

## 2023-04-12 PROCEDURE — 1200000000 HC SEMI PRIVATE

## 2023-04-12 PROCEDURE — 6360000004 HC RX CONTRAST MEDICATION: Performed by: INTERNAL MEDICINE

## 2023-04-12 PROCEDURE — 2580000003 HC RX 258: Performed by: EMERGENCY MEDICINE

## 2023-04-12 PROCEDURE — 94640 AIRWAY INHALATION TREATMENT: CPT

## 2023-04-12 RX ORDER — VALSARTAN 80 MG/1
40 TABLET ORAL DAILY
Status: DISCONTINUED | OUTPATIENT
Start: 2023-04-12 | End: 2023-04-13 | Stop reason: HOSPADM

## 2023-04-12 RX ORDER — SODIUM CHLORIDE 9 MG/ML
INJECTION, SOLUTION INTRAVENOUS CONTINUOUS
Status: DISCONTINUED | OUTPATIENT
Start: 2023-04-12 | End: 2023-04-13

## 2023-04-12 RX ORDER — SODIUM CHLORIDE 9 MG/ML
INJECTION, SOLUTION INTRAVENOUS PRN
Status: DISCONTINUED | OUTPATIENT
Start: 2023-04-12 | End: 2023-04-13 | Stop reason: HOSPADM

## 2023-04-12 RX ORDER — TAMSULOSIN HYDROCHLORIDE 0.4 MG/1
0.4 CAPSULE ORAL DAILY
Status: DISCONTINUED | OUTPATIENT
Start: 2023-04-12 | End: 2023-04-13 | Stop reason: HOSPADM

## 2023-04-12 RX ORDER — SODIUM CHLORIDE 0.9 % (FLUSH) 0.9 %
10 SYRINGE (ML) INJECTION EVERY 12 HOURS SCHEDULED
Status: DISCONTINUED | OUTPATIENT
Start: 2023-04-12 | End: 2023-04-13 | Stop reason: HOSPADM

## 2023-04-12 RX ORDER — SODIUM CHLORIDE 0.9 % (FLUSH) 0.9 %
10 SYRINGE (ML) INJECTION PRN
Status: DISCONTINUED | OUTPATIENT
Start: 2023-04-12 | End: 2023-04-13 | Stop reason: HOSPADM

## 2023-04-12 RX ORDER — MORPHINE SULFATE 2 MG/ML
2 INJECTION, SOLUTION INTRAMUSCULAR; INTRAVENOUS EVERY 4 HOURS PRN
Status: DISCONTINUED | OUTPATIENT
Start: 2023-04-12 | End: 2023-04-13 | Stop reason: HOSPADM

## 2023-04-12 RX ORDER — SUCRALFATE 1 G/1
1 TABLET ORAL
Status: DISCONTINUED | OUTPATIENT
Start: 2023-04-12 | End: 2023-04-13 | Stop reason: HOSPADM

## 2023-04-12 RX ORDER — IPRATROPIUM BROMIDE 21 UG/1
2 SPRAY, METERED NASAL 2 TIMES DAILY PRN
Status: DISCONTINUED | OUTPATIENT
Start: 2023-04-12 | End: 2023-04-12 | Stop reason: CLARIF

## 2023-04-12 RX ORDER — FINASTERIDE 5 MG/1
5 TABLET, FILM COATED ORAL DAILY
Status: DISCONTINUED | OUTPATIENT
Start: 2023-04-12 | End: 2023-04-13 | Stop reason: HOSPADM

## 2023-04-12 RX ORDER — ATORVASTATIN CALCIUM 10 MG/1
10 TABLET, FILM COATED ORAL DAILY
Status: DISCONTINUED | OUTPATIENT
Start: 2023-04-12 | End: 2023-04-13 | Stop reason: HOSPADM

## 2023-04-12 RX ORDER — ALBUTEROL SULFATE 90 UG/1
2 AEROSOL, METERED RESPIRATORY (INHALATION) 3 TIMES DAILY
Status: DISCONTINUED | OUTPATIENT
Start: 2023-04-12 | End: 2023-04-13 | Stop reason: HOSPADM

## 2023-04-12 RX ORDER — ACETAMINOPHEN 160 MG/5ML
650 SOLUTION ORAL EVERY 6 HOURS PRN
Status: DISCONTINUED | OUTPATIENT
Start: 2023-04-12 | End: 2023-04-12

## 2023-04-12 RX ORDER — ALBUTEROL SULFATE 90 UG/1
2 AEROSOL, METERED RESPIRATORY (INHALATION) EVERY 6 HOURS PRN
Status: DISCONTINUED | OUTPATIENT
Start: 2023-04-12 | End: 2023-04-12

## 2023-04-12 RX ORDER — ACETAMINOPHEN 325 MG/1
650 TABLET ORAL EVERY 6 HOURS PRN
Status: DISCONTINUED | OUTPATIENT
Start: 2023-04-12 | End: 2023-04-12

## 2023-04-12 RX ORDER — ONDANSETRON 2 MG/ML
4 INJECTION INTRAMUSCULAR; INTRAVENOUS EVERY 6 HOURS PRN
Status: DISCONTINUED | OUTPATIENT
Start: 2023-04-12 | End: 2023-04-13 | Stop reason: HOSPADM

## 2023-04-12 RX ORDER — ONDANSETRON 4 MG/1
4 TABLET, ORALLY DISINTEGRATING ORAL EVERY 8 HOURS PRN
Status: DISCONTINUED | OUTPATIENT
Start: 2023-04-12 | End: 2023-04-13 | Stop reason: HOSPADM

## 2023-04-12 RX ORDER — ACETAMINOPHEN 325 MG/1
650 TABLET ORAL EVERY 4 HOURS PRN
Status: DISCONTINUED | OUTPATIENT
Start: 2023-04-12 | End: 2023-04-13 | Stop reason: HOSPADM

## 2023-04-12 RX ORDER — ACETAMINOPHEN 650 MG/1
650 SUPPOSITORY RECTAL EVERY 6 HOURS PRN
Status: DISCONTINUED | OUTPATIENT
Start: 2023-04-12 | End: 2023-04-12

## 2023-04-12 RX ORDER — 0.9 % SODIUM CHLORIDE 0.9 %
250 INTRAVENOUS SOLUTION INTRAVENOUS ONCE
Status: COMPLETED | OUTPATIENT
Start: 2023-04-12 | End: 2023-04-12

## 2023-04-12 RX ORDER — LEVOTHYROXINE SODIUM 0.12 MG/1
125 TABLET ORAL DAILY
Status: DISCONTINUED | OUTPATIENT
Start: 2023-04-12 | End: 2023-04-13 | Stop reason: HOSPADM

## 2023-04-12 RX ORDER — ALBUTEROL SULFATE 90 UG/1
2 AEROSOL, METERED RESPIRATORY (INHALATION) EVERY 4 HOURS PRN
Status: DISCONTINUED | OUTPATIENT
Start: 2023-04-12 | End: 2023-04-13 | Stop reason: HOSPADM

## 2023-04-12 RX ORDER — POLYETHYLENE GLYCOL 3350 17 G/17G
17 POWDER, FOR SOLUTION ORAL DAILY PRN
Status: DISCONTINUED | OUTPATIENT
Start: 2023-04-12 | End: 2023-04-13 | Stop reason: HOSPADM

## 2023-04-12 RX ORDER — BUDESONIDE 0.25 MG/2ML
0.25 INHALANT ORAL 2 TIMES DAILY
Status: DISCONTINUED | OUTPATIENT
Start: 2023-04-12 | End: 2023-04-13 | Stop reason: HOSPADM

## 2023-04-12 RX ADMIN — APIXABAN 5 MG: 5 TABLET, FILM COATED ORAL at 15:38

## 2023-04-12 RX ADMIN — SODIUM CHLORIDE: 9 INJECTION, SOLUTION INTRAVENOUS at 09:59

## 2023-04-12 RX ADMIN — SODIUM CHLORIDE 250 ML: 9 INJECTION, SOLUTION INTRAVENOUS at 06:13

## 2023-04-12 RX ADMIN — PANTOPRAZOLE SODIUM 40 MG: 40 INJECTION, POWDER, FOR SOLUTION INTRAVENOUS at 10:40

## 2023-04-12 RX ADMIN — BUDESONIDE 250 MCG: 0.25 SUSPENSION RESPIRATORY (INHALATION) at 11:23

## 2023-04-12 RX ADMIN — FINASTERIDE 5 MG: 5 TABLET, FILM COATED ORAL at 15:38

## 2023-04-12 RX ADMIN — PERFLUTREN 1.5 ML: 6.52 INJECTION, SUSPENSION INTRAVENOUS at 13:28

## 2023-04-12 RX ADMIN — TAMSULOSIN HYDROCHLORIDE 0.4 MG: 0.4 CAPSULE ORAL at 15:38

## 2023-04-12 RX ADMIN — FAMOTIDINE 20 MG: 10 INJECTION, SOLUTION INTRAVENOUS at 07:15

## 2023-04-12 RX ADMIN — SUCRALFATE 1 G: 1 TABLET ORAL at 21:36

## 2023-04-12 RX ADMIN — SUCRALFATE 1 G: 1 TABLET ORAL at 16:49

## 2023-04-12 RX ADMIN — SODIUM CHLORIDE: 9 INJECTION, SOLUTION INTRAVENOUS at 19:57

## 2023-04-12 ASSESSMENT — PAIN SCALES - WONG BAKER
WONGBAKER_NUMERICALRESPONSE: 0

## 2023-04-12 ASSESSMENT — PAIN - FUNCTIONAL ASSESSMENT
PAIN_FUNCTIONAL_ASSESSMENT: ACTIVITIES ARE NOT PREVENTED
PAIN_FUNCTIONAL_ASSESSMENT: NONE - DENIES PAIN
PAIN_FUNCTIONAL_ASSESSMENT: ACTIVITIES ARE NOT PREVENTED

## 2023-04-12 ASSESSMENT — PAIN DESCRIPTION - DESCRIPTORS
DESCRIPTORS: SHARP;DISCOMFORT
DESCRIPTORS: ACHING

## 2023-04-12 ASSESSMENT — PAIN DESCRIPTION - PAIN TYPE
TYPE: ACUTE PAIN
TYPE: ACUTE PAIN

## 2023-04-12 ASSESSMENT — PAIN DESCRIPTION - LOCATION: LOCATION: BACK

## 2023-04-12 ASSESSMENT — PAIN DESCRIPTION - ONSET
ONSET: ON-GOING
ONSET: ON-GOING

## 2023-04-12 ASSESSMENT — PAIN SCALES - GENERAL
PAINLEVEL_OUTOF10: 0
PAINLEVEL_OUTOF10: 5
PAINLEVEL_OUTOF10: 8

## 2023-04-12 ASSESSMENT — PAIN DESCRIPTION - ORIENTATION: ORIENTATION: LOWER

## 2023-04-12 ASSESSMENT — PAIN DESCRIPTION - FREQUENCY
FREQUENCY: INTERMITTENT
FREQUENCY: CONTINUOUS

## 2023-04-12 ASSESSMENT — LIFESTYLE VARIABLES: HOW OFTEN DO YOU HAVE A DRINK CONTAINING ALCOHOL: NEVER

## 2023-04-12 NOTE — ED PROVIDER NOTES
Care assumed from Dr. Jamaal Odell at the conclusion of her clinical shift while pending decision on admission. Evaluated by cardiology, Dr. Twyla Siddiqui. Will be admitted by PCP, Dr. Hao Valle.      Krystyna Yen MD  04/12/23 7068
for the following components:    Lipase 69 (*)     All other components within normal limits   IMMATURE PLATELET FRACTION - Abnormal; Notable for the following components:    Platelet, Fluorescence 89 (*)     All other components within normal limits   CBC WITH AUTO DIFFERENTIAL - Abnormal; Notable for the following components:    WBC 3.0 (*)     RBC 4.10 (*)     Hemoglobin 12.7 (*)     Hematocrit 36.4 (*)     MCHC 34.9 (*)     Lymphocytes 22 (*)     Monocytes 19 (*)     Absolute Lymph # 0.66 (*)     All other components within normal limits   COMPREHENSIVE METABOLIC PANEL W/ REFLEX TO MG FOR LOW K - Abnormal; Notable for the following components:    BUN 24 (*)     Calcium 8.4 (*)     Anion Gap 8 (*)     ALT 67 (*)     Total Bilirubin 1.6 (*)     Total Protein 6.2 (*)     Albumin 2.9 (*)     Albumin/Globulin Ratio 0.9 (*)     All other components within normal limits   IMMATURE PLATELET FRACTION - Abnormal; Notable for the following components:    Platelet, Fluorescence 86 (*)     All other components within normal limits   URINALYSIS   TSH WITH REFLEX   MAGNESIUM   TROPONIN   TROPONIN       All other labs were within normal range or not returned as of this dictation. EMERGENCY DEPARTMENT COURSE and DIFFERENTIAL DIAGNOSIS/MDM:   Vitals:    Vitals:    04/13/23 0630 04/13/23 0651 04/13/23 0722 04/13/23 1418   BP:  120/79  119/77   Pulse:  80  (!) 102   Resp:  18  18   Temp:  99.2 °F (37.3 °C)  98.9 °F (37.2 °C)   TempSrc:  Temporal  Temporal   SpO2:  100%  98%   Weight: 176 lb 5.9 oz (80 kg)      Height:   5' 4\" (1.626 m)            MDM  Number of Diagnoses or Management Options  Dehydration  Syncope, unspecified syncope type  Diagnosis management comments: Spoke with Dr. Andie Allen who evaluates the patient in the emergency department for likely admission. EKG unchanged laboratory studies unremarkable.   Patient did have recent HIDA scan and CT scans for his gallbladder performed when he was admitted at [facility]. Serena

## 2023-04-12 NOTE — H&P
products, herbals, cannabinoid products and bitamin/mineral/dietary/nutritional supplements. Philippe 'yes\", Ramiro Em     []  The patient is not eligible for medication reconciliation; the patient is in an emergent medical situation where delaying treatment would jeopardize the patient's health    []  I did NOT confirm, update or review the patient's current list of medications today. [DOES NOT SATISFY [employer] PERFORMANCE]        [facility] Advanced Care Planning documentation:  [x] I have confirmed that the patient's Advance Care Plan is present, Code Status is documented, or surrogate decision maker is listed in the patient's medical record  [If \"yes\", STOP HERE]     [] The patient's Advance Care Plan is NOT present because:    []  I confirmed today that the patient does not wish or was not able to name a   surrogate decision maker or provide and advance care plan.    [] Hospice care is currently being provided or has been provided within the   calendar year. []  I did NOT confirm today the presence of an [address] or surrogate   decision maker documented within the patient's medical record. [DOES NOT SATISFY [facility] PERFORMANCE]    CORE MEASURES  DVT prophylaxis: already on chronic anticoagulation  Decubitus ulcer present on admission: No  CODE STATUS: FULL CODE  Nutrition Status: good   Physical therapy: Yes   Old Charts reviewed: Yes  EKG Reviewed:  Yes  Advance Directive Addressed: Yes    YESSENIA Moe CNP, YESSENIA, NP-C  4/12/2023, 12:44 PM

## 2023-04-12 NOTE — CARE COORDINATION
Return to Present Housing: Yes  Other Identified Issues/Barriers to RETURNING to current housing: none  Potential Assistance needed at discharge: [address]            Potential DME: Cpap  Patient expects to discharge to: [address] [place] [address] for transportation at discharge: Family    Financial    Payor: [address] [place] [address],[address] / Plan: MEDICARE PART A AND B / Product Type: *No Product type* /     Does insurance require precert for SNF: No    Potential assistance Purchasing Medications: No  Meds-to-Beds request:  no      [employer] [address] [address]  Phone: [phone] Fax: [phone]      Notes:    Factors facilitating achievement of predicted outcomes: Family support, Friend support, Cooperative, Pleasant, and Good insight into deficits    Barriers to discharge: none    Additional Case Management Notes: Discussed discharge plans with the patient and wife. Patient is  and lives at home with his wife. He uses a C-pap machine at home. Patient's wife does the cooking and the cleaning. He is independent with his ADL's. Patient manages his own medication and drives. He has no outside services in the home. The discharge plan is home with no services at this time. The Plan for Transition of Care is related to the following treatment goals of Syncope and collapse [R55]  Dehydration [E86.0]  Syncope, unspecified syncope type [R55]    Transportation/Food Security/Housekeeping Addressed: No issues identified or concerns. The Patient and/or Patient Representative Agree with the Discharge Plan? Yes    The discharge plan is home with no services at this time.     [facility]  Case Management Department  Ph: [phone]

## 2023-04-12 NOTE — RT PROTOCOL NOTE
RESPIRATORY ASSESSMENT PROTOCOL                                                                                              Patient Name: Yariel Myers Room#: 7778/7632-20 : 1950     Admitting diagnosis: Syncope and collapse [R55]  Dehydration [E86.0]  Syncope, unspecified syncope type [R55]       Medical History:   Past Medical History:   Diagnosis Date    Asthma     BPH (benign prostatic hyperplasia)     Calculus of gallbladder without cholecystitis without obstruction 2023    Fracture of L1 vertebra ([address] 10/2010    GERD (gastroesophageal reflux disease)     Gout     High altitude pulmonary edema 2010    Hyperlipidemia     Hypertension     Radiculopathy of leg     left leg    Sleep apnea     Treated C-PAP 10 years    Thyroid disease     Wrist fracture, left 10/2010       PATIENT ASSESSMENT    LABORATORY DATA  Hematology:   Lab Results   Component Value Date/Time    WBC 3.6 2023 05:55 AM    RBC 4.16 2023 05:55 AM    RBC 3.54 2012 05:00 AM    HGB 12.9 2023 05:55 AM    HCT 36.4 2023 05:55 AM    PLT See Reflexed IPF Result 2023 05:55 AM     2012 05:00 AM     Chemistry:  No results found for: PHART, GGT3GXM, PO2ART, U1ZNXRJH, FDS2ZBD, PBEA    VITALS  Heart Rate: 94   Resp: 18  BP: 135/81  SpO2: 98 % O2 Device: None (Room air)  Temp: 98.3 °F (36.8 °C)    SKIN COLOR  [x] Normal  [] Pale  [] Dusky  [] Cyanotic    RESPIRATORY PATTERN  [x] Normal  [] Dyspnea  [] Cheyne-Santos  [] Kussmaul  [] Biots    AMBULATORY  [] Yes  [] No  [x] With Assistance    PEAK FLOW  Predicted:     Personal Best:            Patient Acuity 0 1 2 3 4 Score   Level of Consciousness (LOC) [x]  Alert & Oriented or Pt normal LOC []  Confused;follows directions []  Confused & uncooper-ative []  Obtunded []  Comatose 0   Respiratory Rate  (RR) [x]  Reg. rate & pattern. 12 - 20 bpm  []  Increased RR.  Greater than 20 bpm   []  SOB w/ exertion or RR greater than 24 bpm []  Access- ory

## 2023-04-13 ENCOUNTER — APPOINTMENT (OUTPATIENT)
Dept: CT IMAGING | Age: 73
DRG: 312 | End: 2023-04-13
Payer: MEDICARE

## 2023-04-13 ENCOUNTER — TELEPHONE (OUTPATIENT)
Dept: GASTROENTEROLOGY | Age: 73
End: 2023-04-13

## 2023-04-13 VITALS
WEIGHT: 176.37 LBS | TEMPERATURE: 98.9 F | RESPIRATION RATE: 18 BRPM | HEART RATE: 102 BPM | SYSTOLIC BLOOD PRESSURE: 119 MMHG | BODY MASS INDEX: 30.11 KG/M2 | HEIGHT: 64 IN | DIASTOLIC BLOOD PRESSURE: 77 MMHG | OXYGEN SATURATION: 98 %

## 2023-04-13 PROBLEM — E44.1 MILD MALNUTRITION (HCC): Status: ACTIVE | Noted: 2023-04-13

## 2023-04-13 LAB
ABSOLUTE EOS #: 0.12 K/UL (ref 0–0.44)
ABSOLUTE IMMATURE GRANULOCYTE: 0 K/UL (ref 0–0.3)
ABSOLUTE LYMPH #: 0.66 K/UL (ref 1.1–3.7)
ABSOLUTE MONO #: 0.57 K/UL (ref 0.1–1.2)
ALBUMIN SERPL-MCNC: 2.9 G/DL (ref 3.5–5.2)
ALBUMIN/GLOBULIN RATIO: 0.9 (ref 1–2.5)
ALP SERPL-CCNC: 51 U/L (ref 40–129)
ALT SERPL-CCNC: 67 U/L (ref 5–41)
ANION GAP SERPL CALCULATED.3IONS-SCNC: 8 MMOL/L (ref 9–17)
AST SERPL-CCNC: 35 U/L
BASOPHILS # BLD: 0 % (ref 0–2)
BASOPHILS ABSOLUTE: 0 K/UL (ref 0–0.2)
BILIRUB SERPL-MCNC: 1.6 MG/DL (ref 0.3–1.2)
BUN SERPL-MCNC: 24 MG/DL (ref 8–23)
BUN/CREAT BLD: 20 (ref 9–20)
CALCIUM SERPL-MCNC: 8.4 MG/DL (ref 8.6–10.4)
CHLORIDE SERPL-SCNC: 103 MMOL/L (ref 98–107)
CO2 SERPL-SCNC: 26 MMOL/L (ref 20–31)
CREAT SERPL-MCNC: 1.19 MG/DL (ref 0.7–1.2)
EKG ATRIAL RATE: 83 BPM
EKG P AXIS: -5 DEGREES
EKG P-R INTERVAL: 154 MS
EKG Q-T INTERVAL: 402 MS
EKG QRS DURATION: 144 MS
EKG QTC CALCULATION (BAZETT): 472 MS
EKG R AXIS: -23 DEGREES
EKG T AXIS: -6 DEGREES
EKG VENTRICULAR RATE: 83 BPM
EOSINOPHILS RELATIVE PERCENT: 4 % (ref 1–4)
GFR SERPL CREATININE-BSD FRML MDRD: >60 ML/MIN/1.73M2
GLUCOSE SERPL-MCNC: 80 MG/DL (ref 70–99)
HCT VFR BLD AUTO: 36.4 % (ref 40.7–50.3)
HGB BLD-MCNC: 12.7 G/DL (ref 13–17)
IMMATURE GRANULOCYTES: 0 %
LYMPHOCYTES # BLD: 22 % (ref 24–43)
MCH RBC QN AUTO: 31 PG (ref 25.2–33.5)
MCHC RBC AUTO-ENTMCNC: 34.9 G/DL (ref 28.4–34.8)
MCV RBC AUTO: 88.8 FL (ref 82.6–102.9)
MONOCYTES # BLD: 19 % (ref 3–12)
MORPHOLOGY: NORMAL
NRBC AUTOMATED: 0 PER 100 WBC
PDW BLD-RTO: 13.1 % (ref 11.8–14.4)
PLATELET # BLD AUTO: ABNORMAL K/UL (ref 138–453)
PLATELET, FLUORESCENCE: 86 K/UL (ref 138–453)
PLATELET, IMMATURE FRACTION: 2.2 % (ref 1.1–10.3)
POTASSIUM SERPL-SCNC: 4.2 MMOL/L (ref 3.7–5.3)
PROT SERPL-MCNC: 6.2 G/DL (ref 6.4–8.3)
RBC # BLD: 4.1 M/UL (ref 4.21–5.77)
SEG NEUTROPHILS: 55 % (ref 36–65)
SEGMENTED NEUTROPHILS ABSOLUTE COUNT: 1.65 K/UL (ref 1.5–8.1)
SODIUM SERPL-SCNC: 137 MMOL/L (ref 135–144)
TROPONIN I SERPL DL<=0.01 NG/ML-MCNC: 18 NG/L (ref 0–22)
WBC # BLD AUTO: 3 K/UL (ref 3.5–11.3)

## 2023-04-13 PROCEDURE — 85055 RETICULATED PLATELET ASSAY: CPT

## 2023-04-13 PROCEDURE — 84484 ASSAY OF TROPONIN QUANT: CPT

## 2023-04-13 PROCEDURE — 6360000002 HC RX W HCPCS: Performed by: NURSE PRACTITIONER

## 2023-04-13 PROCEDURE — C9113 INJ PANTOPRAZOLE SODIUM, VIA: HCPCS | Performed by: NURSE PRACTITIONER

## 2023-04-13 PROCEDURE — 93010 ELECTROCARDIOGRAM REPORT: CPT | Performed by: INTERNAL MEDICINE

## 2023-04-13 PROCEDURE — 93005 ELECTROCARDIOGRAM TRACING: CPT | Performed by: NURSE PRACTITIONER

## 2023-04-13 PROCEDURE — 80053 COMPREHEN METABOLIC PANEL: CPT

## 2023-04-13 PROCEDURE — 36415 COLL VENOUS BLD VENIPUNCTURE: CPT

## 2023-04-13 PROCEDURE — 2580000003 HC RX 258: Performed by: NURSE PRACTITIONER

## 2023-04-13 PROCEDURE — 6370000000 HC RX 637 (ALT 250 FOR IP): Performed by: NURSE PRACTITIONER

## 2023-04-13 PROCEDURE — 97166 OT EVAL MOD COMPLEX 45 MIN: CPT

## 2023-04-13 PROCEDURE — 97162 PT EVAL MOD COMPLEX 30 MIN: CPT

## 2023-04-13 PROCEDURE — A4216 STERILE WATER/SALINE, 10 ML: HCPCS | Performed by: NURSE PRACTITIONER

## 2023-04-13 PROCEDURE — 72131 CT LUMBAR SPINE W/O DYE: CPT

## 2023-04-13 PROCEDURE — 94761 N-INVAS EAR/PLS OXIMETRY MLT: CPT

## 2023-04-13 PROCEDURE — 85025 COMPLETE CBC W/AUTO DIFF WBC: CPT

## 2023-04-13 RX ORDER — SUCRALFATE 1 G/1
1 TABLET ORAL
Qty: 120 TABLET | Refills: 3 | Status: ON HOLD
Start: 2023-04-13 | End: 2023-04-24 | Stop reason: HOSPADM

## 2023-04-13 RX ADMIN — SUCRALFATE 1 G: 1 TABLET ORAL at 11:13

## 2023-04-13 RX ADMIN — APIXABAN 5 MG: 5 TABLET, FILM COATED ORAL at 08:57

## 2023-04-13 RX ADMIN — SUCRALFATE 1 G: 1 TABLET ORAL at 06:51

## 2023-04-13 RX ADMIN — SUCRALFATE 1 G: 1 TABLET ORAL at 17:20

## 2023-04-13 RX ADMIN — PANTOPRAZOLE SODIUM 40 MG: 40 INJECTION, POWDER, FOR SOLUTION INTRAVENOUS at 08:57

## 2023-04-13 RX ADMIN — LEVOTHYROXINE SODIUM 125 MCG: 125 TABLET ORAL at 08:57

## 2023-04-13 ASSESSMENT — PAIN DESCRIPTION - PAIN TYPE: TYPE: CHRONIC PAIN

## 2023-04-13 ASSESSMENT — PAIN SCALES - GENERAL
PAINLEVEL_OUTOF10: 1
PAINLEVEL_OUTOF10: 2

## 2023-04-13 ASSESSMENT — PAIN DESCRIPTION - LOCATION
LOCATION: BACK
LOCATION: OTHER (COMMENT)

## 2023-04-13 ASSESSMENT — PAIN DESCRIPTION - ORIENTATION: ORIENTATION: LOWER

## 2023-04-13 NOTE — PROGRESS NOTES
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 A CT of the abdomen was obtained and showed no acute findings. As of 04/05/2023, his COVID testing was positive. He was re-admitted on 09/11/2023 for nausea and vomiting. He states that he developed syncopal episode along with epigastric discomfort. He was recently treated with doxycycline. Of note, his hemoglobin is 12.9. Recommend:  Carafate 1g QID  Pantoprazole IV 80mg IV and then 40mg IV daily while still admitted. He can be discharged with omeprazole 40mg daily liquid formulation for 30 days. Per hospital OR guidelines, positive COVID without symptoms in non-emergent circumstances necessitates a delay of 2 weeks for procedure scheduling after diagnosis. Per OR management. With symptoms is a 30 day wait.     Letitia Bray MD  [facility] Gastroenterology
[facility]  Inpatient/Observation/Outpatient Rehabilitation    Date: 2023  Patient Name: Mariana Whalen       [] Inpatient Acute/Observation       [x]  Outpatient  : 1950       [] Pt no showed for scheduled appointment    [x] Pt declined therapy at this time due to:  Patient declined evaluation this date d/t fatigue and dizziness. Request to try back tomorrow. [] Pt cancelled due to:  [] No Reason Given   [] Sick/ill   [] Other:    Therapist/Assistant will attempt to see this patient, at our earliest opportunity.        Meron Martinez, OTR/L Date: 2023
[facility]  Inpatient/Observation/Outpatient Rehabilitation    Date: 2023  Patient Name: Arianne Simon       [x] Inpatient Acute/Observation       []  Outpatient  : 1950       [] Pt no showed for scheduled appointment    [x] Pt refused/declined therapy at this time due to:      Pt requesting to hold PT eval until tomorrow stating he is very tired and has a lot of pain. [] Pt cancelled due to:  [] No Reason Given   [] Sick/ill   [] Other:    Therapist/Assistant will attempt to see this patient, at our earliest opportunity.        Yoselyn Vidales, PT, DPT Date: 2023
Occupational Therapy  Facility/Department: [facility] THE [facility] MED SURG  Occupational Therapy Initial Assessment    Name: Yariel Myers  : 1950  MRN: [id number]  Date of Service: 2023    Discharge Recommendations:  Home with assist PRN          Patient Diagnosis(es): The primary encounter diagnosis was Syncope, unspecified syncope type. A diagnosis of Dehydration was also pertinent to this visit. Past Medical History:  has a past medical history of Asthma, BPH (benign prostatic hyperplasia), Calculus of gallbladder without cholecystitis without obstruction, Epigastric pain, Fracture of L1 vertebra ([facility] [address] GERD (gastroesophageal reflux disease), Gout, High altitude pulmonary edema, Hyperlipidemia, Hypertension, Radiculopathy of leg, Sleep apnea, Thyroid disease, and Wrist fracture, left. Past Surgical History:  has a past surgical history that includes Pacemaker insertion; Colon surgery; Colonoscopy; and Colonoscopy (2019). Assessment   Assessment: 68 y/o M admitted to T s/p syncope and collapse. Patient presents at baseline level with selfcare with no acute OT needs. No fruther services required at this time. Prognosis: Good  Decision Making: Medium Complexity  REQUIRES OT FOLLOW-UP: No        Plan   Occupational Therapy Plan  Additional Comments: eval only     Restrictions  Restrictions/Precautions  Restrictions/Precautions: General Precautions    Subjective    Patient sitting EOB upon arrival, agreeable to OT evaluation.      Social/Functional History  Social/Functional History  Lives With: Spouse  Type of Home: House  Home Layout: Two level, Work area in [address] [place] [address] Access: Stairs to enter with rails  Bathroom Shower/Tub: Walk-in shower  Bathroom Toilet: Standard  ADL Assistance: Independent  Homemaking Assistance: Independent  Ambulation Assistance: Independent  Transfer Assistance: Independent       Objective   Heart Rate: 80  Heart Rate Source: Monitor  BP: 120/79  BP Location:
Patient wheeled out to front entrance via wheelchair and left in private auto.
Pharmacy Note    Kimberly Oakley was ordered Ipratropium 0.03% nasal spray. Per the [address], this medication is non-formulary and not stocked by pharmacy for the reason indicated below. The medication can be reordered at discharge.      Medications in which risks outweigh benefits during hospitalization:         -  oral bisphosphonates         -  raloxifene (Evista)      Medications that lack necessity during an acute hospital stay:      -  nasal antihistamines        -  nasal ipratropium 0.03% and 0.06%        -  nasal miacalcin        -  acyclovir topical cream/ointment orders for herpes labialis (cold sores)    Thank you,  Arely Holbrook, [facility]  4/12/2023, 10:27 AM
Progress Note    SUBJECTIVE:    Patient seen for f/u of Syncope. He resting on bedside and feels better. Wants to try to advance diet. Complains of back pain     ROS:   Constitutional: negative  for fevers, and negative for chills. Respiratory: negative for shortness of breath, negative for cough, and negative for wheezing  Cardiovascular: negative for chest pain, and negative for palpitations  Gastrointestinal: negative for abdominal pain, negative for nausea,negative for vomiting, negative for diarrhea, and negative for constipation     All other systems were reviewed with the patient and are negative unless otherwise stated in HPI      OBJECTIVE:      Vitals:   Vitals:    04/13/23 0651   BP: 120/79   Pulse: 80   Resp: 18   Temp: 99.2 °F (37.3 °C)   SpO2: 100%     Weight: 176 lb 5.9 oz (80 kg)   Height: 5' 4\" (162.6 cm)     Weight  Wt Readings from Last 3 Encounters:   04/13/23 176 lb 5.9 oz (80 kg)   04/11/23 175 lb 12.8 oz (79.7 kg)   04/10/23 175 lb 0.7 oz (79.4 kg)     Body mass index is 30.27 kg/m². 24HR INTAKE/OUTPUT:      Intake/Output Summary (Last 24 hours) at 4/13/2023 1011  Last data filed at 4/13/2023 0830  Gross per 24 hour   Intake 640 ml   Output 1950 ml   Net -1310 ml     -----------------------------------------------------------------  Exam:    GEN:    Awake, alert and oriented x3. EYES:  EOMI, pupils equal   NECK: Supple. No lymphadenopathy. No carotid bruit  CVS:    regular rate and rhythm, no audible murmur  PULM:  CTA, no wheezes, rales or rhonchi, no acute respiratory distress  ABD:    Bowels sounds normal.  Abdomen is soft. No distention. no tenderness to palpation. EXT:   no edema bilaterally . No calf tenderness. NEURO: Moves all extremities. Motor and sensory are grossly intact  SKIN:  No rashes.   No skin lesions.    -----------------------------------------------------------------    Diagnostic Data:      Complete Blood Count:   Recent Labs     04/12/23  0555
Pt expressed that he feels warm, oral temp 100.4. pt refused tylenol because he doesn't want the pain he gets after swallowing something.
Pt resting in bed quietly with eyes closed. Respirations even and unlabored. No distress noted. Will continue to monitor.
Pt resting in bed with wife at bedside. Assessment and vital signs as charted. Pt denies pain and is A & O x 4. IV fluids infusing as ordered. Telemetry monitor shows NSR. Pt denies any other needs at this time. Call light in reach. Will continue to monitor.
[facility]  Inpatient/Observation/Outpatient Rehabilitation    Date: 2023  Patient Name: Hannah Wick       [x] Inpatient Acute/Observation       []  Outpatient  : 1950       [] Pt no showed for scheduled appointment    [x] Pt refused/declined therapy at this time due to: Fatigue following CT scan this morning. Requests we come back this afternoon. [] Pt cancelled due to:  [] No Reason Given   [] Sick/ill   [] Other:    Therapist/Assistant will attempt to see this patient, at our earliest opportunity.        Ramiro Metzger, PT, DPT Date: 2023
Writer reviewed discharge instructions with patient and spouse. Patient aware of need to  prescription and aware of follow up appointment. Patient instructed to eat bland diet. Patient and spouse denies questions. Copy of discharge instructions given to patient.
[id number]
Independent  Transfers  Sit to Stand: Independent  Stand to Sit: Independent  Ambulation  Surface: Level tile  Device: No Device  Assistance: Independent  Distance: Pt amb 50ft with no AD, IND with no LOB or fatigue     Balance  Sitting - Static: Good  Sitting - Dynamic: Good  Standing - Static: Good  Standing - Dynamic: Good  Exercise Treatment: HEP: upper trap/lev scap stretches and cervical flexion stretch; supine PPT's, marching, LTR, glut sets to improve core strength        AM-PAC Score     AM-PAC Inpatient Mobility without Stair Climbing Raw Score : 20 (04/13/23 1456)  AM-PAC Inpatient without Stair Climbing T-Scale Score : 60.57 (04/13/23 1456)  Mobility Inpatient CMS 0-100% Score: 0 (04/13/23 1456)  Mobility Inpatient without Stair CMS G-Code Modifier : CH (04/13/23 1456)       Goals  Short Term Goals  Time Frame for Short Term Goals: 20 days  Short Term Goal 1: Patient to be able to ascend/descend a full flight of stairs with HRx1 and no LOB to navigate his home. Short Term Goal 2: Patient to initiate and review HEP for improved core strength and CROM to decrease pain. Short Term Goal 3: Patient to be able to ambulate 984amx6 with no AD, IND with no LOB or increase in pain for improved mobility.        Education  Patient Education  Education Given To: Patient  Education Provided: Role of Therapy;Plan of Care;Home Exercise Program  Education Method: Verbal;Printed Information/Hand-outs  Barriers to Learning: None  Education Outcome: Verbalized understanding      Therapy Time   Individual Concurrent Group Co-treatment   Time In 1401         Time Out 1415         Minutes 14         Timed Code Treatment Minutes: [address] Anurag Eldridge, PT, DPT

## 2023-04-13 NOTE — DISCHARGE SUMMARY
Date: 4/13/2023  EXAMINATION: CT OF THE LUMBAR SPINE WITHOUT CONTRAST  4/13/2023 TECHNIQUE: CT of the lumbar spine was performed without the administration of intravenous contrast. Multiplanar reformatted images are provided for review. Adjustment of mA and/or kV according to patient size was utilized. Automated exposure control, iterative reconstruction, and/or weight based adjustment of the mA/kV was utilized to reduce the radiation dose to as low as reasonably achievable. COMPARISON: Lumbar spine 21 June 2018 HISTORY: ORDERING SYSTEM PROVIDED HISTORY: back pain, fall TECHNOLOGIST PROVIDED HISTORY: back pain, fall FINDINGS: BONES/ALIGNMENT: There is normal alignment of the spine. The vertebral body heights are maintained. No osseous destructive lesion is seen. Chronic anterior loss in height L1 is noted similar to the prior lumbar spine films in 2018. DEGENERATIVE CHANGES: Diffuse mild to moderate degenerative and degenerative disc changes. Disc protrusions at L3-L4 through L5-S1. Facet arthropathy of lower lumbar area is noted with bilateral neural foraminal narrowing L4-L5 L5-S1. Exiting L5-S1 nerve roots are elevated and borderline impinged along the dorsal aspect due to spondylosis and facet arthropathy. Mild to moderate canal stenosis L4-L5 is noted due to facet arthropathy and disc protrusion. Schmorl's nodes are present superior endplates of W70, L1 and L4 and inferior endplates of L1, L3 and L4. SOFT TISSUES/RETROPERITONEUM: No paraspinal mass is seen. No acute fracture or traumatic malalignment. Multilevel degenerative and degenerative disc disease with disc protrusions L3-L4 through L5-S1 and moderate canal stenosis L4-L5. Exiting L5-S1 nerve roots are elevated and borderline impinged.        Assessment and Plan:  Patient Active Problem List    Diagnosis Date Noted    Type 2 diabetes mellitus 03/15/2023    Elevated liver enzymes     Nausea     Chronic diastolic CHF (congestive heart failure),

## 2023-04-13 NOTE — DISCHARGE INSTR - DIET

## 2023-04-13 NOTE — PLAN OF CARE
Problem: Pain  Goal: Verbalizes/displays adequate comfort level or baseline comfort level  Outcome: Progressing  Flowsheets (Taken 4/13/2023 0229)  Verbalizes/displays adequate comfort level or baseline comfort level:   Assess pain using appropriate pain scale   Encourage patient to monitor pain and request assistance  Note: Pt denies pain at this time. Will continue to assess. Problem: Safety - Adult  Goal: Free from fall injury  Outcome: Progressing  Flowsheets (Taken 4/13/2023 0229)  Free From Fall Injury: Instruct family/caregiver on patient safety  Note: Pt is at high risk for falls. Non skid socks on. Bed in low position and wheels locked. Fall sign posted and bracelet on. Siderails up x 2. Pt is A & O x 4. Call light within reach. Will continue to assess. Problem: Cardiovascular - Adult  Goal: Absence of cardiac dysrhythmias or at baseline  Outcome: Progressing  Flowsheets (Taken 4/13/2023 0229)  Absence of cardiac dysrhythmias or at baseline:   Monitor cardiac rate and rhythm   Assess for signs of decreased cardiac output  Note: Cardiac monitor on. VS stable and WNL. Will continue to assess. Problem: Infection - Adult  Goal: Absence of infection during hospitalization  Outcome: Progressing  Flowsheets (Taken 4/13/2023 0229)  Absence of infection during hospitalization:   Assess and monitor for signs and symptoms of infection   Monitor lab/diagnostic results   Monitor all insertion sites i.e., indwelling lines, tubes and drains  Note: No temperature noted. VS stable and WNL. Will continue to assess. Problem: Metabolic/Fluid and Electrolytes - Adult  Goal: Electrolytes maintained within normal limits  Outcome: Progressing  Flowsheets (Taken 4/13/2023 0229)  Electrolytes maintained within normal limits: Monitor labs and assess patient for signs and symptoms of electrolyte imbalances  Note: IV fluids infusing. Oral fluids encouraged. Urine output measured. I & O monitored. Daily weights.

## 2023-04-13 NOTE — PLAN OF CARE
Problem: Discharge Planning  Goal: Discharge to home or other facility with appropriate resources  Outcome: Completed     Problem: Pain  Goal: Verbalizes/displays adequate comfort level or baseline comfort level  Outcome: Completed     Problem: Safety - Adult  Goal: Free from fall injury  Outcome: Completed     Problem: Cardiovascular - Adult  Goal: Absence of cardiac dysrhythmias or at baseline  Outcome: Completed     Problem: Musculoskeletal - Adult  Goal: Return mobility to safest level of function  Outcome: Completed  Goal: Return ADL status to a safe level of function  Outcome: Completed     Problem: Infection - Adult  Goal: Absence of infection during hospitalization  Outcome: Completed     Problem: Metabolic/Fluid and Electrolytes - Adult  Goal: Electrolytes maintained within normal limits  Outcome: Completed     Problem: Nutrition Deficit:  Goal: Optimize nutritional status  4/13/2023 1652 by Diana Salazar RN  Outcome: Completed  4/13/2023 0800 by Aj Hammonds RD, LD  Outcome: Progressing  Flowsheets (Taken 4/13/2023 0800)  Nutrient intake appropriate for improving, restoring, or maintaining nutritional needs:   Monitor oral intake, labs, and treatment plans   Recommend appropriate diets, oral nutritional supplements, and vitamin/mineral supplements  Note: Nutrition Problem #1: Other (Comment) (mild malnutrition)  Intervention: Food and/or Nutrient Delivery: Modify Current Diet, Start Oral Nutrition Supplement (advance to GI soft / PUD)       Problem: Chronic Conditions and Co-morbidities  Goal: Patient's chronic conditions and co-morbidity symptoms are monitored and maintained or improved  Outcome: Completed

## 2023-04-14 LAB
MICROORGANISM SPEC CULT: NORMAL
MICROORGANISM SPEC CULT: NORMAL
SERVICE CMNT-IMP: NORMAL
SERVICE CMNT-IMP: NORMAL
SPECIMEN DESCRIPTION: NORMAL
SPECIMEN DESCRIPTION: NORMAL

## 2023-04-18 ENCOUNTER — HOSPITAL ENCOUNTER (OUTPATIENT)
Dept: VASCULAR LAB | Age: 73
Discharge: HOME | End: 2023-04-20
Payer: MEDICARE

## 2023-04-18 ENCOUNTER — HOSPITAL ENCOUNTER (OUTPATIENT)
Age: 73
Discharge: HOME | End: 2023-04-18
Payer: MEDICARE

## 2023-04-18 DIAGNOSIS — D61.818 PANCYTOPENIA (HCC): ICD-10-CM

## 2023-04-18 DIAGNOSIS — I82.4Y1 ACUTE DEEP VEIN THROMBOSIS (DVT) OF PROXIMAL VEIN OF RIGHT LOWER EXTREMITY (HCC): Primary | ICD-10-CM

## 2023-04-18 DIAGNOSIS — I82.4Y1 ACUTE DEEP VEIN THROMBOSIS (DVT) OF PROXIMAL VEIN OF RIGHT LOWER EXTREMITY (HCC): ICD-10-CM

## 2023-04-18 DIAGNOSIS — I50.32 CHRONIC DIASTOLIC CHF (CONGESTIVE HEART FAILURE), NYHA CLASS 2 (HCC): ICD-10-CM

## 2023-04-18 LAB
ABSOLUTE EOS #: 0.13 K/UL (ref 0–0.44)
ABSOLUTE IMMATURE GRANULOCYTE: 0.06 K/UL (ref 0–0.3)
ABSOLUTE LYMPH #: 1.95 K/UL (ref 1.1–3.7)
ABSOLUTE MONO #: 0.57 K/UL (ref 0.1–1.2)
ALBUMIN SERPL-MCNC: 3 G/DL (ref 3.5–5.2)
ALBUMIN/GLOBULIN RATIO: 0.6 (ref 1–2.5)
ALP SERPL-CCNC: 66 U/L (ref 40–129)
ALT SERPL-CCNC: 65 U/L (ref 5–41)
ANION GAP SERPL CALCULATED.3IONS-SCNC: 6 MMOL/L (ref 9–17)
AST SERPL-CCNC: 41 U/L
BASOPHILS # BLD: 0 % (ref 0–2)
BASOPHILS ABSOLUTE: 0 K/UL (ref 0–0.2)
BILIRUB SERPL-MCNC: 0.8 MG/DL (ref 0.3–1.2)
BUN SERPL-MCNC: 16 MG/DL (ref 8–23)
BUN/CREAT BLD: 12 (ref 9–20)
CALCIUM SERPL-MCNC: 9.3 MG/DL (ref 8.6–10.4)
CHLORIDE SERPL-SCNC: 97 MMOL/L (ref 98–107)
CO2 SERPL-SCNC: 29 MMOL/L (ref 20–31)
CREAT SERPL-MCNC: 1.35 MG/DL (ref 0.7–1.2)
EOSINOPHILS RELATIVE PERCENT: 2 % (ref 1–4)
GFR SERPL CREATININE-BSD FRML MDRD: 56 ML/MIN/1.73M2
GLUCOSE SERPL-MCNC: 92 MG/DL (ref 70–99)
HCT VFR BLD AUTO: 38.6 % (ref 40.7–50.3)
HGB BLD-MCNC: 13 G/DL (ref 13–17)
IMMATURE GRANULOCYTES: 1 %
LYMPHOCYTES # BLD: 31 % (ref 24–43)
MCH RBC QN AUTO: 30.6 PG (ref 25.2–33.5)
MCHC RBC AUTO-ENTMCNC: 33.7 G/DL (ref 28.4–34.8)
MCV RBC AUTO: 90.8 FL (ref 82.6–102.9)
MONOCYTES # BLD: 9 % (ref 3–12)
MORPHOLOGY: ABNORMAL
NRBC AUTOMATED: 0 PER 100 WBC
PDW BLD-RTO: 13.2 % (ref 11.8–14.4)
PLATELET # BLD AUTO: ABNORMAL K/UL (ref 138–453)
PLATELET, FLUORESCENCE: 107 K/UL (ref 138–453)
PLATELET, IMMATURE FRACTION: 2.4 % (ref 1.1–10.3)
POTASSIUM SERPL-SCNC: 5 MMOL/L (ref 3.7–5.3)
PROT SERPL-MCNC: 8.3 G/DL (ref 6.4–8.3)
RBC # BLD: 4.25 M/UL (ref 4.21–5.77)
SEG NEUTROPHILS: 57 % (ref 36–65)
SEGMENTED NEUTROPHILS ABSOLUTE COUNT: 3.59 K/UL (ref 1.5–8.1)
SODIUM SERPL-SCNC: 132 MMOL/L (ref 135–144)
WBC # BLD AUTO: 6.3 K/UL (ref 3.5–11.3)

## 2023-04-18 PROCEDURE — 80053 COMPREHEN METABOLIC PANEL: CPT

## 2023-04-18 PROCEDURE — 93971 EXTREMITY STUDY: CPT

## 2023-04-18 PROCEDURE — 85025 COMPLETE CBC W/AUTO DIFF WBC: CPT

## 2023-04-18 PROCEDURE — 36415 COLL VENOUS BLD VENIPUNCTURE: CPT

## 2023-04-19 ENCOUNTER — OFFICE VISIT (OUTPATIENT)
Dept: CARDIOLOGY | Age: 73
End: 2023-04-19
Payer: MEDICARE

## 2023-04-19 VITALS
HEART RATE: 74 BPM | BODY MASS INDEX: 29.16 KG/M2 | HEIGHT: 64 IN | OXYGEN SATURATION: 99 % | WEIGHT: 170.8 LBS | RESPIRATION RATE: 20 BRPM | SYSTOLIC BLOOD PRESSURE: 133 MMHG | DIASTOLIC BLOOD PRESSURE: 84 MMHG

## 2023-04-19 DIAGNOSIS — T46.2X5A PILL ESOPHAGITIS DUE TO TETRACYCLINE: ICD-10-CM

## 2023-04-19 DIAGNOSIS — Z95.0 CARDIAC PACEMAKER IN SITU: ICD-10-CM

## 2023-04-19 DIAGNOSIS — Z79.01 ENCOUNTER FOR CURRENT LONG-TERM USE OF ANTICOAGULANTS: ICD-10-CM

## 2023-04-19 DIAGNOSIS — I10 PRIMARY HYPERTENSION: ICD-10-CM

## 2023-04-19 DIAGNOSIS — R42 LIGHTHEADED: ICD-10-CM

## 2023-04-19 DIAGNOSIS — Z79.01 CHRONIC ANTICOAGULATION: ICD-10-CM

## 2023-04-19 DIAGNOSIS — I48.0 PAF (PAROXYSMAL ATRIAL FIBRILLATION) (HCC): Primary | ICD-10-CM

## 2023-04-19 DIAGNOSIS — E78.2 MIXED HYPERLIPIDEMIA: ICD-10-CM

## 2023-04-19 DIAGNOSIS — E86.0 DEHYDRATION, MILD: ICD-10-CM

## 2023-04-19 DIAGNOSIS — I50.32 CHRONIC DIASTOLIC CONGESTIVE HEART FAILURE (HCC): ICD-10-CM

## 2023-04-19 DIAGNOSIS — K20.80 PILL ESOPHAGITIS DUE TO TETRACYCLINE: ICD-10-CM

## 2023-04-19 PROCEDURE — 1036F TOBACCO NON-USER: CPT | Performed by: FAMILY MEDICINE

## 2023-04-19 PROCEDURE — 99215 OFFICE O/P EST HI 40 MIN: CPT | Performed by: FAMILY MEDICINE

## 2023-04-19 PROCEDURE — 3075F SYST BP GE 130 - 139MM HG: CPT | Performed by: FAMILY MEDICINE

## 2023-04-19 PROCEDURE — G8417 CALC BMI ABV UP PARAM F/U: HCPCS | Performed by: FAMILY MEDICINE

## 2023-04-19 PROCEDURE — 3017F COLORECTAL CA SCREEN DOC REV: CPT | Performed by: FAMILY MEDICINE

## 2023-04-19 PROCEDURE — 1111F DSCHRG MED/CURRENT MED MERGE: CPT | Performed by: FAMILY MEDICINE

## 2023-04-19 PROCEDURE — 1123F ACP DISCUSS/DSCN MKR DOCD: CPT | Performed by: FAMILY MEDICINE

## 2023-04-19 PROCEDURE — G8427 DOCREV CUR MEDS BY ELIG CLIN: HCPCS | Performed by: FAMILY MEDICINE

## 2023-04-19 PROCEDURE — 3079F DIAST BP 80-89 MM HG: CPT | Performed by: FAMILY MEDICINE

## 2023-04-19 RX ORDER — ONDANSETRON 4 MG/1
4 TABLET, ORALLY DISINTEGRATING ORAL 3 TIMES DAILY PRN
Qty: 21 TABLET | Refills: 0 | Status: ON HOLD | OUTPATIENT
Start: 2023-04-19

## 2023-04-19 RX ORDER — OMEPRAZOLE 40 MG/1
40 CAPSULE, DELAYED RELEASE ORAL 2 TIMES DAILY
Status: ON HOLD | COMMUNITY

## 2023-04-19 NOTE — PATIENT INSTRUCTIONS
SURVEY:    You may be receiving a survey from [employer] regarding your visit today. Please complete the survey to enable us to provide the highest quality of care to you and your family. If you cannot score us a very good on any question, please call the office to discuss how we could have made your experience a very good one. Thank you.

## 2023-04-19 NOTE — PROGRESS NOTES
Procedure Laterality Date    COLON SURGERY      perirectal abcess    COLONOSCOPY          COLONOSCOPY  2019        PACEMAKER INSERTION      Social History:  Social History     Tobacco Use    Smoking status: Former     Packs/day: 0.50     Years: 8.00     Pack years: 4.00     Types: Cigarettes     Quit date: 1981     Years since quittin.4    Smokeless tobacco: Former   Vaping Use    Vaping Use: Never used   Substance Use Topics    Alcohol use:  Yes     Alcohol/week: 1.0 standard drink     Types: 1 Glasses of wine per week     Comment: occ    Drug use: Never        CURRENT MEDICATIONS:        Outpatient Medications Marked as Taking for the 23 encounter (Office Visit) with Lilian Mckeon MD   Medication Sig Dispense Refill    omeprazole (PRILOSEC) 40 MG delayed release capsule Take 1 capsule by mouth in the morning and at bedtime      sucralfate (CARAFATE) 1 GM tablet Take 1 tablet by mouth 4 times daily (before meals and nightly) (Patient taking differently: Take 1 tablet by mouth in the morning and at bedtime) 120 tablet 3    tamsulosin (FLOMAX) 0.4 MG capsule Take 1 capsule by mouth daily 90 capsule 3    levothyroxine (SYNTHROID) 125 MCG tablet Take 1 tablet by mouth Daily 90 tablet 3    ipratropium (ATROVENT) 0.03 % nasal spray 2 sprays by Nasal route 2 times daily as needed for Rhinitis 1 each 2    finasteride (PROSCAR) 5 MG tablet Take 1 tablet by mouth daily 90 tablet 3    atorvastatin (LIPITOR) 10 MG tablet Take 1 tablet by mouth daily 90 tablet 3    allopurinol (ZYLOPRIM) 300 MG tablet Take 1 tablet by mouth daily 90 tablet 3    furosemide (LASIX) 20 MG tablet Take 1 tablet by mouth daily as needed (leg swelling) (Patient taking differently: Take 1 tablet by mouth as needed (leg swelling)) 5 tablet 0    apixaban (ELIQUIS) 5 MG TABS tablet Take 1 tablet by mouth 2 times daily Indications: Atrial Fibrillation      fluticasone (ARNUITY ELLIPTA) 100 MCG/ACT AEPB Inhale 1

## 2023-04-20 ENCOUNTER — HOSPITAL ENCOUNTER (INPATIENT)
Age: 73
LOS: 4 days | Discharge: HOME | DRG: 683 | End: 2023-04-24
Attending: STUDENT IN AN ORGANIZED HEALTH CARE EDUCATION/TRAINING PROGRAM | Admitting: STUDENT IN AN ORGANIZED HEALTH CARE EDUCATION/TRAINING PROGRAM
Payer: MEDICARE

## 2023-04-20 ENCOUNTER — APPOINTMENT (OUTPATIENT)
Dept: GENERAL RADIOLOGY | Age: 73
DRG: 683 | End: 2023-04-20
Attending: STUDENT IN AN ORGANIZED HEALTH CARE EDUCATION/TRAINING PROGRAM
Payer: MEDICARE

## 2023-04-20 ENCOUNTER — OFFICE VISIT (OUTPATIENT)
Dept: PRIMARY CARE CLINIC | Age: 73
End: 2023-04-20

## 2023-04-20 ENCOUNTER — HOSPITAL ENCOUNTER (OUTPATIENT)
Age: 73
Discharge: HOME | End: 2023-04-20
Payer: MEDICARE

## 2023-04-20 ENCOUNTER — APPOINTMENT (OUTPATIENT)
Dept: ULTRASOUND IMAGING | Age: 73
DRG: 683 | End: 2023-04-20
Attending: STUDENT IN AN ORGANIZED HEALTH CARE EDUCATION/TRAINING PROGRAM
Payer: MEDICARE

## 2023-04-20 ENCOUNTER — TELEPHONE (OUTPATIENT)
Dept: CARDIOLOGY | Age: 73
End: 2023-04-20

## 2023-04-20 VITALS
BODY MASS INDEX: 29.3 KG/M2 | RESPIRATION RATE: 18 BRPM | TEMPERATURE: 100.4 F | HEART RATE: 56 BPM | SYSTOLIC BLOOD PRESSURE: 138 MMHG | DIASTOLIC BLOOD PRESSURE: 86 MMHG | HEIGHT: 64 IN | OXYGEN SATURATION: 99 % | WEIGHT: 171.6 LBS

## 2023-04-20 DIAGNOSIS — U07.1 COVID-19: ICD-10-CM

## 2023-04-20 DIAGNOSIS — T39.1X5A: Primary | ICD-10-CM

## 2023-04-20 DIAGNOSIS — D72.810 LYMPHOCYTOPENIA: ICD-10-CM

## 2023-04-20 DIAGNOSIS — M79.18 MYALGIA, LOWER LEG: Primary | ICD-10-CM

## 2023-04-20 DIAGNOSIS — M79.10 MYALGIA DUE TO VIRAL INFECTION: Primary | ICD-10-CM

## 2023-04-20 DIAGNOSIS — Z09 HOSPITAL DISCHARGE FOLLOW-UP: Primary | ICD-10-CM

## 2023-04-20 DIAGNOSIS — B97.89 MYALGIA DUE TO VIRAL INFECTION: Primary | ICD-10-CM

## 2023-04-20 DIAGNOSIS — N19 PRERENAL RENAL FAILURE: ICD-10-CM

## 2023-04-20 DIAGNOSIS — D69.6 THROMBOCYTOPENIA (HCC): ICD-10-CM

## 2023-04-20 DIAGNOSIS — N17.9 AKI (ACUTE KIDNEY INJURY) (HCC): ICD-10-CM

## 2023-04-20 DIAGNOSIS — T39.1X5A: ICD-10-CM

## 2023-04-20 LAB
ABSOLUTE EOS #: 0 K/UL (ref 0–0.44)
ABSOLUTE IMMATURE GRANULOCYTE: 0 K/UL (ref 0–0.3)
ABSOLUTE LYMPH #: 1.33 K/UL (ref 1.1–3.7)
ABSOLUTE MONO #: 0.87 K/UL (ref 0.1–1.2)
ACETAMINOPHEN LEVEL: <5 UG/ML (ref 10–30)
ALBUMIN SERPL-MCNC: 2.9 G/DL (ref 3.5–5.2)
ALBUMIN/GLOBULIN RATIO: 0.5 (ref 1–2.5)
ALP SERPL-CCNC: 60 U/L (ref 40–129)
ALT SERPL-CCNC: 58 U/L (ref 5–41)
ANION GAP SERPL CALCULATED.3IONS-SCNC: 6 MMOL/L (ref 9–17)
AST SERPL-CCNC: 29 U/L
ATYPICAL LYMPHOCYTE ABSOLUTE COUNT: 0.7 K/UL
ATYPICAL LYMPHOCYTES: 12 %
BASOPHILS # BLD: 0 % (ref 0–2)
BASOPHILS ABSOLUTE: 0 K/UL (ref 0–0.2)
BILIRUB SERPL-MCNC: 0.9 MG/DL (ref 0.3–1.2)
BILIRUBIN URINE: NEGATIVE
BUN SERPL-MCNC: 19 MG/DL (ref 8–23)
BUN/CREAT BLD: 11 (ref 9–20)
CALCIUM SERPL-MCNC: 8.7 MG/DL (ref 8.6–10.4)
CHLORIDE SERPL-SCNC: 98 MMOL/L (ref 98–107)
CHLORIDE, UR: 26 MMOL/L
CK SERPL-CCNC: 45 U/L (ref 39–308)
CO2 SERPL-SCNC: 28 MMOL/L (ref 20–31)
COLOR: YELLOW
CREAT SERPL-MCNC: 1.8 MG/DL (ref 0.7–1.2)
CREATININE URINE: 29.8 MG/DL (ref 39–259)
CREATININE URINE: 30.6 MG/DL (ref 39–259)
CRP SERPL HS-MCNC: 51.2 MG/L (ref 0–5)
EOSINOPHIL,URINE: NORMAL
EOSINOPHILS RELATIVE PERCENT: 0 % (ref 1–4)
EPITHELIAL CELLS UA: NORMAL /HPF (ref 0–5)
GFR SERPL CREATININE-BSD FRML MDRD: 39 ML/MIN/1.73M2
GLUCOSE SERPL-MCNC: 88 MG/DL (ref 70–99)
GLUCOSE UR STRIP.AUTO-MCNC: NEGATIVE MG/DL
HCT VFR BLD AUTO: 34.1 % (ref 40.7–50.3)
HGB BLD-MCNC: 11.6 G/DL (ref 13–17)
IMMATURE GRANULOCYTES: 0 %
KETONES UR STRIP.AUTO-MCNC: NEGATIVE MG/DL
LEUKOCYTE ESTERASE UR QL STRIP.AUTO: NEGATIVE
LYMPHOCYTES # BLD: 23 % (ref 24–43)
MAGNESIUM SERPL-MCNC: 2.1 MG/DL (ref 1.6–2.6)
MCH RBC QN AUTO: 31.2 PG (ref 25.2–33.5)
MCHC RBC AUTO-ENTMCNC: 34 G/DL (ref 28.4–34.8)
MCV RBC AUTO: 91.7 FL (ref 82.6–102.9)
MONOCYTES # BLD: 15 % (ref 3–12)
MORPHOLOGY: NORMAL
NITRITE UR QL STRIP.AUTO: NEGATIVE
NRBC AUTOMATED: 0 PER 100 WBC
PDW BLD-RTO: 13 % (ref 11.8–14.4)
PHOSPHATE SERPL-MCNC: 4.4 MG/DL (ref 2.5–4.5)
PLATELET # BLD AUTO: 112 K/UL (ref 138–453)
PMV BLD AUTO: 9.4 FL (ref 8.1–13.5)
POTASSIUM SERPL-SCNC: 4.5 MMOL/L (ref 3.7–5.3)
POTASSIUM, UR: 20 MMOL/L
PROT SERPL-MCNC: 8.2 G/DL (ref 6.4–8.3)
PROT UR STRIP.AUTO-MCNC: 7.5 MG/DL (ref 5–9)
PROT UR STRIP.AUTO-MCNC: NEGATIVE MG/DL
RBC # BLD: 3.72 M/UL (ref 4.21–5.77)
RBC CLUMPS #/AREA URNS AUTO: NORMAL /HPF (ref 0–2)
SEG NEUTROPHILS: 50 % (ref 36–65)
SEGMENTED NEUTROPHILS ABSOLUTE COUNT: 2.9 K/UL (ref 1.5–8.1)
SODIUM SERPL-SCNC: 132 MMOL/L (ref 135–144)
SODIUM,UR: 32 MMOL/L
SPECIFIC GRAVITY UA: 1.01 (ref 1.01–1.02)
TOTAL PROTEIN, URINE: 18 MG/DL
TROPONIN I SERPL DL<=0.01 NG/ML-MCNC: 15 NG/L (ref 0–22)
TROPONIN I SERPL DL<=0.01 NG/ML-MCNC: 15 NG/L (ref 0–22)
TURBIDITY: CLEAR
URATE SERPL-MCNC: 9 MG/DL (ref 3.4–7)
URINE HGB: ABNORMAL
URINE TOTAL PROTEIN CREATININE RATIO: 0.6 (ref 0–0.2)
UROBILINOGEN, URINE: NORMAL
WBC # BLD AUTO: 5.8 K/UL (ref 3.5–11.3)
WBC UA: NORMAL /HPF (ref 0–5)

## 2023-04-20 PROCEDURE — 36415 COLL VENOUS BLD VENIPUNCTURE: CPT

## 2023-04-20 PROCEDURE — 84300 ASSAY OF URINE SODIUM: CPT

## 2023-04-20 PROCEDURE — 84133 ASSAY OF URINE POTASSIUM: CPT

## 2023-04-20 PROCEDURE — 82570 ASSAY OF URINE CREATININE: CPT

## 2023-04-20 PROCEDURE — 82550 ASSAY OF CK (CPK): CPT

## 2023-04-20 PROCEDURE — 94761 N-INVAS EAR/PLS OXIMETRY MLT: CPT

## 2023-04-20 PROCEDURE — 84550 ASSAY OF BLOOD/URIC ACID: CPT

## 2023-04-20 PROCEDURE — 6370000000 HC RX 637 (ALT 250 FOR IP): Performed by: STUDENT IN AN ORGANIZED HEALTH CARE EDUCATION/TRAINING PROGRAM

## 2023-04-20 PROCEDURE — 51798 US URINE CAPACITY MEASURE: CPT

## 2023-04-20 PROCEDURE — 84100 ASSAY OF PHOSPHORUS: CPT

## 2023-04-20 PROCEDURE — 84484 ASSAY OF TROPONIN QUANT: CPT

## 2023-04-20 PROCEDURE — 83874 ASSAY OF MYOGLOBIN: CPT

## 2023-04-20 PROCEDURE — 93005 ELECTROCARDIOGRAM TRACING: CPT | Performed by: NURSE PRACTITIONER

## 2023-04-20 PROCEDURE — 84156 ASSAY OF PROTEIN URINE: CPT

## 2023-04-20 PROCEDURE — 1200000000 HC SEMI PRIVATE

## 2023-04-20 PROCEDURE — 86140 C-REACTIVE PROTEIN: CPT

## 2023-04-20 PROCEDURE — 71046 X-RAY EXAM CHEST 2 VIEWS: CPT

## 2023-04-20 PROCEDURE — 83735 ASSAY OF MAGNESIUM: CPT

## 2023-04-20 PROCEDURE — 87040 BLOOD CULTURE FOR BACTERIA: CPT

## 2023-04-20 PROCEDURE — 82436 ASSAY OF URINE CHLORIDE: CPT

## 2023-04-20 PROCEDURE — 81001 URINALYSIS AUTO W/SCOPE: CPT

## 2023-04-20 PROCEDURE — 2580000003 HC RX 258: Performed by: NURSE PRACTITIONER

## 2023-04-20 PROCEDURE — 85025 COMPLETE CBC W/AUTO DIFF WBC: CPT

## 2023-04-20 PROCEDURE — 6370000000 HC RX 637 (ALT 250 FOR IP): Performed by: NURSE PRACTITIONER

## 2023-04-20 PROCEDURE — 80053 COMPREHEN METABOLIC PANEL: CPT

## 2023-04-20 PROCEDURE — 87205 SMEAR GRAM STAIN: CPT

## 2023-04-20 PROCEDURE — 83970 ASSAY OF PARATHORMONE: CPT

## 2023-04-20 PROCEDURE — 80143 DRUG ASSAY ACETAMINOPHEN: CPT

## 2023-04-20 PROCEDURE — 76770 US EXAM ABDO BACK WALL COMP: CPT

## 2023-04-20 PROCEDURE — 94664 DEMO&/EVAL PT USE INHALER: CPT

## 2023-04-20 PROCEDURE — 74019 RADEX ABDOMEN 2 VIEWS: CPT

## 2023-04-20 RX ORDER — FINASTERIDE 5 MG/1
5 TABLET, FILM COATED ORAL DAILY
Status: DISCONTINUED | OUTPATIENT
Start: 2023-04-20 | End: 2023-04-20

## 2023-04-20 RX ORDER — ALLOPURINOL 300 MG/1
300 TABLET ORAL DAILY
Status: DISCONTINUED | OUTPATIENT
Start: 2023-04-21 | End: 2023-04-20

## 2023-04-20 RX ORDER — SODIUM CHLORIDE 9 MG/ML
INJECTION, SOLUTION INTRAVENOUS PRN
Status: DISCONTINUED | OUTPATIENT
Start: 2023-04-20 | End: 2023-04-24 | Stop reason: HOSPADM

## 2023-04-20 RX ORDER — LEVOTHYROXINE SODIUM 0.12 MG/1
125 TABLET ORAL DAILY
Status: DISCONTINUED | OUTPATIENT
Start: 2023-04-21 | End: 2023-04-24 | Stop reason: HOSPADM

## 2023-04-20 RX ORDER — ONDANSETRON 4 MG/1
4 TABLET, ORALLY DISINTEGRATING ORAL EVERY 8 HOURS PRN
Status: DISCONTINUED | OUTPATIENT
Start: 2023-04-20 | End: 2023-04-24 | Stop reason: HOSPADM

## 2023-04-20 RX ORDER — 0.9 % SODIUM CHLORIDE 0.9 %
250 INTRAVENOUS SOLUTION INTRAVENOUS ONCE
Status: COMPLETED | OUTPATIENT
Start: 2023-04-20 | End: 2023-04-20

## 2023-04-20 RX ORDER — IPRATROPIUM BROMIDE 21 UG/1
2 SPRAY, METERED NASAL 2 TIMES DAILY PRN
Status: DISCONTINUED | OUTPATIENT
Start: 2023-04-20 | End: 2023-04-20 | Stop reason: RX

## 2023-04-20 RX ORDER — TAMSULOSIN HYDROCHLORIDE 0.4 MG/1
0.4 CAPSULE ORAL DAILY
Status: DISCONTINUED | OUTPATIENT
Start: 2023-04-20 | End: 2023-04-20

## 2023-04-20 RX ORDER — SUCRALFATE 1 G/1
1 TABLET ORAL
Status: DISCONTINUED | OUTPATIENT
Start: 2023-04-20 | End: 2023-04-21

## 2023-04-20 RX ORDER — ALBUTEROL SULFATE 90 UG/1
2 AEROSOL, METERED RESPIRATORY (INHALATION) EVERY 6 HOURS PRN
Status: DISCONTINUED | OUTPATIENT
Start: 2023-04-20 | End: 2023-04-24 | Stop reason: HOSPADM

## 2023-04-20 RX ORDER — BUDESONIDE 0.25 MG/2ML
0.25 INHALANT ORAL 2 TIMES DAILY
Status: DISCONTINUED | OUTPATIENT
Start: 2023-04-20 | End: 2023-04-20

## 2023-04-20 RX ORDER — ACETAMINOPHEN 325 MG/1
650 TABLET ORAL EVERY 6 HOURS PRN
Status: DISCONTINUED | OUTPATIENT
Start: 2023-04-20 | End: 2023-04-24 | Stop reason: HOSPADM

## 2023-04-20 RX ORDER — POLYETHYLENE GLYCOL 3350 17 G/17G
17 POWDER, FOR SOLUTION ORAL DAILY PRN
Status: DISCONTINUED | OUTPATIENT
Start: 2023-04-20 | End: 2023-04-20

## 2023-04-20 RX ORDER — VITAMIN B COMPLEX
1000 TABLET ORAL DAILY
Status: DISCONTINUED | OUTPATIENT
Start: 2023-04-20 | End: 2023-04-24 | Stop reason: HOSPADM

## 2023-04-20 RX ORDER — SODIUM CHLORIDE 9 MG/ML
INJECTION, SOLUTION INTRAVENOUS CONTINUOUS
Status: DISCONTINUED | OUTPATIENT
Start: 2023-04-20 | End: 2023-04-21

## 2023-04-20 RX ORDER — PANTOPRAZOLE SODIUM 40 MG/1
40 TABLET, DELAYED RELEASE ORAL
Status: DISCONTINUED | OUTPATIENT
Start: 2023-04-20 | End: 2023-04-24 | Stop reason: HOSPADM

## 2023-04-20 RX ORDER — SODIUM CHLORIDE 0.9 % (FLUSH) 0.9 %
10 SYRINGE (ML) INJECTION PRN
Status: DISCONTINUED | OUTPATIENT
Start: 2023-04-20 | End: 2023-04-24 | Stop reason: HOSPADM

## 2023-04-20 RX ORDER — SODIUM CHLORIDE 0.9 % (FLUSH) 0.9 %
10 SYRINGE (ML) INJECTION EVERY 12 HOURS SCHEDULED
Status: DISCONTINUED | OUTPATIENT
Start: 2023-04-20 | End: 2023-04-24 | Stop reason: HOSPADM

## 2023-04-20 RX ORDER — ACETAMINOPHEN 650 MG/1
650 SUPPOSITORY RECTAL EVERY 6 HOURS PRN
Status: DISCONTINUED | OUTPATIENT
Start: 2023-04-20 | End: 2023-04-24 | Stop reason: HOSPADM

## 2023-04-20 RX ORDER — TAMSULOSIN HYDROCHLORIDE 0.4 MG/1
0.4 CAPSULE ORAL
Status: DISCONTINUED | OUTPATIENT
Start: 2023-04-20 | End: 2023-04-24 | Stop reason: HOSPADM

## 2023-04-20 RX ORDER — ONDANSETRON 2 MG/ML
4 INJECTION INTRAMUSCULAR; INTRAVENOUS EVERY 6 HOURS PRN
Status: DISCONTINUED | OUTPATIENT
Start: 2023-04-20 | End: 2023-04-24 | Stop reason: HOSPADM

## 2023-04-20 RX ORDER — 0.9 % SODIUM CHLORIDE 0.9 %
500 INTRAVENOUS SOLUTION INTRAVENOUS ONCE
Status: DISCONTINUED | OUTPATIENT
Start: 2023-04-20 | End: 2023-04-20

## 2023-04-20 RX ORDER — POLYETHYLENE GLYCOL 3350 17 G/17G
17 POWDER, FOR SOLUTION ORAL 2 TIMES DAILY
Status: DISCONTINUED | OUTPATIENT
Start: 2023-04-20 | End: 2023-04-22

## 2023-04-20 RX ORDER — ALLOPURINOL 100 MG/1
100 TABLET ORAL DAILY
Status: DISCONTINUED | OUTPATIENT
Start: 2023-04-21 | End: 2023-04-24 | Stop reason: HOSPADM

## 2023-04-20 RX ORDER — FINASTERIDE 5 MG/1
5 TABLET, FILM COATED ORAL
Status: DISCONTINUED | OUTPATIENT
Start: 2023-04-20 | End: 2023-04-24 | Stop reason: HOSPADM

## 2023-04-20 RX ADMIN — PANTOPRAZOLE SODIUM 40 MG: 40 TABLET, DELAYED RELEASE ORAL at 16:38

## 2023-04-20 RX ADMIN — TAMSULOSIN HYDROCHLORIDE 0.4 MG: 0.4 CAPSULE ORAL at 21:36

## 2023-04-20 RX ADMIN — Medication 240 ML: at 20:36

## 2023-04-20 RX ADMIN — Medication 1000 UNITS: at 20:36

## 2023-04-20 RX ADMIN — APIXABAN 5 MG: 5 TABLET, FILM COATED ORAL at 20:36

## 2023-04-20 RX ADMIN — SODIUM CHLORIDE 250 ML: 9 INJECTION, SOLUTION INTRAVENOUS at 13:49

## 2023-04-20 RX ADMIN — ACETAMINOPHEN 650 MG: 325 TABLET ORAL at 20:40

## 2023-04-20 RX ADMIN — FINASTERIDE 5 MG: 5 TABLET, FILM COATED ORAL at 21:36

## 2023-04-20 ASSESSMENT — PAIN DESCRIPTION - LOCATION
LOCATION: LEG

## 2023-04-20 ASSESSMENT — PAIN DESCRIPTION - ONSET
ONSET: ON-GOING
ONSET: GRADUAL
ONSET: ON-GOING

## 2023-04-20 ASSESSMENT — PAIN DESCRIPTION - ORIENTATION
ORIENTATION: RIGHT;LEFT

## 2023-04-20 ASSESSMENT — LIFESTYLE VARIABLES: HOW OFTEN DO YOU HAVE A DRINK CONTAINING ALCOHOL: NEVER

## 2023-04-20 ASSESSMENT — PATIENT HEALTH QUESTIONNAIRE - PHQ9
SUM OF ALL RESPONSES TO PHQ9 QUESTIONS 1 & 2: 0
SUM OF ALL RESPONSES TO PHQ QUESTIONS 1-9: 0
SUM OF ALL RESPONSES TO PHQ QUESTIONS 1-9: 0
2. FEELING DOWN, DEPRESSED OR HOPELESS: 0
1. LITTLE INTEREST OR PLEASURE IN DOING THINGS: 0
SUM OF ALL RESPONSES TO PHQ QUESTIONS 1-9: 0
SUM OF ALL RESPONSES TO PHQ QUESTIONS 1-9: 0

## 2023-04-20 ASSESSMENT — PAIN SCALES - WONG BAKER: WONGBAKER_NUMERICALRESPONSE: 0

## 2023-04-20 ASSESSMENT — PAIN - FUNCTIONAL ASSESSMENT
PAIN_FUNCTIONAL_ASSESSMENT: ACTIVITIES ARE NOT PREVENTED

## 2023-04-20 ASSESSMENT — PAIN DESCRIPTION - DESCRIPTORS
DESCRIPTORS: ACHING;DISCOMFORT
DESCRIPTORS: ACHING;SORE
DESCRIPTORS: ACHING;SORE

## 2023-04-20 ASSESSMENT — PAIN SCALES - GENERAL
PAINLEVEL_OUTOF10: 0
PAINLEVEL_OUTOF10: 4
PAINLEVEL_OUTOF10: 4
PAINLEVEL_OUTOF10: 5

## 2023-04-20 ASSESSMENT — PAIN DESCRIPTION - FREQUENCY
FREQUENCY: CONTINUOUS
FREQUENCY: CONTINUOUS
FREQUENCY: INTERMITTENT

## 2023-04-20 ASSESSMENT — PAIN DESCRIPTION - PAIN TYPE
TYPE: ACUTE PAIN
TYPE: ACUTE PAIN

## 2023-04-20 NOTE — PROGRESS NOTES
[address], [address] , [facility], [address]  Follow Up      Jaky Oliveira   YOB: 1950    Date of Office Visit:  4/20/2023  Date of Hospital Admission: 4/12/23  Date of Hospital Discharge: 4/13/23  Risk of hospital readmission (high >=14%. Medium >=10%) :Readmission Risk Score: 18.8    Care management risk score Rising risk (score 2-5) and Complex Care (Scores >=6): No Risk Score On File     Non face to face  following discharge, date last encounter closed (first attempt may have been earlier): 04/14/2023    Call initiated 2 business days of discharge: Yes    ASSESSMENT/PLAN:   Hospital discharge follow-up  -     GA DISCHARGE MEDS RECONCILED W/ CURRENT OUTPATIENT MED LIST  JOEL (acute kidney injury) [facility])  Medical Decision Making: high complexity  Return in 1 week (on 4/27/2023). /pending hospitalization  Given worsening renal function, plan for admission to the hospital today for further w/u, Plan for IVF, labs, Nephrology consultation and further w/u as indicated (e.g. imaging). Hold Lasix for now     Subjective:   HPI:  Follow up of Hospital problems/diagnosis(es):   Inpatient course: Discharge summary reviewed- see chart. Interval history/Current status: Follow up From Hospital  Patient was admitted into the hospital on 4/12/23 and discharged 4/13/23. He was admitted for syncope. Patient reports not feeling well. He reports having covid 2 weeks ago. Patient has had several episodes of syncope. C/o lower extremity pain and urinary frequency that is new for him. He is having multiple pain as well. His creatinine has increased compared to prior. Case had been discussed with  from Cardiology yesterday. He takes in about 2L/day total. He has been taking Colace for about 2/day.     Patient Active Problem List   Diagnosis    PAF (paroxysmal atrial fibrillation) (HCC)    Pericardial effusion with

## 2023-04-21 PROBLEM — D68.69 SECONDARY HYPERCOAGULABILITY DISORDER (HCC): Status: ACTIVE | Noted: 2023-04-21

## 2023-04-21 PROBLEM — N17.0 ACUTE RENAL FAILURE WITH TUBULAR NECROSIS (HCC): Status: ACTIVE | Noted: 2023-04-21

## 2023-04-21 LAB
25(OH)D3 SERPL-MCNC: 16.4 NG/ML
ABSOLUTE EOS #: 0 K/UL (ref 0–0.44)
ABSOLUTE IMMATURE GRANULOCYTE: 0.05 K/UL (ref 0–0.3)
ABSOLUTE LYMPH #: 1.15 K/UL (ref 1.1–3.7)
ABSOLUTE MONO #: 0.48 K/UL (ref 0.1–1.2)
ALBUMIN SERPL-MCNC: 2.4 G/DL (ref 3.5–5.2)
ALBUMIN/GLOBULIN RATIO: 0.5 (ref 1–2.5)
ALP SERPL-CCNC: 54 U/L (ref 40–129)
ALT SERPL-CCNC: 42 U/L (ref 5–41)
ANION GAP SERPL CALCULATED.3IONS-SCNC: 5 MMOL/L (ref 9–17)
ANION GAP SERPL CALCULATED.3IONS-SCNC: 6 MMOL/L (ref 9–17)
ANION GAP SERPL CALCULATED.3IONS-SCNC: 6 MMOL/L (ref 9–17)
AST SERPL-CCNC: 23 U/L
BASOPHILS # BLD: 0 % (ref 0–2)
BASOPHILS ABSOLUTE: 0 K/UL (ref 0–0.2)
BILIRUB SERPL-MCNC: 1.2 MG/DL (ref 0.3–1.2)
BUN SERPL-MCNC: 21 MG/DL (ref 8–23)
BUN SERPL-MCNC: 22 MG/DL (ref 8–23)
BUN SERPL-MCNC: 23 MG/DL (ref 8–23)
BUN/CREAT BLD: 10 (ref 9–20)
BUN/CREAT BLD: 11 (ref 9–20)
BUN/CREAT BLD: 11 (ref 9–20)
CALCIUM SERPL-MCNC: 8 MG/DL (ref 8.6–10.4)
CALCIUM SERPL-MCNC: 8.2 MG/DL (ref 8.6–10.4)
CALCIUM SERPL-MCNC: 8.3 MG/DL (ref 8.6–10.4)
CHLORIDE SERPL-SCNC: 101 MMOL/L (ref 98–107)
CHLORIDE SERPL-SCNC: 104 MMOL/L (ref 98–107)
CHLORIDE SERPL-SCNC: 105 MMOL/L (ref 98–107)
CK SERPL-CCNC: 39 U/L (ref 39–308)
CO2 SERPL-SCNC: 23 MMOL/L (ref 20–31)
CO2 SERPL-SCNC: 24 MMOL/L (ref 20–31)
CO2 SERPL-SCNC: 25 MMOL/L (ref 20–31)
CREAT SERPL-MCNC: 2 MG/DL (ref 0.7–1.2)
CREAT SERPL-MCNC: 2.03 MG/DL (ref 0.7–1.2)
CREAT SERPL-MCNC: 2.19 MG/DL (ref 0.7–1.2)
EKG ATRIAL RATE: 81 BPM
EKG P AXIS: 10 DEGREES
EKG P-R INTERVAL: 166 MS
EKG Q-T INTERVAL: 408 MS
EKG QRS DURATION: 136 MS
EKG QTC CALCULATION (BAZETT): 473 MS
EKG R AXIS: -18 DEGREES
EKG T AXIS: -2 DEGREES
EKG VENTRICULAR RATE: 81 BPM
EOSINOPHILS RELATIVE PERCENT: 0 % (ref 1–4)
GFR SERPL CREATININE-BSD FRML MDRD: 31 ML/MIN/1.73M2
GFR SERPL CREATININE-BSD FRML MDRD: 34 ML/MIN/1.73M2
GFR SERPL CREATININE-BSD FRML MDRD: 35 ML/MIN/1.73M2
GLUCOSE SERPL-MCNC: 86 MG/DL (ref 70–99)
GLUCOSE SERPL-MCNC: 87 MG/DL (ref 70–99)
GLUCOSE SERPL-MCNC: 98 MG/DL (ref 70–99)
HCT VFR BLD AUTO: 31.4 % (ref 40.7–50.3)
HGB BLD-MCNC: 10.7 G/DL (ref 13–17)
IMMATURE GRANULOCYTES: 1 %
LYMPHOCYTES # BLD: 24 % (ref 24–43)
MAGNESIUM SERPL-MCNC: 2.1 MG/DL (ref 1.6–2.6)
MCH RBC QN AUTO: 30.7 PG (ref 25.2–33.5)
MCHC RBC AUTO-ENTMCNC: 34.1 G/DL (ref 28.4–34.8)
MCV RBC AUTO: 90.2 FL (ref 82.6–102.9)
MONOCYTES # BLD: 10 % (ref 3–12)
MORPHOLOGY: ABNORMAL
NRBC AUTOMATED: 0 PER 100 WBC
PDW BLD-RTO: 13 % (ref 11.8–14.4)
PHOSPHATE SERPL-MCNC: 5.1 MG/DL (ref 2.5–4.5)
PLATELET # BLD AUTO: 101 K/UL (ref 138–453)
PMV BLD AUTO: 9.5 FL (ref 8.1–13.5)
POTASSIUM SERPL-SCNC: 4.4 MMOL/L (ref 3.7–5.3)
POTASSIUM SERPL-SCNC: 4.4 MMOL/L (ref 3.7–5.3)
POTASSIUM SERPL-SCNC: 4.9 MMOL/L (ref 3.7–5.3)
PROT SERPL-MCNC: 7.5 G/DL (ref 6.4–8.3)
PTH-INTACT SERPL-MCNC: 28.2 PG/ML (ref 14–72)
RBC # BLD: 3.48 M/UL (ref 4.21–5.77)
SEG NEUTROPHILS: 65 % (ref 36–65)
SEGMENTED NEUTROPHILS ABSOLUTE COUNT: 3.12 K/UL (ref 1.5–8.1)
SODIUM SERPL-SCNC: 131 MMOL/L (ref 135–144)
SODIUM SERPL-SCNC: 134 MMOL/L (ref 135–144)
SODIUM SERPL-SCNC: 134 MMOL/L (ref 135–144)
WBC # BLD AUTO: 4.8 K/UL (ref 3.5–11.3)

## 2023-04-21 PROCEDURE — 84100 ASSAY OF PHOSPHORUS: CPT

## 2023-04-21 PROCEDURE — 36415 COLL VENOUS BLD VENIPUNCTURE: CPT

## 2023-04-21 PROCEDURE — 6370000000 HC RX 637 (ALT 250 FOR IP): Performed by: STUDENT IN AN ORGANIZED HEALTH CARE EDUCATION/TRAINING PROGRAM

## 2023-04-21 PROCEDURE — 82306 VITAMIN D 25 HYDROXY: CPT

## 2023-04-21 PROCEDURE — 80053 COMPREHEN METABOLIC PANEL: CPT

## 2023-04-21 PROCEDURE — 82550 ASSAY OF CK (CPK): CPT

## 2023-04-21 PROCEDURE — 99222 1ST HOSP IP/OBS MODERATE 55: CPT | Performed by: INTERNAL MEDICINE

## 2023-04-21 PROCEDURE — 6370000000 HC RX 637 (ALT 250 FOR IP)

## 2023-04-21 PROCEDURE — 85025 COMPLETE CBC W/AUTO DIFF WBC: CPT

## 2023-04-21 PROCEDURE — 2580000003 HC RX 258: Performed by: NURSE PRACTITIONER

## 2023-04-21 PROCEDURE — 99223 1ST HOSP IP/OBS HIGH 75: CPT | Performed by: FAMILY MEDICINE

## 2023-04-21 PROCEDURE — 6370000000 HC RX 637 (ALT 250 FOR IP): Performed by: NURSE PRACTITIONER

## 2023-04-21 PROCEDURE — 93010 ELECTROCARDIOGRAM REPORT: CPT | Performed by: INTERNAL MEDICINE

## 2023-04-21 PROCEDURE — 94761 N-INVAS EAR/PLS OXIMETRY MLT: CPT

## 2023-04-21 PROCEDURE — 80048 BASIC METABOLIC PNL TOTAL CA: CPT

## 2023-04-21 PROCEDURE — 83735 ASSAY OF MAGNESIUM: CPT

## 2023-04-21 PROCEDURE — 2580000003 HC RX 258: Performed by: INTERNAL MEDICINE

## 2023-04-21 PROCEDURE — 6360000002 HC RX W HCPCS: Performed by: STUDENT IN AN ORGANIZED HEALTH CARE EDUCATION/TRAINING PROGRAM

## 2023-04-21 PROCEDURE — 1200000000 HC SEMI PRIVATE

## 2023-04-21 RX ORDER — SODIUM CHLORIDE 9 MG/ML
INJECTION, SOLUTION INTRAVENOUS CONTINUOUS
Status: DISCONTINUED | OUTPATIENT
Start: 2023-04-21 | End: 2023-04-24 | Stop reason: HOSPADM

## 2023-04-21 RX ORDER — SODIUM CHLORIDE 9 MG/ML
1000 INJECTION, SOLUTION INTRAVENOUS ONCE
Status: DISCONTINUED | OUTPATIENT
Start: 2023-04-21 | End: 2023-04-21

## 2023-04-21 RX ORDER — FAMOTIDINE 10 MG
10 TABLET ORAL DAILY
Status: DISCONTINUED | OUTPATIENT
Start: 2023-04-21 | End: 2023-04-24 | Stop reason: HOSPADM

## 2023-04-21 RX ORDER — SEVELAMER CARBONATE 800 MG/1
800 TABLET, FILM COATED ORAL 2 TIMES DAILY WITH MEALS
Status: DISCONTINUED | OUTPATIENT
Start: 2023-04-21 | End: 2023-04-24 | Stop reason: HOSPADM

## 2023-04-21 RX ORDER — MORPHINE SULFATE 4 MG/ML
4 INJECTION, SOLUTION INTRAMUSCULAR; INTRAVENOUS
Status: DISCONTINUED | OUTPATIENT
Start: 2023-04-21 | End: 2023-04-24 | Stop reason: HOSPADM

## 2023-04-21 RX ORDER — ERGOCALCIFEROL 1.25 MG/1
50000 CAPSULE ORAL WEEKLY
Status: DISCONTINUED | OUTPATIENT
Start: 2023-04-21 | End: 2023-04-24 | Stop reason: HOSPADM

## 2023-04-21 RX ORDER — MORPHINE SULFATE 2 MG/ML
2 INJECTION, SOLUTION INTRAMUSCULAR; INTRAVENOUS
Status: DISCONTINUED | OUTPATIENT
Start: 2023-04-21 | End: 2023-04-24 | Stop reason: HOSPADM

## 2023-04-21 RX ORDER — FAMOTIDINE 20 MG/1
20 TABLET, FILM COATED ORAL 2 TIMES DAILY
Status: DISCONTINUED | OUTPATIENT
Start: 2023-04-21 | End: 2023-04-21

## 2023-04-21 RX ADMIN — FAMOTIDINE 10 MG: 10 TABLET ORAL at 08:20

## 2023-04-21 RX ADMIN — SODIUM CHLORIDE: 9 INJECTION, SOLUTION INTRAVENOUS at 20:10

## 2023-04-21 RX ADMIN — SEVELAMER CARBONATE 800 MG: 800 TABLET, FILM COATED ORAL at 11:14

## 2023-04-21 RX ADMIN — APIXABAN 5 MG: 5 TABLET, FILM COATED ORAL at 20:28

## 2023-04-21 RX ADMIN — SODIUM CHLORIDE: 9 INJECTION, SOLUTION INTRAVENOUS at 00:03

## 2023-04-21 RX ADMIN — SEVELAMER CARBONATE 800 MG: 800 TABLET, FILM COATED ORAL at 17:04

## 2023-04-21 RX ADMIN — POLYETHYLENE GLYCOL 3350 17 G: 17 POWDER, FOR SOLUTION ORAL at 20:29

## 2023-04-21 RX ADMIN — ACETAMINOPHEN 650 MG: 325 TABLET ORAL at 16:19

## 2023-04-21 RX ADMIN — FINASTERIDE 5 MG: 5 TABLET, FILM COATED ORAL at 20:28

## 2023-04-21 RX ADMIN — MORPHINE SULFATE 2 MG: 2 INJECTION, SOLUTION INTRAMUSCULAR; INTRAVENOUS at 22:35

## 2023-04-21 RX ADMIN — APIXABAN 5 MG: 5 TABLET, FILM COATED ORAL at 08:20

## 2023-04-21 RX ADMIN — LEVOTHYROXINE SODIUM 125 MCG: 125 TABLET ORAL at 07:12

## 2023-04-21 RX ADMIN — Medication 1000 UNITS: at 08:20

## 2023-04-21 RX ADMIN — POLYETHYLENE GLYCOL 3350 17 G: 17 POWDER, FOR SOLUTION ORAL at 08:20

## 2023-04-21 RX ADMIN — TAMSULOSIN HYDROCHLORIDE 0.4 MG: 0.4 CAPSULE ORAL at 20:28

## 2023-04-21 RX ADMIN — ERGOCALCIFEROL 50000 UNITS: 1.25 CAPSULE ORAL at 20:27

## 2023-04-21 RX ADMIN — ALLOPURINOL 100 MG: 100 TABLET ORAL at 08:20

## 2023-04-21 ASSESSMENT — PAIN SCALES - GENERAL
PAINLEVEL_OUTOF10: 6
PAINLEVEL_OUTOF10: 0
PAINLEVEL_OUTOF10: 5
PAINLEVEL_OUTOF10: 4
PAINLEVEL_OUTOF10: 2
PAINLEVEL_OUTOF10: 5
PAINLEVEL_OUTOF10: 5

## 2023-04-21 ASSESSMENT — PAIN DESCRIPTION - LOCATION
LOCATION: BACK

## 2023-04-21 ASSESSMENT — PAIN - FUNCTIONAL ASSESSMENT
PAIN_FUNCTIONAL_ASSESSMENT: ACTIVITIES ARE NOT PREVENTED
PAIN_FUNCTIONAL_ASSESSMENT: ACTIVITIES ARE NOT PREVENTED
PAIN_FUNCTIONAL_ASSESSMENT: PREVENTS OR INTERFERES SOME ACTIVE ACTIVITIES AND ADLS

## 2023-04-21 ASSESSMENT — PAIN DESCRIPTION - ONSET
ONSET: ON-GOING
ONSET: ON-GOING

## 2023-04-21 ASSESSMENT — PAIN DESCRIPTION - DESCRIPTORS
DESCRIPTORS: ACHING

## 2023-04-21 ASSESSMENT — PAIN DESCRIPTION - ORIENTATION
ORIENTATION: LOWER
ORIENTATION: LOWER
ORIENTATION: RIGHT;LEFT;LOWER
ORIENTATION: MID;LOWER

## 2023-04-21 ASSESSMENT — PAIN DESCRIPTION - FREQUENCY
FREQUENCY: INTERMITTENT
FREQUENCY: INTERMITTENT

## 2023-04-21 ASSESSMENT — PAIN DESCRIPTION - PAIN TYPE
TYPE: CHRONIC PAIN
TYPE: CHRONIC PAIN

## 2023-04-22 PROBLEM — R80.9 PROTEINURIA: Status: ACTIVE | Noted: 2023-04-22

## 2023-04-22 PROBLEM — N19 PRERENAL RENAL FAILURE: Status: ACTIVE | Noted: 2023-04-22

## 2023-04-22 LAB
ABSOLUTE EOS #: 0.09 K/UL (ref 0–0.44)
ABSOLUTE IMMATURE GRANULOCYTE: 0 K/UL (ref 0–0.3)
ABSOLUTE LYMPH #: 0.77 K/UL (ref 1.1–3.7)
ABSOLUTE MONO #: 0.59 K/UL (ref 0.1–1.2)
ALBUMIN SERPL-MCNC: 2.5 G/DL (ref 3.5–5.2)
ALBUMIN/GLOBULIN RATIO: 0.5 (ref 1–2.5)
ALP SERPL-CCNC: 53 U/L (ref 40–129)
ALT SERPL-CCNC: 32 U/L (ref 5–41)
ANION GAP SERPL CALCULATED.3IONS-SCNC: 6 MMOL/L (ref 9–17)
AST SERPL-CCNC: 19 U/L
BASOPHILS # BLD: 0 % (ref 0–2)
BASOPHILS ABSOLUTE: 0 K/UL (ref 0–0.2)
BILIRUB SERPL-MCNC: 0.8 MG/DL (ref 0.3–1.2)
BUN SERPL-MCNC: 21 MG/DL (ref 8–23)
BUN/CREAT BLD: 11 (ref 9–20)
CALCIUM SERPL-MCNC: 8 MG/DL (ref 8.6–10.4)
CHLORIDE SERPL-SCNC: 106 MMOL/L (ref 98–107)
CK SERPL-CCNC: 41 U/L (ref 39–308)
CO2 SERPL-SCNC: 23 MMOL/L (ref 20–31)
CREAT SERPL-MCNC: 1.97 MG/DL (ref 0.7–1.2)
EOSINOPHILS RELATIVE PERCENT: 2 % (ref 1–4)
GFR SERPL CREATININE-BSD FRML MDRD: 35 ML/MIN/1.73M2
GLUCOSE SERPL-MCNC: 85 MG/DL (ref 70–99)
HCT VFR BLD AUTO: 29.8 % (ref 40.7–50.3)
HGB BLD-MCNC: 10.1 G/DL (ref 13–17)
IMMATURE GRANULOCYTES: 0 %
LYMPHOCYTES # BLD: 17 % (ref 24–43)
MAGNESIUM SERPL-MCNC: 2 MG/DL (ref 1.6–2.6)
MCH RBC QN AUTO: 30.6 PG (ref 25.2–33.5)
MCHC RBC AUTO-ENTMCNC: 33.9 G/DL (ref 28.4–34.8)
MCV RBC AUTO: 90.3 FL (ref 82.6–102.9)
MONOCYTES # BLD: 13 % (ref 3–12)
MORPHOLOGY: ABNORMAL
MYOGLOBIN URINE: <1 MG/L (ref 0–1)
NRBC AUTOMATED: 0 PER 100 WBC
PDW BLD-RTO: 12.8 % (ref 11.8–14.4)
PHOSPHATE SERPL-MCNC: 4.4 MG/DL (ref 2.5–4.5)
PLATELET # BLD AUTO: 121 K/UL (ref 138–453)
PMV BLD AUTO: 9.5 FL (ref 8.1–13.5)
POTASSIUM SERPL-SCNC: 4.4 MMOL/L (ref 3.7–5.3)
PROT SERPL-MCNC: 7.6 G/DL (ref 6.4–8.3)
RBC # BLD: 3.3 M/UL (ref 4.21–5.77)
SEG NEUTROPHILS: 68 % (ref 36–65)
SEGMENTED NEUTROPHILS ABSOLUTE COUNT: 3.05 K/UL (ref 1.5–8.1)
SODIUM SERPL-SCNC: 135 MMOL/L (ref 135–144)
WBC # BLD AUTO: 4.5 K/UL (ref 3.5–11.3)

## 2023-04-22 PROCEDURE — 6370000000 HC RX 637 (ALT 250 FOR IP)

## 2023-04-22 PROCEDURE — 85025 COMPLETE CBC W/AUTO DIFF WBC: CPT

## 2023-04-22 PROCEDURE — 6370000000 HC RX 637 (ALT 250 FOR IP): Performed by: NURSE PRACTITIONER

## 2023-04-22 PROCEDURE — 86334 IMMUNOFIX E-PHORESIS SERUM: CPT

## 2023-04-22 PROCEDURE — 2580000003 HC RX 258: Performed by: INTERNAL MEDICINE

## 2023-04-22 PROCEDURE — 6360000002 HC RX W HCPCS: Performed by: STUDENT IN AN ORGANIZED HEALTH CARE EDUCATION/TRAINING PROGRAM

## 2023-04-22 PROCEDURE — 82550 ASSAY OF CK (CPK): CPT

## 2023-04-22 PROCEDURE — 6370000000 HC RX 637 (ALT 250 FOR IP): Performed by: STUDENT IN AN ORGANIZED HEALTH CARE EDUCATION/TRAINING PROGRAM

## 2023-04-22 PROCEDURE — 80053 COMPREHEN METABOLIC PANEL: CPT

## 2023-04-22 PROCEDURE — 97166 OT EVAL MOD COMPLEX 45 MIN: CPT

## 2023-04-22 PROCEDURE — 97535 SELF CARE MNGMENT TRAINING: CPT

## 2023-04-22 PROCEDURE — 84100 ASSAY OF PHOSPHORUS: CPT

## 2023-04-22 PROCEDURE — 84165 PROTEIN E-PHORESIS SERUM: CPT

## 2023-04-22 PROCEDURE — 84155 ASSAY OF PROTEIN SERUM: CPT

## 2023-04-22 PROCEDURE — 83735 ASSAY OF MAGNESIUM: CPT

## 2023-04-22 PROCEDURE — 36415 COLL VENOUS BLD VENIPUNCTURE: CPT

## 2023-04-22 PROCEDURE — 1200000000 HC SEMI PRIVATE

## 2023-04-22 PROCEDURE — 94761 N-INVAS EAR/PLS OXIMETRY MLT: CPT

## 2023-04-22 PROCEDURE — 99232 SBSQ HOSP IP/OBS MODERATE 35: CPT | Performed by: INTERNAL MEDICINE

## 2023-04-22 RX ORDER — SENNA PLUS 8.6 MG/1
1 TABLET ORAL NIGHTLY PRN
Status: DISCONTINUED | OUTPATIENT
Start: 2023-04-22 | End: 2023-04-24 | Stop reason: HOSPADM

## 2023-04-22 RX ORDER — LIDOCAINE 4 G/G
1 PATCH TOPICAL DAILY
Status: DISCONTINUED | OUTPATIENT
Start: 2023-04-22 | End: 2023-04-24 | Stop reason: HOSPADM

## 2023-04-22 RX ORDER — DOCUSATE SODIUM 100 MG/1
100 CAPSULE, LIQUID FILLED ORAL DAILY
Status: DISCONTINUED | OUTPATIENT
Start: 2023-04-23 | End: 2023-04-24 | Stop reason: HOSPADM

## 2023-04-22 RX ORDER — SENNA PLUS 8.6 MG/1
1 TABLET ORAL 2 TIMES DAILY
Status: DISCONTINUED | OUTPATIENT
Start: 2023-04-22 | End: 2023-04-22

## 2023-04-22 RX ORDER — DOCUSATE SODIUM 100 MG/1
100 CAPSULE, LIQUID FILLED ORAL 2 TIMES DAILY
Status: DISCONTINUED | OUTPATIENT
Start: 2023-04-22 | End: 2023-04-22

## 2023-04-22 RX ORDER — BISACODYL 10 MG
10 SUPPOSITORY, RECTAL RECTAL DAILY PRN
Status: DISCONTINUED | OUTPATIENT
Start: 2023-04-22 | End: 2023-04-24 | Stop reason: HOSPADM

## 2023-04-22 RX ORDER — POLYETHYLENE GLYCOL 3350 17 G/17G
17 POWDER, FOR SOLUTION ORAL DAILY PRN
Status: DISCONTINUED | OUTPATIENT
Start: 2023-04-22 | End: 2023-04-24 | Stop reason: HOSPADM

## 2023-04-22 RX ADMIN — TAMSULOSIN HYDROCHLORIDE 0.4 MG: 0.4 CAPSULE ORAL at 20:37

## 2023-04-22 RX ADMIN — MORPHINE SULFATE 2 MG: 2 INJECTION, SOLUTION INTRAMUSCULAR; INTRAVENOUS at 00:17

## 2023-04-22 RX ADMIN — FAMOTIDINE 10 MG: 10 TABLET ORAL at 08:00

## 2023-04-22 RX ADMIN — FINASTERIDE 5 MG: 5 TABLET, FILM COATED ORAL at 20:37

## 2023-04-22 RX ADMIN — Medication 1000 UNITS: at 08:06

## 2023-04-22 RX ADMIN — SALINE NASAL SPRAY 2 SPRAY: 1.5 SOLUTION NASAL at 21:59

## 2023-04-22 RX ADMIN — APIXABAN 5 MG: 5 TABLET, FILM COATED ORAL at 20:37

## 2023-04-22 RX ADMIN — SODIUM CHLORIDE: 9 INJECTION, SOLUTION INTRAVENOUS at 15:11

## 2023-04-22 RX ADMIN — SENNOSIDES 8.6 MG: 8.6 TABLET, FILM COATED ORAL at 08:00

## 2023-04-22 RX ADMIN — LEVOTHYROXINE SODIUM 125 MCG: 125 TABLET ORAL at 07:59

## 2023-04-22 RX ADMIN — DOCUSATE SODIUM 100 MG: 100 CAPSULE, LIQUID FILLED ORAL at 07:59

## 2023-04-22 RX ADMIN — APIXABAN 5 MG: 5 TABLET, FILM COATED ORAL at 08:00

## 2023-04-22 RX ADMIN — SODIUM CHLORIDE: 9 INJECTION, SOLUTION INTRAVENOUS at 06:00

## 2023-04-22 RX ADMIN — SEVELAMER CARBONATE 800 MG: 800 TABLET, FILM COATED ORAL at 17:18

## 2023-04-22 RX ADMIN — POLYETHYLENE GLYCOL 3350 17 G: 17 POWDER, FOR SOLUTION ORAL at 07:59

## 2023-04-22 RX ADMIN — SEVELAMER CARBONATE 800 MG: 800 TABLET, FILM COATED ORAL at 08:06

## 2023-04-22 RX ADMIN — ALLOPURINOL 100 MG: 100 TABLET ORAL at 08:00

## 2023-04-22 ASSESSMENT — PAIN DESCRIPTION - LOCATION: LOCATION: BACK

## 2023-04-22 ASSESSMENT — PAIN SCALES - GENERAL
PAINLEVEL_OUTOF10: 0
PAINLEVEL_OUTOF10: 6

## 2023-04-22 ASSESSMENT — PAIN DESCRIPTION - DESCRIPTORS: DESCRIPTORS: ACHING

## 2023-04-22 ASSESSMENT — PAIN DESCRIPTION - ORIENTATION: ORIENTATION: RIGHT;LEFT;LOWER

## 2023-04-22 ASSESSMENT — PAIN - FUNCTIONAL ASSESSMENT: PAIN_FUNCTIONAL_ASSESSMENT: PREVENTS OR INTERFERES SOME ACTIVE ACTIVITIES AND ADLS

## 2023-04-23 ENCOUNTER — APPOINTMENT (OUTPATIENT)
Dept: GENERAL RADIOLOGY | Age: 73
DRG: 683 | End: 2023-04-23
Attending: STUDENT IN AN ORGANIZED HEALTH CARE EDUCATION/TRAINING PROGRAM
Payer: MEDICARE

## 2023-04-23 LAB
ABSOLUTE EOS #: 0.15 K/UL (ref 0–0.44)
ABSOLUTE IMMATURE GRANULOCYTE: 0 K/UL (ref 0–0.3)
ABSOLUTE LYMPH #: 1.02 K/UL (ref 1.1–3.7)
ABSOLUTE MONO #: 0.39 K/UL (ref 0.1–1.2)
ALBUMIN SERPL-MCNC: 2.3 G/DL (ref 3.5–5.2)
ALBUMIN SERPL-MCNC: 2.3 G/DL (ref 3.5–5.2)
ALBUMIN/GLOBULIN RATIO: 0.4 (ref 1–2.5)
ALBUMIN/GLOBULIN RATIO: 0.4 (ref 1–2.5)
ALP SERPL-CCNC: 47 U/L (ref 40–129)
ALP SERPL-CCNC: 47 U/L (ref 40–129)
ALT SERPL-CCNC: 24 U/L (ref 5–41)
ALT SERPL-CCNC: 25 U/L (ref 5–41)
ANION GAP SERPL CALCULATED.3IONS-SCNC: 5 MMOL/L (ref 9–17)
ANION GAP SERPL CALCULATED.3IONS-SCNC: 5 MMOL/L (ref 9–17)
AST SERPL-CCNC: 16 U/L
AST SERPL-CCNC: 16 U/L
BASOPHILS # BLD: 0 % (ref 0–2)
BASOPHILS ABSOLUTE: 0 K/UL (ref 0–0.2)
BILIRUB DIRECT SERPL-MCNC: <0.1 MG/DL
BILIRUB INDIRECT SERPL-MCNC: ABNORMAL MG/DL (ref 0–1)
BILIRUB SERPL-MCNC: 0.6 MG/DL (ref 0.3–1.2)
BILIRUB SERPL-MCNC: 0.7 MG/DL (ref 0.3–1.2)
BUN SERPL-MCNC: 21 MG/DL (ref 8–23)
BUN SERPL-MCNC: 21 MG/DL (ref 8–23)
BUN/CREAT BLD: 12 (ref 9–20)
CALCIUM SERPL-MCNC: 8.1 MG/DL (ref 8.6–10.4)
CALCIUM SERPL-MCNC: 8.1 MG/DL (ref 8.6–10.4)
CHLORIDE SERPL-SCNC: 108 MMOL/L (ref 98–107)
CHLORIDE SERPL-SCNC: 108 MMOL/L (ref 98–107)
CO2 SERPL-SCNC: 23 MMOL/L (ref 20–31)
CO2 SERPL-SCNC: 24 MMOL/L (ref 20–31)
CREAT SERPL-MCNC: 1.69 MG/DL (ref 0.7–1.2)
CREAT SERPL-MCNC: 1.73 MG/DL (ref 0.7–1.2)
EOSINOPHILS RELATIVE PERCENT: 5 % (ref 1–4)
GFR SERPL CREATININE-BSD FRML MDRD: 41 ML/MIN/1.73M2
GFR SERPL CREATININE-BSD FRML MDRD: 43 ML/MIN/1.73M2
GLUCOSE SERPL-MCNC: 80 MG/DL (ref 70–99)
GLUCOSE SERPL-MCNC: 82 MG/DL (ref 70–99)
HCT VFR BLD AUTO: 28.1 % (ref 40.7–50.3)
HGB BLD-MCNC: 9.7 G/DL (ref 13–17)
IMMATURE GRANULOCYTES: 0 %
LYMPHOCYTES # BLD: 34 % (ref 24–43)
MAGNESIUM SERPL-MCNC: 2.1 MG/DL (ref 1.6–2.6)
MCH RBC QN AUTO: 31.5 PG (ref 25.2–33.5)
MCHC RBC AUTO-ENTMCNC: 34.5 G/DL (ref 28.4–34.8)
MCV RBC AUTO: 91.2 FL (ref 82.6–102.9)
MONOCYTES # BLD: 13 % (ref 3–12)
MORPHOLOGY: ABNORMAL
NRBC AUTOMATED: 0 PER 100 WBC
PDW BLD-RTO: 13 % (ref 11.8–14.4)
PHOSPHATE SERPL-MCNC: 4.8 MG/DL (ref 2.5–4.5)
PLATELET # BLD AUTO: 122 K/UL (ref 138–453)
PMV BLD AUTO: 9.4 FL (ref 8.1–13.5)
POTASSIUM SERPL-SCNC: 4.4 MMOL/L (ref 3.7–5.3)
POTASSIUM SERPL-SCNC: 4.4 MMOL/L (ref 3.7–5.3)
PROT SERPL-MCNC: 7.5 G/DL (ref 6.4–8.3)
PROT SERPL-MCNC: 7.6 G/DL (ref 6.4–8.3)
RBC # BLD: 3.08 M/UL (ref 4.21–5.77)
SEG NEUTROPHILS: 48 % (ref 36–65)
SEGMENTED NEUTROPHILS ABSOLUTE COUNT: 1.44 K/UL (ref 1.5–8.1)
SODIUM SERPL-SCNC: 136 MMOL/L (ref 135–144)
SODIUM SERPL-SCNC: 137 MMOL/L (ref 135–144)
WBC # BLD AUTO: 3 K/UL (ref 3.5–11.3)

## 2023-04-23 PROCEDURE — 6370000000 HC RX 637 (ALT 250 FOR IP): Performed by: NURSE PRACTITIONER

## 2023-04-23 PROCEDURE — 36415 COLL VENOUS BLD VENIPUNCTURE: CPT

## 2023-04-23 PROCEDURE — 97535 SELF CARE MNGMENT TRAINING: CPT

## 2023-04-23 PROCEDURE — 99232 SBSQ HOSP IP/OBS MODERATE 35: CPT | Performed by: FAMILY MEDICINE

## 2023-04-23 PROCEDURE — 97110 THERAPEUTIC EXERCISES: CPT

## 2023-04-23 PROCEDURE — 1200000000 HC SEMI PRIVATE

## 2023-04-23 PROCEDURE — 6360000002 HC RX W HCPCS: Performed by: NURSE PRACTITIONER

## 2023-04-23 PROCEDURE — 2580000003 HC RX 258: Performed by: INTERNAL MEDICINE

## 2023-04-23 PROCEDURE — 6370000000 HC RX 637 (ALT 250 FOR IP): Performed by: STUDENT IN AN ORGANIZED HEALTH CARE EDUCATION/TRAINING PROGRAM

## 2023-04-23 PROCEDURE — 97162 PT EVAL MOD COMPLEX 30 MIN: CPT

## 2023-04-23 PROCEDURE — 6370000000 HC RX 637 (ALT 250 FOR IP)

## 2023-04-23 PROCEDURE — 84100 ASSAY OF PHOSPHORUS: CPT

## 2023-04-23 PROCEDURE — 82248 BILIRUBIN DIRECT: CPT

## 2023-04-23 PROCEDURE — 99232 SBSQ HOSP IP/OBS MODERATE 35: CPT | Performed by: INTERNAL MEDICINE

## 2023-04-23 PROCEDURE — 97116 GAIT TRAINING THERAPY: CPT

## 2023-04-23 PROCEDURE — 74018 RADEX ABDOMEN 1 VIEW: CPT

## 2023-04-23 PROCEDURE — 80053 COMPREHEN METABOLIC PANEL: CPT

## 2023-04-23 PROCEDURE — 85025 COMPLETE CBC W/AUTO DIFF WBC: CPT

## 2023-04-23 PROCEDURE — 94761 N-INVAS EAR/PLS OXIMETRY MLT: CPT

## 2023-04-23 PROCEDURE — 83735 ASSAY OF MAGNESIUM: CPT

## 2023-04-23 RX ADMIN — APIXABAN 5 MG: 5 TABLET, FILM COATED ORAL at 21:00

## 2023-04-23 RX ADMIN — Medication 1000 UNITS: at 08:45

## 2023-04-23 RX ADMIN — FINASTERIDE 5 MG: 5 TABLET, FILM COATED ORAL at 21:00

## 2023-04-23 RX ADMIN — ALLOPURINOL 100 MG: 100 TABLET ORAL at 08:44

## 2023-04-23 RX ADMIN — SEVELAMER CARBONATE 800 MG: 800 TABLET, FILM COATED ORAL at 18:37

## 2023-04-23 RX ADMIN — LEVOTHYROXINE SODIUM 125 MCG: 125 TABLET ORAL at 08:44

## 2023-04-23 RX ADMIN — SENNOSIDES 8.6 MG: 8.6 TABLET, FILM COATED ORAL at 21:20

## 2023-04-23 RX ADMIN — TAMSULOSIN HYDROCHLORIDE 0.4 MG: 0.4 CAPSULE ORAL at 21:00

## 2023-04-23 RX ADMIN — ONDANSETRON 4 MG: 2 INJECTION INTRAMUSCULAR; INTRAVENOUS at 12:33

## 2023-04-23 RX ADMIN — SODIUM CHLORIDE: 9 INJECTION, SOLUTION INTRAVENOUS at 10:45

## 2023-04-23 RX ADMIN — APIXABAN 5 MG: 5 TABLET, FILM COATED ORAL at 08:44

## 2023-04-23 RX ADMIN — ACETAMINOPHEN 650 MG: 325 TABLET ORAL at 19:16

## 2023-04-23 RX ADMIN — SEVELAMER CARBONATE 800 MG: 800 TABLET, FILM COATED ORAL at 08:44

## 2023-04-23 ASSESSMENT — PAIN SCALES - WONG BAKER: WONGBAKER_NUMERICALRESPONSE: 0

## 2023-04-23 ASSESSMENT — PAIN DESCRIPTION - LOCATION: LOCATION: BACK;NECK

## 2023-04-23 ASSESSMENT — PAIN SCALES - GENERAL
PAINLEVEL_OUTOF10: 0
PAINLEVEL_OUTOF10: 5
PAINLEVEL_OUTOF10: 6

## 2023-04-23 ASSESSMENT — PAIN DESCRIPTION - ORIENTATION: ORIENTATION: RIGHT;LOWER

## 2023-04-23 ASSESSMENT — PAIN DESCRIPTION - DESCRIPTORS: DESCRIPTORS: ACHING

## 2023-04-24 VITALS
WEIGHT: 177.8 LBS | OXYGEN SATURATION: 100 % | HEIGHT: 64 IN | DIASTOLIC BLOOD PRESSURE: 86 MMHG | BODY MASS INDEX: 30.35 KG/M2 | HEART RATE: 81 BPM | RESPIRATION RATE: 20 BRPM | SYSTOLIC BLOOD PRESSURE: 146 MMHG | TEMPERATURE: 98.2 F

## 2023-04-24 LAB
ABSOLUTE EOS #: 0.06 K/UL (ref 0–0.44)
ABSOLUTE IMMATURE GRANULOCYTE: 0 K/UL (ref 0–0.3)
ABSOLUTE LYMPH #: 1.09 K/UL (ref 1.1–3.7)
ABSOLUTE MONO #: 0.35 K/UL (ref 0.1–1.2)
ALBUMIN SERPL-MCNC: 2.4 G/DL (ref 3.5–5.2)
ALBUMIN/GLOBULIN RATIO: 0.4 (ref 1–2.5)
ALP SERPL-CCNC: 51 U/L (ref 40–129)
ALT SERPL-CCNC: 22 U/L (ref 5–41)
ANION GAP SERPL CALCULATED.3IONS-SCNC: 6 MMOL/L (ref 9–17)
AST SERPL-CCNC: 18 U/L
BASOPHILS # BLD: 0 % (ref 0–2)
BASOPHILS ABSOLUTE: 0 K/UL (ref 0–0.2)
BILIRUB DIRECT SERPL-MCNC: <0.1 MG/DL
BILIRUB INDIRECT SERPL-MCNC: ABNORMAL MG/DL (ref 0–1)
BILIRUB SERPL-MCNC: 0.6 MG/DL (ref 0.3–1.2)
BUN SERPL-MCNC: 17 MG/DL (ref 8–23)
CALCIUM SERPL-MCNC: 8.3 MG/DL (ref 8.6–10.4)
CHLORIDE SERPL-SCNC: 107 MMOL/L (ref 98–107)
CO2 SERPL-SCNC: 23 MMOL/L (ref 20–31)
CREAT SERPL-MCNC: 1.44 MG/DL (ref 0.7–1.2)
EOSINOPHILS RELATIVE PERCENT: 2 % (ref 1–4)
GFR SERPL CREATININE-BSD FRML MDRD: 52 ML/MIN/1.73M2
GLUCOSE SERPL-MCNC: 84 MG/DL (ref 70–99)
HCT VFR BLD AUTO: 29.5 % (ref 40.7–50.3)
HGB BLD-MCNC: 10.2 G/DL (ref 13–17)
IMMATURE GRANULOCYTES: 0 %
LYMPHOCYTES # BLD: 34 % (ref 24–43)
MAGNESIUM SERPL-MCNC: 1.9 MG/DL (ref 1.6–2.6)
MCH RBC QN AUTO: 30.8 PG (ref 25.2–33.5)
MCHC RBC AUTO-ENTMCNC: 34.6 G/DL (ref 28.4–34.8)
MCV RBC AUTO: 89.1 FL (ref 82.6–102.9)
MONOCYTES # BLD: 11 % (ref 3–12)
MORPHOLOGY: NORMAL
NRBC AUTOMATED: 0 PER 100 WBC
PDW BLD-RTO: 12.8 % (ref 11.8–14.4)
PHOSPHATE SERPL-MCNC: 4.9 MG/DL (ref 2.5–4.5)
PLATELET # BLD AUTO: 151 K/UL (ref 138–453)
PMV BLD AUTO: 8.6 FL (ref 8.1–13.5)
POTASSIUM SERPL-SCNC: 3.8 MMOL/L (ref 3.7–5.3)
PROT SERPL-MCNC: 8 G/DL (ref 6.4–8.3)
RBC # BLD: 3.31 M/UL (ref 4.21–5.77)
SEG NEUTROPHILS: 53 % (ref 36–65)
SEGMENTED NEUTROPHILS ABSOLUTE COUNT: 1.7 K/UL (ref 1.5–8.1)
SODIUM SERPL-SCNC: 136 MMOL/L (ref 135–144)
WBC # BLD AUTO: 3.2 K/UL (ref 3.5–11.3)

## 2023-04-24 PROCEDURE — 99232 SBSQ HOSP IP/OBS MODERATE 35: CPT | Performed by: FAMILY MEDICINE

## 2023-04-24 PROCEDURE — 6370000000 HC RX 637 (ALT 250 FOR IP): Performed by: NURSE PRACTITIONER

## 2023-04-24 PROCEDURE — 85025 COMPLETE CBC W/AUTO DIFF WBC: CPT

## 2023-04-24 PROCEDURE — 6370000000 HC RX 637 (ALT 250 FOR IP)

## 2023-04-24 PROCEDURE — 2580000003 HC RX 258: Performed by: INTERNAL MEDICINE

## 2023-04-24 PROCEDURE — 82248 BILIRUBIN DIRECT: CPT

## 2023-04-24 PROCEDURE — 80053 COMPREHEN METABOLIC PANEL: CPT

## 2023-04-24 PROCEDURE — 36415 COLL VENOUS BLD VENIPUNCTURE: CPT

## 2023-04-24 PROCEDURE — 97116 GAIT TRAINING THERAPY: CPT

## 2023-04-24 PROCEDURE — 94761 N-INVAS EAR/PLS OXIMETRY MLT: CPT

## 2023-04-24 PROCEDURE — 84100 ASSAY OF PHOSPHORUS: CPT

## 2023-04-24 PROCEDURE — 83735 ASSAY OF MAGNESIUM: CPT

## 2023-04-24 PROCEDURE — 6370000000 HC RX 637 (ALT 250 FOR IP): Performed by: STUDENT IN AN ORGANIZED HEALTH CARE EDUCATION/TRAINING PROGRAM

## 2023-04-24 RX ORDER — SEVELAMER CARBONATE 800 MG/1
800 TABLET, FILM COATED ORAL 2 TIMES DAILY WITH MEALS
Qty: 90 TABLET | Refills: 3 | Status: SHIPPED | OUTPATIENT
Start: 2023-04-24

## 2023-04-24 RX ORDER — ERGOCALCIFEROL 1.25 MG/1
50000 CAPSULE ORAL WEEKLY
Qty: 5 CAPSULE | Refills: 2 | Status: SHIPPED | OUTPATIENT
Start: 2023-04-28

## 2023-04-24 RX ORDER — SENNA PLUS 8.6 MG/1
1 TABLET ORAL NIGHTLY PRN
Qty: 30 TABLET | Refills: 2 | Status: SHIPPED | OUTPATIENT
Start: 2023-04-24 | End: 2023-04-28

## 2023-04-24 RX ORDER — CHOLECALCIFEROL (VITAMIN D3) 25 MCG
1000 TABLET ORAL DAILY
Qty: 60 TABLET | Refills: 2 | Status: SHIPPED | OUTPATIENT
Start: 2023-04-25

## 2023-04-24 RX ORDER — ALLOPURINOL 100 MG/1
100 TABLET ORAL DAILY
Qty: 30 TABLET | Refills: 3 | Status: SHIPPED | OUTPATIENT
Start: 2023-04-25

## 2023-04-24 RX ORDER — LIDOCAINE 4 G/G
1 PATCH TOPICAL DAILY
Qty: 15 EACH | Refills: 1 | Status: SHIPPED | OUTPATIENT
Start: 2023-04-25 | End: 2023-04-28

## 2023-04-24 RX ORDER — PSEUDOEPHEDRINE HCL 30 MG
100 TABLET ORAL DAILY
Qty: 30 CAPSULE | Refills: 2 | Status: SHIPPED | OUTPATIENT
Start: 2023-04-25

## 2023-04-24 RX ADMIN — SODIUM CHLORIDE: 9 INJECTION, SOLUTION INTRAVENOUS at 01:54

## 2023-04-24 RX ADMIN — ALLOPURINOL 100 MG: 100 TABLET ORAL at 08:47

## 2023-04-24 RX ADMIN — Medication 1000 UNITS: at 08:47

## 2023-04-24 RX ADMIN — APIXABAN 5 MG: 5 TABLET, FILM COATED ORAL at 08:47

## 2023-04-24 RX ADMIN — ACETAMINOPHEN 650 MG: 325 TABLET ORAL at 04:33

## 2023-04-24 RX ADMIN — SEVELAMER CARBONATE 800 MG: 800 TABLET, FILM COATED ORAL at 08:47

## 2023-04-24 RX ADMIN — DOCUSATE SODIUM 100 MG: 100 CAPSULE, LIQUID FILLED ORAL at 08:47

## 2023-04-24 RX ADMIN — LEVOTHYROXINE SODIUM 125 MCG: 125 TABLET ORAL at 07:01

## 2023-04-24 ASSESSMENT — PAIN DESCRIPTION - LOCATION
LOCATION: ABDOMEN
LOCATION: BACK;NECK

## 2023-04-24 ASSESSMENT — PAIN DESCRIPTION - DESCRIPTORS
DESCRIPTORS: CRAMPING
DESCRIPTORS: ACHING

## 2023-04-24 ASSESSMENT — PAIN DESCRIPTION - ONSET: ONSET: ON-GOING

## 2023-04-24 ASSESSMENT — PAIN SCALES - GENERAL
PAINLEVEL_OUTOF10: 2
PAINLEVEL_OUTOF10: 3

## 2023-04-24 ASSESSMENT — PAIN DESCRIPTION - ORIENTATION: ORIENTATION: LOWER

## 2023-04-24 ASSESSMENT — PAIN DESCRIPTION - FREQUENCY: FREQUENCY: INTERMITTENT

## 2023-04-24 NOTE — PROGRESS NOTES
Physical Therapy  Facility/Department: [facility] AT THE [facility] MED SURG  Daily Treatment Note  NAME: Xin Warren  : 1950  MRN: [id number]    Date of Service: 2023    Discharge Recommendations:  Continue to assess pending progress, Patient would benefit from continued therapy after discharge        Patient Diagnosis(es): The encounter diagnosis was Myalgia, lower leg - bilateral .    Assessment   Assessment: Pt amb 300 ft x1 with 4WW and CGA/SBAx1, mild forward flexed posture, slow earnest, no LOB. Pt required 1 standing RB during ambulation to stretch. Transfers CGA/SBAx1, bed mobility SBAx1. Seated and standing Devan LE ther ex, seated hamstring and LB flexion stretches  Activity Tolerance: Patient tolerated treatment well;Patient limited by pain; Patient limited by fatigue     Plan    Physcial Therapy Plan  General Plan: 2 times a day 7 days a week (1x/daily on weekends)  Current Treatment Recommendations: Strengthening;Balance training;Functional mobility training;Transfer training; Endurance training;Gait training;Stair training;Neuromuscular re-education; Safety education & training; Therapeutic activities     Restrictions  Restrictions/Precautions  Restrictions/Precautions: General Precautions     Subjective    Subjective  Subjective: Pt pleasant and agreeable to therapy. Pt states he is doing ok, has some shooting pain down his legs when he turns as he is completing pericare.   Pain: Pt reports 3-4/10 LBP  Orientation  Overall Orientation Status: Within Functional Limits     Objective   Vitals     Bed Mobility Training  Bed Mobility Training: Yes  Overall Level of Assistance: Stand-by assistance;Assist X1  Supine to Sit: Stand-by assistance;Assist X1  Balance  Sitting: Intact  Standing: Impaired  Standing - Static: Good  Standing - Dynamic: Good  Transfer Training  Transfer Training: Yes  Overall Level of Assistance: Contact-guard assistance;Assist X1  Interventions: Verbal cues  Sit to Stand: Contact-guard

## 2023-04-24 NOTE — PROGRESS NOTES
Physical Therapy  Facility/Department: [facility] AT THE [facility] MED SURG  Daily Treatment Note  NAME: Lesia Marsh  : 1950  MRN: [id number]    Date of Service: 2023    Discharge Recommendations:  Continue to assess pending progress, Patient would benefit from continued therapy after discharge        Patient Diagnosis(es): The primary encounter diagnosis was Myalgia, lower leg - bilateral . A diagnosis of Prerenal renal failure was also pertinent to this visit. Assessment   Assessment: Pt amb 400 ft x1 with SBAx1 and no AD, steady earnest, reciprocal gait pattern, NBOS with intermittent scissoring gait, mild path deviations, no LOB. Transfers SBAx1, bed mobility SBA/SUPx1. Standing Devan LE ther ex. Activity Tolerance: Patient tolerated treatment well     Plan    Physcial Therapy Plan  General Plan: 2 times a day 7 days a week (1x/daily on weekends)  Current Treatment Recommendations: Strengthening;Balance training;Functional mobility training;Transfer training; Endurance training;Gait training;Stair training;Neuromuscular re-education; Safety education & training; Therapeutic activities     Restrictions  Restrictions/Precautions  Restrictions/Precautions: General Precautions     Subjective    Subjective  Subjective: Pt in bed upon arrival, agreeable to therapy. Pt states he is feeling better now than he did earlier this morning.   Pain: Pt denies pain currently, has some discomfort and pain when he moves his neck in certain directions  Orientation  Overall Orientation Status: Within Functional Limits     Objective   Vitals     Bed Mobility Training  Bed Mobility Training: Yes  Overall Level of Assistance: Stand-by assistance;Assist X1;Supervision  Supine to Sit: Stand-by assistance;Assist X1;Supervision  Balance  Sitting: Intact  Standing: Intact  Standing - Static: Good  Standing - Dynamic: Good  Transfer Training  Transfer Training: Yes  Overall Level of Assistance: Stand-by assistance;Assist X1  Interventions:

## 2023-04-24 NOTE — PLAN OF CARE
Problem: Discharge Planning  Goal: Discharge to home or other facility with appropriate resources  Outcome: Progressing  Flowsheets (Taken 4/24/2023 0135)  Discharge to home or other facility with appropriate resources:   Identify barriers to discharge with patient and caregiver   Arrange for needed discharge resources and transportation as appropriate     Problem: Pain  Goal: Verbalizes/displays adequate comfort level or baseline comfort level  Outcome: Progressing  Flowsheets (Taken 4/24/2023 0135)  Verbalizes/displays adequate comfort level or baseline comfort level:   Encourage patient to monitor pain and request assistance   Assess pain using appropriate pain scale   Administer analgesics based on type and severity of pain and evaluate response     Problem: Safety - Adult  Goal: Free from fall injury  Outcome: Progressing     Problem: Chronic Conditions and Co-morbidities  Goal: Patient's chronic conditions and co-morbidity symptoms are monitored and maintained or improved  Outcome: Progressing  Flowsheets (Taken 4/24/2023 0135)  Care Plan - Patient's Chronic Conditions and Co-Morbidity Symptoms are Monitored and Maintained or Improved: Monitor and assess patient's chronic conditions and comorbid symptoms for stability, deterioration, or improvement

## 2023-04-24 NOTE — DISCHARGE SUMMARY
tenderness to palpation. EXT:     no edema bilaterally . No calf tenderness. NEURO: Moves all extremities. Motor and sensory are grossly intact  SKIN:    No rashes. No skin lesions    Significant Diagnostic Studies:   Lab Results   Component Value Date    WBC 3.2 (L) 04/24/2023    HGB 10.2 (L) 04/24/2023     04/24/2023       Lab Results   Component Value Date    BUN 17 04/24/2023    CREATININE 1.44 (H) 04/24/2023     04/24/2023    K 3.8 04/24/2023    CALCIUM 8.3 (L) 04/24/2023     04/24/2023    CO2 23 04/24/2023    LABGLOM 52 (L) 04/24/2023       Lab Results   Component Value Date    WBCUA None 04/20/2023    RBCUA 0 TO 2 04/20/2023    EPITHUA None 04/20/2023    LEUKOCYTESUR NEGATIVE 04/20/2023    SPECGRAV 1.010 04/20/2023    GLUCOSEU NEGATIVE 04/20/2023    KETUA NEGATIVE 04/20/2023    PROTEINU NEGATIVE 04/20/2023    HGBUR 1+ (A) 04/20/2023    CASTUA NOT REPORTED 04/02/2013    CRYSTUA NOT REPORTED 04/02/2013    BACTERIA NOT REPORTED 04/02/2013    YEAST NOT REPORTED 04/02/2013       XR CHEST (2 VW)    Result Date: 4/20/2023  EXAMINATION: TWO XRAY VIEWS OF THE CHEST; TWO XRAY VIEWS OF THE ABDOMEN 4/20/2023 3:03 pm COMPARISON: 04/12/2023, 04/05/2023, 04/04/2023 HISTORY: ORDERING SYSTEM PROVIDED HISTORY: fever TECHNOLOGIST PROVIDED HISTORY: fever; ORDERING SYSTEM PROVIDED HISTORY: abdominal pain TECHNOLOGIST PROVIDED HISTORY: abdominal pain FINDINGS: Chest: Left subclavian dual chamber pacer in place. Minimal linear opacities in the lung bases, improved since the prior exam.  No consolidation, pneumothorax or evidence for edema. No effusion identified. No acute osseous abnormality appreciated. Abdomen: Moderate amount of gas and stool throughout the colon is noted. No dilated small bowel identified. No soft tissue calcifications appreciated. No acute osseous abnormality identified. 1.  Chest exam reveals no airspace disease. Interval improved aeration of the lung bases.  2.  Moderate

## 2023-04-24 NOTE — CARE COORDINATION
Cancelled upcoming appt with Dr Robert Crook at [facility] [place] per Uche Martinez, CNP request.  New Pt appt set with Dr Flavia Dial, Nephrology at [facility] for 5/16/23 at 2:40 PM.   Kortney Ingram RN

## 2023-04-24 NOTE — CARE COORDINATION
Spoke with Pt in regard to clarification of Outpatient follow up with Neurologist.  States has decided to follow up with Dr Olivia Whalen with [facility] and had told the Nurse Practitioner that visited from Dr Eric Isaacs office over the weekend that he would be following up with Dr Olivia Whalen.  The note from Domo Caballero CNP with Dr Eric Isaacs office reflects that decision and they have signed off the case.   BAR Longoria RN

## 2023-04-24 NOTE — PROGRESS NOTES
Discharge instructions completed at this time. Medications reviewed. Reminder given for lab work to be done before follow-up appointment with Dr. Deandre Layton. All questions and concerns answered at this time.

## 2023-04-24 NOTE — PROGRESS NOTES
Vitals and reassessment completed at this time as charted. Patient denies any needs. Call light remains within reach.

## 2023-04-24 NOTE — PROGRESS NOTES
Progress Note    SUBJECTIVE:    Patient seen for f/u of JOEL (acute kidney injury) ([address] He resting in bed no distress. Resting in bed no distress. ROS:   Constitutional: negative  for fevers, and negative for chills. Respiratory: negative for shortness of breath, negative for cough, and negative for wheezing  Cardiovascular: negative for chest pain, and negative for palpitations  Gastrointestinal: positive for abdominal pain, negative for nausea,negative for vomiting, negative for diarrhea, and negative for constipation     All other systems were reviewed with the patient and are negative unless otherwise stated in HPI      OBJECTIVE:      Vitals:   Vitals:    04/24/23 0655   BP: (!) 146/86   Pulse: 81   Resp: 20   Temp: 98.2 °F (36.8 °C)   SpO2: 100%     Weight: 177 lb 12.8 oz (80.6 kg)   Height: 5' 4.02\" (162.6 cm)     Weight  Wt Readings from Last 3 Encounters:   04/24/23 177 lb 12.8 oz (80.6 kg)   04/20/23 171 lb 9.6 oz (77.8 kg)   04/19/23 170 lb 12.8 oz (77.5 kg)     Body mass index is 30.5 kg/m². 24HR INTAKE/OUTPUT:      Intake/Output Summary (Last 24 hours) at 4/24/2023 0933  Last data filed at 4/24/2023 0359  Gross per 24 hour   Intake 3808.8 ml   Output 3200 ml   Net 608.8 ml     -----------------------------------------------------------------  Exam:    GEN:    Awake, alert and oriented x3. EYES:  EOMI, pupils equal   NECK: Supple. No lymphadenopathy. No carotid bruit  CVS:    regular rate and rhythm, no audible murmur  PULM:  CTA, no wheezes, rales or rhonchi, no acute respiratory distress  ABD:    Bowels sounds normal.  Abdomen is soft. No distention. no tenderness to palpation. EXT:   no edema bilaterally . No calf tenderness. NEURO: Moves all extremities. Motor and sensory are grossly intact  SKIN:  No rashes.   No skin lesions.    -----------------------------------------------------------------    Diagnostic Data:      Complete Blood Count:   Recent Labs     04/22/23  0519

## 2023-04-24 NOTE — PLAN OF CARE
Problem: Discharge Planning  Goal: Discharge to home or other facility with appropriate resources  4/24/2023 1051 by Vicki Martinez RN  Outcome: Completed     Problem: Pain  Goal: Verbalizes/displays adequate comfort level or baseline comfort level  4/24/2023 1051 by Vicki Martinez RN  Outcome: Completed     Problem: Safety - Adult  Goal: Free from fall injury  4/24/2023 1051 by Vicki Martinez RN  Outcome: Completed     Problem: Chronic Conditions and Co-morbidities  Goal: Patient's chronic conditions and co-morbidity symptoms are monitored and maintained or improved  4/24/2023 1051 by Vicki Martinez RN  Outcome: Completed

## 2023-04-25 ENCOUNTER — CARE COORDINATION (OUTPATIENT)
Dept: CASE MANAGEMENT | Age: 73
End: 2023-04-25

## 2023-04-25 ENCOUNTER — HOSPITAL ENCOUNTER (OUTPATIENT)
Dept: PHYSICAL THERAPY | Age: 73
Setting detail: THERAPIES SERIES
Discharge: HOME | End: 2023-04-25
Payer: MEDICARE

## 2023-04-25 ENCOUNTER — TELEPHONE (OUTPATIENT)
Dept: INTERNAL MEDICINE CLINIC | Age: 73
End: 2023-04-25

## 2023-04-25 ENCOUNTER — HOSPITAL ENCOUNTER (OUTPATIENT)
Age: 73
Discharge: HOME | End: 2023-04-25
Payer: MEDICARE

## 2023-04-25 ENCOUNTER — TELEPHONE (OUTPATIENT)
Dept: NEPHROLOGY | Age: 73
End: 2023-04-25

## 2023-04-25 DIAGNOSIS — N17.9 AKI (ACUTE KIDNEY INJURY) (HCC): Primary | ICD-10-CM

## 2023-04-25 DIAGNOSIS — I10 ESSENTIAL HYPERTENSION: Primary | ICD-10-CM

## 2023-04-25 DIAGNOSIS — N17.9 AKI (ACUTE KIDNEY INJURY) (HCC): ICD-10-CM

## 2023-04-25 DIAGNOSIS — N19 PRERENAL RENAL FAILURE: ICD-10-CM

## 2023-04-25 DIAGNOSIS — D47.2 MONOCLONAL GAMMOPATHY: ICD-10-CM

## 2023-04-25 LAB
ABSOLUTE EOS #: 0.16 K/UL (ref 0–0.44)
ABSOLUTE IMMATURE GRANULOCYTE: 0.05 K/UL (ref 0–0.3)
ABSOLUTE LYMPH #: 1.19 K/UL (ref 1.1–3.7)
ABSOLUTE MONO #: 0.51 K/UL (ref 0.1–1.2)
ALBUMIN (CALCULATED): 2.5 G/DL (ref 3.2–5.2)
ALBUMIN PERCENT: 37 % (ref 45–65)
ALBUMIN SERPL-MCNC: 2.9 G/DL (ref 3.5–5.2)
ALBUMIN/GLOBULIN RATIO: 0.5 (ref 1–2.5)
ALP SERPL-CCNC: 57 U/L (ref 40–129)
ALPHA 1 PERCENT: 4 % (ref 3–6)
ALPHA 2 PERCENT: 9 % (ref 6–13)
ALPHA1 GLOB SERPL ELPH-MCNC: 0.2 G/DL (ref 0.1–0.4)
ALPHA2 GLOB SERPL ELPH-MCNC: 0.6 G/DL (ref 0.5–0.9)
ALT SERPL-CCNC: 21 U/L (ref 5–41)
ANION GAP SERPL CALCULATED.3IONS-SCNC: 6 MMOL/L (ref 9–17)
AST SERPL-CCNC: 17 U/L
B-GLOBULIN SERPL ELPH-MCNC: 0.3 G/DL (ref 0.5–1.1)
BASOPHILS # BLD: 1 % (ref 0–2)
BASOPHILS ABSOLUTE: 0.03 K/UL (ref 0–0.2)
BETA PERCENT: 5 % (ref 11–19)
BILIRUB SERPL-MCNC: 0.4 MG/DL (ref 0.3–1.2)
BUN SERPL-MCNC: 18 MG/DL (ref 8–23)
BUN/CREAT BLD: 12 (ref 9–20)
CALCIUM SERPL-MCNC: 8.9 MG/DL (ref 8.6–10.4)
CHLORIDE SERPL-SCNC: 103 MMOL/L (ref 98–107)
CO2 SERPL-SCNC: 26 MMOL/L (ref 20–31)
CREAT SERPL-MCNC: 1.5 MG/DL (ref 0.7–1.2)
EOSINOPHILS RELATIVE PERCENT: 6 % (ref 1–4)
GAMMA GLOB SERPL ELPH-MCNC: 3.2 G/DL (ref 0.5–1.5)
GAMMA GLOBULIN %: 47 % (ref 9–20)
GFR SERPL CREATININE-BSD FRML MDRD: 49 ML/MIN/1.73M2
GLUCOSE SERPL-MCNC: 96 MG/DL (ref 70–99)
HCT VFR BLD AUTO: 32 % (ref 40.7–50.3)
HGB BLD-MCNC: 10.9 G/DL (ref 13–17)
IMMATURE GRANULOCYTES: 2 %
LYMPHOCYTES # BLD: 44 % (ref 24–43)
MAGNESIUM SERPL-MCNC: 1.9 MG/DL (ref 1.6–2.6)
MCH RBC QN AUTO: 31.2 PG (ref 25.2–33.5)
MCHC RBC AUTO-ENTMCNC: 34.1 G/DL (ref 28.4–34.8)
MCV RBC AUTO: 91.7 FL (ref 82.6–102.9)
MICROORGANISM SPEC CULT: NORMAL
MONOCYTES # BLD: 19 % (ref 3–12)
MORPHOLOGY: NORMAL
NRBC AUTOMATED: 0 PER 100 WBC
PATHOLOGIST: ABNORMAL
PATHOLOGIST: NORMAL
PDW BLD-RTO: 13.1 % (ref 11.8–14.4)
PHOSPHATE SERPL-MCNC: 4.5 MG/DL (ref 2.5–4.5)
PLATELET # BLD AUTO: 196 K/UL (ref 138–453)
PMV BLD AUTO: 8.9 FL (ref 8.1–13.5)
POTASSIUM SERPL-SCNC: 4.4 MMOL/L (ref 3.7–5.3)
PROT SERPL-MCNC: 6.8 G/DL (ref 6.4–8.3)
PROT SERPL-MCNC: 8.9 G/DL (ref 6.4–8.3)
PROTEIN ELECTROPHORESIS, SERUM: ABNORMAL
RBC # BLD: 3.49 M/UL (ref 4.21–5.77)
SEG NEUTROPHILS: 28 % (ref 36–65)
SEGMENTED NEUTROPHILS ABSOLUTE COUNT: 0.76 K/UL (ref 1.5–8.1)
SERUM IFX INTERP: NORMAL
SERVICE CMNT-IMP: NORMAL
SODIUM SERPL-SCNC: 135 MMOL/L (ref 135–144)
SPECIMEN DESCRIPTION: NORMAL
TOTAL PROT. SUM,%: 102 % (ref 98–102)
TOTAL PROT. SUM: 6.8 G/DL (ref 6.3–8.2)
WBC # BLD AUTO: 2.7 K/UL (ref 3.5–11.3)

## 2023-04-25 PROCEDURE — 84100 ASSAY OF PHOSPHORUS: CPT

## 2023-04-25 PROCEDURE — 80053 COMPREHEN METABOLIC PANEL: CPT

## 2023-04-25 PROCEDURE — 83735 ASSAY OF MAGNESIUM: CPT

## 2023-04-25 PROCEDURE — 36415 COLL VENOUS BLD VENIPUNCTURE: CPT

## 2023-04-25 PROCEDURE — 97110 THERAPEUTIC EXERCISES: CPT

## 2023-04-25 PROCEDURE — 97162 PT EVAL MOD COMPLEX 30 MIN: CPT

## 2023-04-25 PROCEDURE — 85025 COMPLETE CBC W/AUTO DIFF WBC: CPT

## 2023-04-25 NOTE — RESULT ENCOUNTER NOTE
I spoke with Dr. Shawn Wu. I inform him of his results. [address] [facility], Please refer him to hematology in [place] Dx:monoclonal gammopathy  Repeat BMP this Friday.

## 2023-04-25 NOTE — TELEPHONE ENCOUNTER
----- Message from Donna Carmona MD sent at 4/25/2023  4:18 PM EDT -----  I spoke with Dr. Luz Maria Davenport. I inform him of his results. [address] [facility], Please refer him to hematology in [place] Dx:monoclonal gammopathy  Repeat BMP this Friday.

## 2023-04-25 NOTE — TELEPHONE ENCOUNTER
I am unable to get [facility] to pull up. Do you know the doctors name?     Lab order pending

## 2023-04-25 NOTE — PROGRESS NOTES
Phone: [phone]                       [facility]          Fax: [phone]                      Outpatient Physical Therapy                                                                      Evaluation  Date: 2023  Patient: Az Kim  : 1950  [facility] #: [id number]    Referring Physician: Bogdan Medina *    Medical Diagnosis: M79.18 - Myalgia, lower leg    Treatment Diagnosis: Balance, Post-COVID  Onset Date: 23  PT Insurance Information: Medicare  Total # of Visits Approved: 12   Total # of Visits to Date: 1  No Show: 0  Canceled Appointment: 0     Subjective  Subjective: Pt states that he has been having difficulty with general balance and strength since having covid on early april. Pt states that he has been bumping into things and having a harder time with transitional and translational movements at home. Pt had instances of syncope and falling on  and  as well. Pt has been noting increased distal pain/discomfort LLE compared to R. Pt notes mild distal numbness in toes occasionally as well. Pt states pain ranges from 0/10 at best to 9/10 at worst over the past week.   Additional Pertinent Hx: HTN, Thyroid issues, Kidney Issues, Heart problems, OA, Lung diseas, Hearing problems, Pacemaker    Observations:   General Observations  Description: Mild forward trunk flexion with inital stand from sitting    Ambulation/Gait (if applicable):   Ambulation  Quality of Gait: Slight increase to [place] with some transitional movements    Balance Screen:   Balance  Tandem Stance R Leg: > 15s  Tandem Stance L Leg: > 15s  Single Stance R Leg: > 15s  Single Stance L Leg: > 15s    Objective    Strength       Strength LLE  L Hip Flexion: 4+/5  L Hip ABduction: 4+/5  L Hip ADduction: 5/5  L Hip Internal Rotation: 4+/5  L Hip External Rotation: 4+/5  L Knee Flexion: 4+/5  L Knee Extension: 5/5  L Ankle Dorsiflexion: 5/5  Strength RLE  R Hip Flexion: 4+/5  R Hip ABduction: 4+/5  R

## 2023-04-25 NOTE — PROGRESS NOTES
Patient: Sharyle Freund  : 1950  Date of Visit: 2023    REASON FOR VISIT / CONSULTATION: shortness of breath,    History of Present Illness:        I had the pleasure of seeing Sharyle Freund in ER consultation today. Mr. Alphonso Moore is a 67 y.o. male well known to me with a history of a dual chamber Biotronik pacemaker for sick sinus syndrome. He also was a smoker many years ago. His echocardiogram on 2017 showed an ejection fraction of 60%. He also has a history of paroxysmal atrial fibrillation which has been fairly rare in the past and has been treated with Eliquis. Normal stress and echo done in [place] He did have syncope in February while in [place] which occurred suddenly after getting up to use the bathroom. He had some pretty severe episodes of lightheadedness and dizziness with recurrent short episodes of loss of consciousness. He was admitted and he had stress test and echo done then and was told both where normal.     Mr. Alphonso Moore initially came to the hospital last week due to a sudden loss of consciousness while standing in front of his kitchen sink while or shortly after going to get a drink of water in the middle of the night. Mr. Alphonso Moore has had a complicated medical course over the last couple weeks including a previous similar syncopal episode about a week ago leading to hospitalization with IVF. During that time he was also found to have a Covid-19 infection leading to treatment with IV steroids. He was brought out by ambulance. Interestingly, he reports being discharged on doxycycline and shortly after staring this his gastritis seemed to get worse including after his last dose at midnight. He did have some pain after taking this. He has been having issues with esophagitis and epigastric pain. He doesn't think his pain didn't cause his syncope. He became lightheaded when fell hitting his head on the floor.  He thinks he was unconscious for

## 2023-04-25 NOTE — CARE COORDINATION
Care Transitions Outreach Attempt    Call within 2 business days of discharge: Yes   Attempted to reach patient for transitions of care follow up. Unable to reach patient. Patient: Mariela Lyle Patient : 1950 MRN: <[id number]>    Last Discharge  Street       Date Complaint Diagnosis Description Type Department Provider    23  Myalgia, lower leg - bilateral  ... Admission (Discharged) Loki Rowe MD          # 1 attempt-Attempted initial 24 hour hospital follow up call. Left a Hipaa compliant message with name and call back information. Requested return call to [phone]. Was this an external facility discharge?  No Discharge Facility: [facility] AT [facility]    Noted following upcoming appointments from discharge chart review:   [facility] follow up appointment(s):   Future Appointments   Date Time Provider Anthony Solis   2023  8:15 AM MD NIR AlarconF HOSP PC [facility]   5/10/2023  3:20 PM Roxane Argueta MD TIFF CARD [facility]   2023  2:40 PM Alexander Ryan MD TIFF KID&HYP [facility]   2023  4:00 PM Arthur Rosas MD TIFF HOSP PC [facility]   2023  9:00 AM MD NIR MannF CARD [facility]   2023  3:00 PM MD HAO Broussard PULM [facility]   2024  8:00 AM SCHEDULE, [facility] NIRFIN CAR BIOTRONIK TIFF CARD [facility]     Non-BS follow up appointment(s):

## 2023-04-25 NOTE — PLAN OF CARE
[facility]           Phone: [phone]             Outpatient Physical Therapy  Fax: [phone]                                           Date: 2023  Patient: Toshia Ulloa : 1950 [facility] #: [id number]   Referring Physician: Maco Weiner      [x] Plan of Care   [] Updated Plan of Care    Dates of Service to Include: 2023 to 23    Diagnosis:  M79.18 - Myalgia, lower leg    Rehab (Treatment) Diagnosis:  Balance, Post-COVID             Onset Date:  23    Attendance  Total # of Visits to Date: 1 No Show: 0 Canceled Appointment: 0    Assessment  Assessment: Pt is a pleasant 68 yo man who presents with balance and endurance deficits. Pt demonstrates WFL BLE strength with very mild limitations to L hip compared to R. Pt demonstrates mild increased to Gust[place] during some aspects of gait with no marked LoB. Pt notes increased pain with certain repetitive movements like bending and sit to stand. Pt completes 5xSTS in 12.10 seconds and TuG in 8.25 seconds without AD. Pt demonstrates equal  strength B. Pt would benefit from skilled therapy to improve dynamic balance and endurance with exercise and community mobility to restore pt to PLOF. Goals  Short Term Goals  Time Frame for Short Term Goals: 3 weeks  Short Term Goal 1: Pt will initiate HEP  Short Term Goal 2: Pt will not exceed 6/10 pain to improve capacity for therex/act. Long Term Goals  Time Frame for Long Term Goals : 6 weeks  Long Term Goal 1: Pt will be comfortable and complient with HEP  Long Term Goal 2: Pt will not exceed 2/10 pain to improve functional capacity for ADLs  Long Term Goal 3: Pt will be able to go 1+ week without any feelings of unsteadiness or bumping into things to improve dynamic balance and stability.   Long Term Goal 4: Pt to be able to ambualte 500+ feet with normalized gait and no LoB to improve

## 2023-04-26 ENCOUNTER — CARE COORDINATION (OUTPATIENT)
Dept: CASE MANAGEMENT | Age: 73
End: 2023-04-26

## 2023-04-26 DIAGNOSIS — M79.18 MYALGIA, LOWER LEG: Primary | ICD-10-CM

## 2023-04-27 ENCOUNTER — HOSPITAL ENCOUNTER (OUTPATIENT)
Dept: PHYSICAL THERAPY | Age: 73
Setting detail: THERAPIES SERIES
Discharge: HOME | End: 2023-04-27
Payer: MEDICARE

## 2023-04-27 DIAGNOSIS — N17.9 AKI (ACUTE KIDNEY INJURY) (HCC): Primary | ICD-10-CM

## 2023-04-27 PROCEDURE — 97110 THERAPEUTIC EXERCISES: CPT

## 2023-04-27 RX ORDER — DEXLANSOPRAZOLE 60 MG/1
60 CAPSULE, DELAYED RELEASE ORAL DAILY
Qty: 30 CAPSULE | Refills: 0 | Status: SHIPPED | OUTPATIENT
Start: 2023-04-27 | End: 2023-05-27

## 2023-04-28 ENCOUNTER — OFFICE VISIT (OUTPATIENT)
Dept: PRIMARY CARE CLINIC | Age: 73
End: 2023-04-28

## 2023-04-28 ENCOUNTER — HOSPITAL ENCOUNTER (OUTPATIENT)
Age: 73
Discharge: HOME | End: 2023-04-28
Payer: MEDICARE

## 2023-04-28 VITALS
HEART RATE: 87 BPM | OXYGEN SATURATION: 100 % | DIASTOLIC BLOOD PRESSURE: 86 MMHG | SYSTOLIC BLOOD PRESSURE: 138 MMHG | WEIGHT: 169 LBS | BODY MASS INDEX: 28.85 KG/M2 | HEIGHT: 64 IN | TEMPERATURE: 98.2 F

## 2023-04-28 DIAGNOSIS — N17.9 AKI (ACUTE KIDNEY INJURY) (HCC): ICD-10-CM

## 2023-04-28 DIAGNOSIS — Z09 HOSPITAL DISCHARGE FOLLOW-UP: Primary | ICD-10-CM

## 2023-04-28 DIAGNOSIS — G89.29 CHRONIC BILATERAL LOW BACK PAIN, UNSPECIFIED WHETHER SCIATICA PRESENT: ICD-10-CM

## 2023-04-28 DIAGNOSIS — M54.50 CHRONIC BILATERAL LOW BACK PAIN, UNSPECIFIED WHETHER SCIATICA PRESENT: ICD-10-CM

## 2023-04-28 DIAGNOSIS — I10 ESSENTIAL HYPERTENSION: ICD-10-CM

## 2023-04-28 LAB
ABSOLUTE EOS #: 0.21 K/UL (ref 0–0.44)
ABSOLUTE IMMATURE GRANULOCYTE: 0.04 K/UL (ref 0–0.3)
ABSOLUTE LYMPH #: 1.6 K/UL (ref 1.1–3.7)
ABSOLUTE MONO #: 0.32 K/UL (ref 0.1–1.2)
ALBUMIN SERPL-MCNC: 3.2 G/DL (ref 3.5–5.2)
ALBUMIN/GLOBULIN RATIO: 0.5 (ref 1–2.5)
ALP SERPL-CCNC: 61 U/L (ref 40–129)
ALT SERPL-CCNC: 23 U/L (ref 5–41)
ANION GAP SERPL CALCULATED.3IONS-SCNC: 8 MMOL/L (ref 9–17)
AST SERPL-CCNC: 22 U/L
BASOPHILS # BLD: 0 % (ref 0–2)
BASOPHILS ABSOLUTE: 0 K/UL (ref 0–0.2)
BILIRUB SERPL-MCNC: 0.6 MG/DL (ref 0.3–1.2)
BUN SERPL-MCNC: 25 MG/DL (ref 8–23)
BUN/CREAT BLD: 17 (ref 9–20)
CALCIUM SERPL-MCNC: 9.6 MG/DL (ref 8.6–10.4)
CHLORIDE SERPL-SCNC: 100 MMOL/L (ref 98–107)
CO2 SERPL-SCNC: 26 MMOL/L (ref 20–31)
CREAT SERPL-MCNC: 1.43 MG/DL (ref 0.7–1.2)
EOSINOPHILS RELATIVE PERCENT: 6 % (ref 1–4)
GFR SERPL CREATININE-BSD FRML MDRD: 52 ML/MIN/1.73M2
GLUCOSE SERPL-MCNC: 95 MG/DL (ref 70–99)
HCT VFR BLD AUTO: 32.9 % (ref 40.7–50.3)
HGB BLD-MCNC: 11.4 G/DL (ref 13–17)
IMMATURE GRANULOCYTES: 1 %
LYMPHOCYTES # BLD: 46 % (ref 24–43)
MCH RBC QN AUTO: 31 PG (ref 25.2–33.5)
MCHC RBC AUTO-ENTMCNC: 34.7 G/DL (ref 28.4–34.8)
MCV RBC AUTO: 89.4 FL (ref 82.6–102.9)
MONOCYTES # BLD: 9 % (ref 3–12)
MORPHOLOGY: NORMAL
NRBC AUTOMATED: 0 PER 100 WBC
PDW BLD-RTO: 13 % (ref 11.8–14.4)
PHOSPHATE SERPL-MCNC: 4.6 MG/DL (ref 2.5–4.5)
PLATELET # BLD AUTO: 217 K/UL (ref 138–453)
PMV BLD AUTO: 8.6 FL (ref 8.1–13.5)
POTASSIUM SERPL-SCNC: 4.4 MMOL/L (ref 3.7–5.3)
PROT SERPL-MCNC: 9.6 G/DL (ref 6.4–8.3)
RBC # BLD: 3.68 M/UL (ref 4.21–5.77)
SEG NEUTROPHILS: 38 % (ref 36–65)
SEGMENTED NEUTROPHILS ABSOLUTE COUNT: 1.33 K/UL (ref 1.5–8.1)
SODIUM SERPL-SCNC: 134 MMOL/L (ref 135–144)
WBC # BLD AUTO: 3.5 K/UL (ref 3.5–11.3)

## 2023-04-28 PROCEDURE — 80053 COMPREHEN METABOLIC PANEL: CPT

## 2023-04-28 PROCEDURE — 36415 COLL VENOUS BLD VENIPUNCTURE: CPT

## 2023-04-28 PROCEDURE — 85025 COMPLETE CBC W/AUTO DIFF WBC: CPT

## 2023-04-28 PROCEDURE — 84100 ASSAY OF PHOSPHORUS: CPT

## 2023-04-28 NOTE — PROGRESS NOTES
Phone: Juanito           Fax: [phone]                           Outpatient Physical Therapy                                                                            Daily Note    Patient: Ney Hung : 1950  [facility] #: [id number]   Referring Physician: Dennys Mendieta *  Date: 2023       Treatment Diagnosis: Balance, Post-COVID    Onset Date: 23  PT Insurance Information: Medicare  Total # of Visits Approved: 12 Per Physician Order  Total # of Visits to Date: 2  No Show: 0  Canceled Appointment: 0    Pre-Treatment Pain:  0/10  Subjective: Pt reports he did okay following eval with not much increased LBP or fatigue. No recent falls. Exercises:  Exercise 1: HEP: Bridges, Balance, Retrowalking  Exercise 2: SciFIt bike 6 mins level 2.5  Exercise 3: FSU/ LSU 10x ea 6 inch  Exercise 4: standing toe raise/ heel raise/ march 15x ea  Exercise 5: sideways amb at counter 4 laps  Exercise 6: 4 big loops    Modalities:   MHP 10 mins to LB in sitting     Assessment  Assessment: Progressed program based on LE strength and general endurance with fair tolerance noted from Pt. Pt displays occasional LOB during prolonged amb but no LOB requiring therapist correction this session. Will continue to progress to dynamic balance exercises as Pt tolerates. Activity Tolerance  Activity Tolerance: Patient tolerated treatment well    Patient Education  Patient Education: Exercise rationale. Pt verbalized/demonstrated good understanding:     [x] Yes         [] No, pt required further clarification. Post Treatment Pain:  0/10    Plan  Plan Frequency: 2x/week  Plan weeks: 6 weeks     Goals  (Total # of Visits to Date: 2)      Short Term Goals  Time Frame for Short Term Goals: 3 weeks  Short Term Goal 1: Pt will initiate HEP  Short Term Goal 2: Pt will not exceed 6/10 pain to improve capacity for therex/act.     Long Term Goals  Time Frame for Long Term

## 2023-04-28 NOTE — PROGRESS NOTES
allopurinol (ZYLOPRIM) 100 MG tablet Take 1 tablet by mouth daily 30 tablet 3    docusate sodium (COLACE, DULCOLAX) 100 MG CAPS Take 100 mg by mouth daily 30 capsule 2    sevelamer (RENVELA) 800 MG tablet Take 1 tablet by mouth with breakfast and with evening meal 90 tablet 3    Cholecalciferol (VITAMIN D) 25 MCG TABS Take 1 tablet by mouth daily 60 tablet 2    Ergocalciferol (VITAMIN D) 42763 units CAPS Take 50,000 Units by mouth once a week 5 capsule 2    Alum Hydroxide-Mag Carbonate (GAVISCON PO) Take 200 mg by mouth as needed      tamsulosin (FLOMAX) 0.4 MG capsule Take 1 capsule by mouth daily 90 capsule 3    levothyroxine (SYNTHROID) 125 MCG tablet Take 1 tablet by mouth Daily 90 tablet 3    ipratropium (ATROVENT) 0.03 % nasal spray 2 sprays by Nasal route 2 times daily as needed for Rhinitis 1 each 2    finasteride (PROSCAR) 5 MG tablet Take 1 tablet by mouth daily 90 tablet 3    apixaban (ELIQUIS) 5 MG TABS tablet Take 1 tablet by mouth 2 times daily Indications: Atrial Fibrillation      fluticasone (ARNUITY ELLIPTA) 100 MCG/ACT AEPB Inhale 1 inhalation into the lungs daily 30 each 5    albuterol sulfate HFA (PROVENTIL;VENTOLIN;PROAIR) 108 (90 Base) MCG/ACT inhaler Inhale 2 puffs into the lungs every 6 hours as needed for Wheezing 18 g 2        Medications patient taking as of now reconciled against medications ordered at time of hospital discharge: Yes    A comprehensive review of systems was negative except for what was noted in the HPI. Constitutional: Positive for activity change, appetite change, no chills, diaphoresis, fatigue, fever and unexpected weight change. HENT: Negative for sinus pressure, sinus pain, sore throat and trouble swallowing. Respiratory: Negative for cough, shortness of breath and wheezing. Cardiovascular: Negative for chest pain, palpitations and leg swelling. Some edema noted. Gastrointestinal: Negative for abdominal pain, diarrhea, nausea and vomiting.  Positive for

## 2023-05-02 ENCOUNTER — HOSPITAL ENCOUNTER (OUTPATIENT)
Dept: GENERAL RADIOLOGY | Age: 73
Discharge: HOME | End: 2023-05-04
Payer: MEDICARE

## 2023-05-02 ENCOUNTER — HOSPITAL ENCOUNTER (OUTPATIENT)
Age: 73
Discharge: HOME | End: 2023-05-02
Payer: MEDICARE

## 2023-05-02 ENCOUNTER — HOSPITAL ENCOUNTER (OUTPATIENT)
Age: 73
Discharge: HOME | End: 2023-05-04
Payer: MEDICARE

## 2023-05-02 ENCOUNTER — HOSPITAL ENCOUNTER (OUTPATIENT)
Dept: PHYSICAL THERAPY | Age: 73
Setting detail: THERAPIES SERIES
Discharge: HOME | End: 2023-05-02
Payer: MEDICARE

## 2023-05-02 DIAGNOSIS — M89.8X1 PAIN IN SCAPULA: ICD-10-CM

## 2023-05-02 DIAGNOSIS — M54.2 NECK PAIN: Primary | ICD-10-CM

## 2023-05-02 DIAGNOSIS — M54.2 NECK PAIN: ICD-10-CM

## 2023-05-02 DIAGNOSIS — N17.9 AKI (ACUTE KIDNEY INJURY) (HCC): ICD-10-CM

## 2023-05-02 LAB
ABSOLUTE EOS #: 0.21 K/UL (ref 0–0.44)
ABSOLUTE IMMATURE GRANULOCYTE: <0.03 K/UL (ref 0–0.3)
ABSOLUTE LYMPH #: 1.93 K/UL (ref 1.1–3.7)
ABSOLUTE MONO #: 0.42 K/UL (ref 0.1–1.2)
ALBUMIN SERPL-MCNC: 3.4 G/DL (ref 3.5–5.2)
ALBUMIN/GLOBULIN RATIO: 0.6 (ref 1–2.5)
ALP SERPL-CCNC: 75 U/L (ref 40–129)
ALT SERPL-CCNC: 20 U/L (ref 5–41)
ANION GAP SERPL CALCULATED.3IONS-SCNC: 7 MMOL/L (ref 9–17)
AST SERPL-CCNC: 20 U/L
BASOPHILS # BLD: 1 % (ref 0–2)
BASOPHILS ABSOLUTE: 0.03 K/UL (ref 0–0.2)
BILIRUB DIRECT SERPL-MCNC: <0.1 MG/DL
BILIRUB INDIRECT SERPL-MCNC: ABNORMAL MG/DL (ref 0–1)
BILIRUB SERPL-MCNC: 0.4 MG/DL (ref 0.3–1.2)
BUN SERPL-MCNC: 28 MG/DL (ref 8–23)
CALCIUM SERPL-MCNC: 9.7 MG/DL (ref 8.6–10.4)
CHLORIDE SERPL-SCNC: 101 MMOL/L (ref 98–107)
CO2 SERPL-SCNC: 27 MMOL/L (ref 20–31)
CREAT SERPL-MCNC: 1.23 MG/DL (ref 0.7–1.2)
EOSINOPHILS RELATIVE PERCENT: 5 % (ref 1–4)
GFR SERPL CREATININE-BSD FRML MDRD: >60 ML/MIN/1.73M2
GLUCOSE SERPL-MCNC: 97 MG/DL (ref 70–99)
HCT VFR BLD AUTO: 33.4 % (ref 40.7–50.3)
HGB BLD-MCNC: 11.6 G/DL (ref 13–17)
IMMATURE GRANULOCYTES: 1 %
LYMPHOCYTES # BLD: 44 % (ref 24–43)
MAGNESIUM SERPL-MCNC: 2 MG/DL (ref 1.6–2.6)
MCH RBC QN AUTO: 30.9 PG (ref 25.2–33.5)
MCHC RBC AUTO-ENTMCNC: 34.7 G/DL (ref 28.4–34.8)
MCV RBC AUTO: 89.1 FL (ref 82.6–102.9)
MONOCYTES # BLD: 10 % (ref 3–12)
NRBC AUTOMATED: 0 PER 100 WBC
PDW BLD-RTO: 13 % (ref 11.8–14.4)
PHOSPHATE SERPL-MCNC: 4.1 MG/DL (ref 2.5–4.5)
PLATELET # BLD AUTO: 272 K/UL (ref 138–453)
PMV BLD AUTO: 8.4 FL (ref 8.1–13.5)
POTASSIUM SERPL-SCNC: 4.4 MMOL/L (ref 3.7–5.3)
PROT SERPL-MCNC: 9.5 G/DL (ref 6.4–8.3)
RBC # BLD: 3.75 M/UL (ref 4.21–5.77)
SEG NEUTROPHILS: 39 % (ref 36–65)
SEGMENTED NEUTROPHILS ABSOLUTE COUNT: 1.68 K/UL (ref 1.5–8.1)
SODIUM SERPL-SCNC: 135 MMOL/L (ref 135–144)
WBC # BLD AUTO: 4.3 K/UL (ref 3.5–11.3)

## 2023-05-02 PROCEDURE — 97110 THERAPEUTIC EXERCISES: CPT

## 2023-05-02 PROCEDURE — 82248 BILIRUBIN DIRECT: CPT

## 2023-05-02 PROCEDURE — 85025 COMPLETE CBC W/AUTO DIFF WBC: CPT

## 2023-05-02 PROCEDURE — 83735 ASSAY OF MAGNESIUM: CPT

## 2023-05-02 PROCEDURE — 80053 COMPREHEN METABOLIC PANEL: CPT

## 2023-05-02 PROCEDURE — 73010 X-RAY EXAM OF SHOULDER BLADE: CPT

## 2023-05-02 PROCEDURE — 36415 COLL VENOUS BLD VENIPUNCTURE: CPT

## 2023-05-02 PROCEDURE — 72050 X-RAY EXAM NECK SPINE 4/5VWS: CPT

## 2023-05-02 PROCEDURE — 84100 ASSAY OF PHOSPHORUS: CPT

## 2023-05-02 NOTE — PROGRESS NOTES
XR ordered re: ongoing MSK pain. F/U scheduled.   Electronically signed by Sondra Momin MD on 5/2/2023 at 11:25 AM

## 2023-05-03 ENCOUNTER — OFFICE VISIT (OUTPATIENT)
Dept: ONCOLOGY | Age: 73
End: 2023-05-03
Payer: MEDICARE

## 2023-05-03 ENCOUNTER — CARE COORDINATION (OUTPATIENT)
Dept: CASE MANAGEMENT | Age: 73
End: 2023-05-03

## 2023-05-03 VITALS
WEIGHT: 167 LBS | HEART RATE: 91 BPM | RESPIRATION RATE: 18 BRPM | SYSTOLIC BLOOD PRESSURE: 146 MMHG | BODY MASS INDEX: 28.67 KG/M2 | DIASTOLIC BLOOD PRESSURE: 91 MMHG | TEMPERATURE: 98 F

## 2023-05-03 DIAGNOSIS — D47.2 MGUS (MONOCLONAL GAMMOPATHY OF UNKNOWN SIGNIFICANCE): Primary | ICD-10-CM

## 2023-05-03 PROCEDURE — 3077F SYST BP >= 140 MM HG: CPT | Performed by: INTERNAL MEDICINE

## 2023-05-03 PROCEDURE — 99205 OFFICE O/P NEW HI 60 MIN: CPT | Performed by: INTERNAL MEDICINE

## 2023-05-03 PROCEDURE — G8417 CALC BMI ABV UP PARAM F/U: HCPCS | Performed by: INTERNAL MEDICINE

## 2023-05-03 PROCEDURE — 3080F DIAST BP >= 90 MM HG: CPT | Performed by: INTERNAL MEDICINE

## 2023-05-03 PROCEDURE — G8427 DOCREV CUR MEDS BY ELIG CLIN: HCPCS | Performed by: INTERNAL MEDICINE

## 2023-05-03 RX ORDER — PREDNISONE 20 MG/1
20 TABLET ORAL DAILY
COMMUNITY

## 2023-05-03 NOTE — PROGRESS NOTES
Phone: Juanito           Fax: [phone]                           Outpatient Physical Therapy                                                                            Daily Note    Patient: Nicholas Call : 1950  CSN #: [id number]   Referring Physician: Maribel Chun *  Date: 2023     Treatment Diagnosis: Balance, Post-COVID    Onset Date: 23  PT Insurance Information: Medicare  Total # of Visits Approved: 12 Per Physician Order  Total # of Visits to Date: 3  No Show: 0  Canceled Appointment: 0    Pre-Treatment Pain:  0/10  Subjective: Pt reports he had a xray today d/t increased neck/ shoulder pain since fall. Pt states he really didnt have much problems following last visit and has been improving. Exercises:  Exercise 1: HEP: Bridges, Balance, Retrowalking  Exercise 2: SciFIt bike 8 mins level 2.5  Exercise 3: FSU/ LSU 10x ea 6 inch  Exercise 5: sideways amb at counter 4 laps GTB  Exercise 6: 4 big loops  Exercise 7: rigo step overs fwd/ retro/ side (flat and 6 inch)  Exercise 8: amb with yes/ no, fwd retro and turns    Assessment  Assessment: Pt completed dynamic balance exercises today with no AD and amb around clinic using SC with min LOB noted. Pt displayed slight LOB d/t LE crossing during amb with head turns. Pt continues to require rest breaks periodically throughout sesison. Will continue. Activity Tolerance  Activity Tolerance: Patient tolerated treatment well    Patient Education  Patient Education: Balance exercise rationale. Pt verbalized/demonstrated good understanding:     [x] Yes         [] No, pt required further clarification.      Post Treatment Pain:  0/10    Plan  Plan Frequency: 2x/week  Plan weeks: 6 weeks     Goals  (Total # of Visits to Date: 3)      Short Term Goals  Time Frame for Short Term Goals: 3 weeks  Short Term Goal 1: Pt will initiate HEP  Short Term Goal 2: Pt will not exceed 6/10 pain

## 2023-05-03 NOTE — CARE COORDINATION
Care Transitions Outreach Attempt #1    Attempted to reach patient for transitions of care follow up. Unable to reach patient. Left message requesting call back. Pt has appt with Dr Sandra Valencia tomorrow. Will re attempt another time. Patient: Yariel Failing Patient : 1950 MRN: [id number]    Last Discharge  [address]       Date Complaint Diagnosis Description Type Department Provider    23  Myalgia, lower leg - bilateral  ...  Admission (Discharged) [address] [facility] Fritz Mendes MD            Noted following upcoming appointments from discharge chart review:   [facility] follow up appointment(s):   Future Appointments   Date Time Provider Anthony Solis   5/3/2023  5:15 PM Damien2 MD nir Fonsecaf onc spe [facility]   2023  1:00 PM Eriberto Lob, PT [facility] [place]   2023  2:45 PM Doe Jose Maria, PTA [facility] [place]   5/10/2023  3:20 PM MD HAO Graff CARD [facility]   2023  3:15 PM Eriberto Lob, PT [facility] [place]   2023  9:45 AM Fritz Mendes MD [facility]   5/15/2023  2:00 PM Doe Jose Maria, PTA [facility] [place]   2023  2:40 PM Lencho Hazel MD TIFF KID&HYP [facility]   2023  2:00 PM Doe Jose Maria, PTA [facility] [place]   2023  2:00 PM Doe Jose Maria, PTA [facility] [place]   2023  2:00 PM Doe Jose Maria, PTA [facility] [place]   2023  1:45 PM Doe Jose Maria, PTA [facility] [place]   2023  2:00 PM Doe Jose Maria, PTA [facility] [place]   2023 11:00 AM Irmapascale Silva, PT [facility] [place]   2023  2:00 PM Doe Jose Maria, PTA [facility] [place]   2023  4:00 PM Fritz Mendes MD [facility]   2023  9:00 AM MD HAO Graff CARD [facility]   2023  3:00 PM MD NIR TomasF PULM TPP   2024  8:00 AM SCHEDULE, [facility]     Cherylene Grayer, RN BSN   Care Transitions Nurse  [phone]

## 2023-05-04 NOTE — PROGRESS NOTES
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 male who is referred to us for monoclonal gammopathy . He has a previous history of cardiac pauses/paroxysmal atrial fibrillation status post pacemaker insertion and currently on anticoagulation. He also developed pericarditis secondary to the pacemaker lead. More recently, he developed [id number] complicated by pneumonia. Unfortunately he developed severe dehydration leading to prerenal azotemia as well as recurrent syncope and dizziness. Nimco Dai He also has history of neurocardiogenic syncope which exacerbated his symptoms. He was seen by nephrology, as part of the work-up, monoclonal protein was appreciated. IgG kappa measuring 0.67 g/dL. He is sent to us for a consultation, he is feeling better he is recovering from recent infection. He is less dizzy and he is more active. PAST MEDICAL HISTORY: has a past medical history of Asthma, BPH (benign prostatic hyperplasia), Calculus of gallbladder without cholecystitis without obstruction, Epigastric pain, Fracture of L1 vertebra ([address] GERD (gastroesophageal reflux disease), Gout, High altitude pulmonary edema, Hyperlipidemia, Hypertension, Myalgia, lower leg, Radiculopathy of leg, Sleep apnea, Thyroid disease, and Wrist fracture, left. PAST SURGICAL HISTORY: has a past surgical history that includes Pacemaker insertion; Colon surgery; Colonoscopy; and Colonoscopy (02/28/2019).      CURRENT MEDICATIONS:  has a current medication list which includes the following prescription(s): prednisone, dexlansoprazole, allopurinol, docusate, sevelamer, vitamin d3, vitamin d (ergocalciferol), alum hydroxide-mag carbonate, tamsulosin, levothyroxine,

## 2023-05-05 ENCOUNTER — HOSPITAL ENCOUNTER (OUTPATIENT)
Dept: PHYSICAL THERAPY | Age: 73
Setting detail: THERAPIES SERIES
Discharge: HOME | End: 2023-05-05
Payer: MEDICARE

## 2023-05-05 PROCEDURE — 97110 THERAPEUTIC EXERCISES: CPT

## 2023-05-05 NOTE — PROGRESS NOTES
Phone: Juanito           Fax: [phone]                           Outpatient Physical Therapy                                                                            Daily Note    Patient: Amanda Humphrey : 1950  [facility] #: [id number]   Referring Physician: Rasheed Weems *  Date: 2023       Treatment Diagnosis: Balance, Post-COVID    Onset Date: 23  PT Insurance Information: Medicare  Total # of Visits Approved: 12 Per Physician Order  Total # of Visits to Date: 4  No Show: 0  Canceled Appointment: 0    23 Plan of Care/Recert Due    Pre-Treatment Pain:  0/10  Subjective: Pt states he is doing well this date, but is still experiencing mild radicular symtpoms down the LUE. Pt feels as if things are coming along well with no pain to note at this time. Exercises:  Exercise 1: HEP: Bridges, Balance, Retrowalking  Exercise 2: SciFIt bike 8 mins level 2.5  Exercise 3: FSU/ LSU 10x ea 6 inch (FSU onto BoSU)  Exercise 5: sideways amb at counter 4 laps YTB  Exercise 9: Slider Lunges x10 B  Exercise 10: SLS with hip rotation x5 B  Exercise 11: Paloff Press PTB x10 B  Exercise 12: Resisted Retrowalking GTB x5  Exercise 13: Median nerve glide 2 ways x5 each  Exercise 14: Chin tuck standing with ball against wall x10    Assessment  Assessment: Pt did very well with all progressed coordination and balance therex this date. Pt demonstrated good motor control with slider lunges as well as eccentric sit to stands from the foam pad. Pt noted soreness with lateral hip abduction exercise, but was able to maintain form and complete exercise with appropiate fatigue and good understanding noted. Plan to continue to progress pt as tolerated. Activity Tolerance  Activity Tolerance: Patient tolerated treatment well    Patient Education  Patient Education: Balance exercise rationale.   Pt verbalized/demonstrated good understanding:     [x] Yes         []

## 2023-05-08 ENCOUNTER — HOSPITAL ENCOUNTER (OUTPATIENT)
Dept: PHYSICAL THERAPY | Age: 73
Setting detail: THERAPIES SERIES
Discharge: HOME | End: 2023-05-08
Payer: MEDICARE

## 2023-05-08 ENCOUNTER — HOSPITAL ENCOUNTER (OUTPATIENT)
Age: 73
Discharge: HOME | End: 2023-05-08
Payer: MEDICARE

## 2023-05-08 DIAGNOSIS — N17.9 AKI (ACUTE KIDNEY INJURY) (HCC): ICD-10-CM

## 2023-05-08 LAB
ABSOLUTE EOS #: 0.17 K/UL (ref 0–0.44)
ABSOLUTE IMMATURE GRANULOCYTE: 0.06 K/UL (ref 0–0.3)
ABSOLUTE LYMPH #: 2.82 K/UL (ref 1.1–3.7)
ABSOLUTE MONO #: 0.61 K/UL (ref 0.1–1.2)
ALBUMIN SERPL-MCNC: 3.9 G/DL (ref 3.5–5.2)
ALBUMIN/GLOBULIN RATIO: 0.8 (ref 1–2.5)
ALP SERPL-CCNC: 83 U/L (ref 40–129)
ALT SERPL-CCNC: 41 U/L (ref 5–41)
ANION GAP SERPL CALCULATED.3IONS-SCNC: 9 MMOL/L (ref 9–17)
AST SERPL-CCNC: 26 U/L
BASOPHILS # BLD: 1 % (ref 0–2)
BASOPHILS ABSOLUTE: 0.08 K/UL (ref 0–0.2)
BILIRUB DIRECT SERPL-MCNC: <0.1 MG/DL
BILIRUB INDIRECT SERPL-MCNC: ABNORMAL MG/DL (ref 0–1)
BILIRUB SERPL-MCNC: 0.4 MG/DL (ref 0.3–1.2)
BUN SERPL-MCNC: 31 MG/DL (ref 8–23)
CALCIUM SERPL-MCNC: 9.9 MG/DL (ref 8.6–10.4)
CHLORIDE SERPL-SCNC: 99 MMOL/L (ref 98–107)
CO2 SERPL-SCNC: 28 MMOL/L (ref 20–31)
CREAT SERPL-MCNC: 1.24 MG/DL (ref 0.7–1.2)
EOSINOPHILS RELATIVE PERCENT: 3 % (ref 1–4)
GFR SERPL CREATININE-BSD FRML MDRD: >60 ML/MIN/1.73M2
GLUCOSE SERPL-MCNC: 115 MG/DL (ref 70–99)
HCT VFR BLD AUTO: 34.6 % (ref 40.7–50.3)
HGB BLD-MCNC: 11.8 G/DL (ref 13–17)
IMMATURE GRANULOCYTES: 1 %
LYMPHOCYTES # BLD: 42 % (ref 24–43)
MAGNESIUM SERPL-MCNC: 1.9 MG/DL (ref 1.6–2.6)
MCH RBC QN AUTO: 30.6 PG (ref 25.2–33.5)
MCHC RBC AUTO-ENTMCNC: 34.1 G/DL (ref 28.4–34.8)
MCV RBC AUTO: 89.9 FL (ref 82.6–102.9)
MONOCYTES # BLD: 9 % (ref 3–12)
NRBC AUTOMATED: 0 PER 100 WBC
PDW BLD-RTO: 13.2 % (ref 11.8–14.4)
PHOSPHATE SERPL-MCNC: 4.1 MG/DL (ref 2.5–4.5)
PLATELET # BLD AUTO: 235 K/UL (ref 138–453)
PMV BLD AUTO: 8.5 FL (ref 8.1–13.5)
POTASSIUM SERPL-SCNC: 4.2 MMOL/L (ref 3.7–5.3)
PROT SERPL-MCNC: 8.5 G/DL (ref 6.4–8.3)
RBC # BLD: 3.85 M/UL (ref 4.21–5.77)
SEG NEUTROPHILS: 44 % (ref 36–65)
SEGMENTED NEUTROPHILS ABSOLUTE COUNT: 3.03 K/UL (ref 1.5–8.1)
SODIUM SERPL-SCNC: 136 MMOL/L (ref 135–144)
WBC # BLD AUTO: 6.8 K/UL (ref 3.5–11.3)

## 2023-05-08 PROCEDURE — 85025 COMPLETE CBC W/AUTO DIFF WBC: CPT

## 2023-05-08 PROCEDURE — 82248 BILIRUBIN DIRECT: CPT

## 2023-05-08 PROCEDURE — 97110 THERAPEUTIC EXERCISES: CPT

## 2023-05-08 PROCEDURE — 80053 COMPREHEN METABOLIC PANEL: CPT

## 2023-05-08 PROCEDURE — 84100 ASSAY OF PHOSPHORUS: CPT

## 2023-05-08 PROCEDURE — 83735 ASSAY OF MAGNESIUM: CPT

## 2023-05-08 PROCEDURE — 36415 COLL VENOUS BLD VENIPUNCTURE: CPT

## 2023-05-09 ENCOUNTER — CARE COORDINATION (OUTPATIENT)
Dept: CASE MANAGEMENT | Age: 73
End: 2023-05-09

## 2023-05-09 NOTE — PROGRESS NOTES
Phone: Juanito           Fax: [phone]                           Outpatient Physical Therapy                                                                            Daily Note    Patient: Randi Ndiaye : 1950  [facility] #: [id number]   Referring Physician: Francy Martinez *  Date: 2023     Treatment Diagnosis: Balance, Post-COVID    Onset Date: 23  PT Insurance Information: Medicare  Total # of Visits Approved: 12 Per Physician Order  Total # of Visits to Date: 5  No Show: 0  Canceled Appointment: 0    23 Plan of Care/Recert Due    Pre-Treatment Pain:  0/10  Subjective: Pt reports he is still stiff/ sore in his neck and getting some LOB with transitions (sit<>stands) and quick turns but he feels like he is improving. Pt reports his steroid has been finished so he notices slightly increased discomfort. Exercises:  Exercise 1: HEP: Bridges, Balance, Retrowalking  Exercise 2: SciFIt bike 8 mins level 2.5  Exercise 3: FSU/ LSU 10x ea 6 inch (FSU onto BoSU)  Exercise 5: sideways amb at counter 4 laps YTB -band at lower calf  Exercise 7: rigo step overs fwd/ retro/ side (flat and 6 inch) air-ex pad  Exercise 8: amb with yes/ no, fwd retro and turns tandem walk, marches  Exercise 10: SLS with hip rotation x5 B  Exercise 12: Resisted Retrowalking GTB x5 (zechariah today 14.5#  Exercise 15: cone lifts from floor 10x    Assessment  Assessment: Minimal to no LOB noted today with dynamic balance exercises. Pt continues to display narrow RENNY with leg crossing at ankles during dynamic balance exercises. Activity Tolerance  Activity Tolerance: Patient tolerated treatment well    Patient Education  Patient Education: Balance exercise rationale. Pt verbalized/demonstrated good understanding:     [x] Yes         [] No, pt required further clarification.      Post Treatment Pain:  0/10    Plan  Plan Frequency: 2x/week  Plan weeks: 6

## 2023-05-09 NOTE — CARE COORDINATION
[facility] Care Transitions Follow Up Call    Patient Current Location:  Home: [facility] [address] [address]    Care Transition Nurse contacted the patient by telephone to follow up after admission on 23. Verified name and  with patient as identifiers. Patient: Mica Portillo  Patient : 1950   MRN: <[id number]>  Reason for Admission:   Discharge Date: 23 RARS: Readmission Risk Score: 19.7      Needs to be reviewed by the provider   Additional needs identified to be addressed with provider: No  none             Method of communication with provider: none. Writer spoke to patient, he stated he is doing well, has started physical therapy, doing well, denies any c/o fever, chills, n/v/d, sob or chest pain, no falls or dizziness, patient has hfu appointment scheduled with pcp, patient stated he didn't want anymore care transition calls at this time, care transitions episode resolved//JU    Addressed changes since last contact:  none  Discussed follow-up appointments. If no appointment was previously scheduled, appointment scheduling offered: Yes. Is follow up appointment scheduled within 7 days of discharge? Yes.     Follow Up  Future Appointments   Date Time Provider Anthony Solis   5/10/2023  3:20 PM [facility]   2023  3:15 PM Dana Lamb, PT [facility] [place]   2023  9:45 AM Alberta Mejia MD [facility] TPP   5/15/2023  2:00 PM Tamir Finely, PTA [facility] [place]   2023  2:40 PM Vince Mello MD [facility] KID[facility]HYP [facility]   2023  2:00 PM Tamir Finely, PTA [facility] [place]   2023  2:00 PM Tamir Finely, PTA [facility] [place]   2023  2:00 PM Tamir Finely, PTA [facility] [place]   2023  1:45 PM Tamir Finely, PTA [facility] [place]   2023  2:00 PM Tamir Finely, PTA [facility] [place]   2023 11:00 AM Farhana Lackey, PT [facility] [place]   2023  2:00 PM Tamir Finely, PTA [facility] [place]   2023  4:00 PM [facility]

## 2023-05-10 ENCOUNTER — OFFICE VISIT (OUTPATIENT)
Dept: CARDIOLOGY | Age: 73
End: 2023-05-10
Payer: MEDICARE

## 2023-05-10 VITALS
OXYGEN SATURATION: 100 % | HEART RATE: 101 BPM | DIASTOLIC BLOOD PRESSURE: 83 MMHG | BODY MASS INDEX: 29.12 KG/M2 | HEIGHT: 64 IN | RESPIRATION RATE: 16 BRPM | SYSTOLIC BLOOD PRESSURE: 137 MMHG | WEIGHT: 170.6 LBS

## 2023-05-10 DIAGNOSIS — E78.2 MIXED HYPERLIPIDEMIA: ICD-10-CM

## 2023-05-10 DIAGNOSIS — E66.9 OBESITY, UNSPECIFIED CLASSIFICATION, UNSPECIFIED OBESITY TYPE, UNSPECIFIED WHETHER SERIOUS COMORBIDITY PRESENT: ICD-10-CM

## 2023-05-10 DIAGNOSIS — I50.32 CHRONIC DIASTOLIC HEART FAILURE (HCC): ICD-10-CM

## 2023-05-10 DIAGNOSIS — R07.89 ATYPICAL CHEST PAIN: ICD-10-CM

## 2023-05-10 DIAGNOSIS — I10 ESSENTIAL HYPERTENSION: ICD-10-CM

## 2023-05-10 DIAGNOSIS — Z95.0 PACEMAKER: ICD-10-CM

## 2023-05-10 DIAGNOSIS — I48.0 PAF (PAROXYSMAL ATRIAL FIBRILLATION) (HCC): Primary | ICD-10-CM

## 2023-05-10 DIAGNOSIS — I49.5 SSS (SICK SINUS SYNDROME) (HCC): ICD-10-CM

## 2023-05-10 PROCEDURE — 93280 PM DEVICE PROGR EVAL DUAL: CPT | Performed by: FAMILY MEDICINE

## 2023-05-10 PROCEDURE — 99214 OFFICE O/P EST MOD 30 MIN: CPT | Performed by: FAMILY MEDICINE

## 2023-05-10 NOTE — PATIENT INSTRUCTIONS
SURVEY:    You may be receiving a survey from [employer] regarding your visit today. Please complete the survey to enable us to provide the highest quality of care to you and your family. If you cannot score us a very good on any question, please call the office to discuss how we could have made your experience a very good one. Thank you.

## 2023-05-10 NOTE — PROGRESS NOTES
since last visit. He says he has not been taking valsartan and atorvastatin. He has been exercising regularly at four in the morning daily. He does take a couple of naps a day. He denies having any chest pain, shortness of breath, palpitations. No abdominal pain, bleeding problems, bowel issues, problems with his medications or any other concerns at this time. No nausea or vomiting. No cough, fever or chills. Bleeding Risks: Mr. Nuha Reeves denies any current or recent bleeding problems including a history of a GI bleed, ulcers, recent or upcoming surgeries, blood in his stool or black tarry stools or blood in his urine. Exercise Tolerance: Mr. Nuha Reeves reports that he has a fair exercise tolerance. His says that he could walk 1 mile without developing chest discomfort or significant shortness of breath. PAST MEDICAL HISTORY:        Past Medical History:   Diagnosis Date    Asthma     BPH (benign prostatic hyperplasia)     Calculus of gallbladder without cholecystitis without obstruction 4/5/2023    Epigastric pain 4/12/2023    Fracture of L1 vertebra ([address] 10/2010    GERD (gastroesophageal reflux disease)     Gout     High altitude pulmonary edema 8/2010    Hyperlipidemia     Hypertension     Myalgia, lower leg 4/20/2023    Radiculopathy of leg     left leg    Sleep apnea     Treated C-PAP 10 years    Thyroid disease     Wrist fracture, left 10/2010     CURRENT ALLERGIES: Amoxicillin-pot clavulanate, Ciprofloxacin, and Amoxicillin REVIEW OF SYSTEMS: 14 systems were reviewed. Pertinent positives and negatives as above, all else negative.      Past Surgical History:   Procedure Laterality Date    COLON SURGERY      perirectal abcess    COLONOSCOPY          COLONOSCOPY  02/28/2019        PACEMAKER INSERTION      Social History:  Social History     Tobacco Use    Smoking status: Former     Packs/day: 0.50     Years: 8.00     Pack years: 4.00     Types: Cigarettes     Quit date: 12/2/1981

## 2023-05-11 ENCOUNTER — HOSPITAL ENCOUNTER (OUTPATIENT)
Dept: PHYSICAL THERAPY | Age: 73
Setting detail: THERAPIES SERIES
Discharge: HOME | End: 2023-05-11
Payer: MEDICARE

## 2023-05-11 PROCEDURE — 97110 THERAPEUTIC EXERCISES: CPT

## 2023-05-11 PROCEDURE — 97140 MANUAL THERAPY 1/> REGIONS: CPT

## 2023-05-11 NOTE — PROGRESS NOTES
Phone: Juanito           Fax: [phone]                           Outpatient Physical Therapy                                                                            Daily Note    Patient: Sheila Ramirez : 1950  [facility] #: [id number]   Referring Physician: Jose Maneul Argueta *  Date: 2023       Treatment Diagnosis: Balance, Post-COVID    Onset Date: 23  PT Insurance Information: Medicare  Total # of Visits Approved: 12 Per Physician Order  Total # of Visits to Date: 6  No Show: 0  Canceled Appointment: 0    23 Plan of Care/Recert Due    Pre-Treatment Pain:  0/10  Subjective: Pt states he feels as if he is coming along well functionally with reduced frequency and intensity of momentary losses of balance. Pt is noting increased stiffness through cervical spine with mild to moderate radicular symptoms. Exercises:  Exercise 1: HEP: Bridges, Balance, Retrowalking  Exercise 6: Thoracic Extensions over foam roll x10 / Thoracic Rotation with ball hugs x10 each  Exercise 9: Rhomboid/SA stretch 4x15\"  Exercise 14: Chin tuck supine x10  Exercise 16: UT/LS Stretch 4x20\" B  Exercise 17: Doorway Pec Stretch 4x15\"    Manual:  Manual Traction: Gebtle manual cervical traction with intermittent suboccipital release 5x30\"    Assessment  Assessment: Pt did well tolerating all thoracic and cervical mobility exercises this date. Pt also responded well initially to gentle manual cervical traction/suboccipital release. Pt noted mild stiffness along L cervical spine as well after manual therapies that was noted as mild. Plan to follow up with pt to determine efficacy of spine related treatment and continue to progress as tolerated. Activity Tolerance  Activity Tolerance: Patient tolerated treatment well    Patient Education  Patient Education: Balance exercise rationale.   Pt verbalized/demonstrated good understanding:     [x] Yes         [] No, pt required

## 2023-05-12 ENCOUNTER — OFFICE VISIT (OUTPATIENT)
Dept: PRIMARY CARE CLINIC | Age: 73
End: 2023-05-12
Payer: MEDICARE

## 2023-05-12 VITALS
RESPIRATION RATE: 16 BRPM | HEART RATE: 91 BPM | DIASTOLIC BLOOD PRESSURE: 92 MMHG | SYSTOLIC BLOOD PRESSURE: 146 MMHG | WEIGHT: 172.2 LBS | OXYGEN SATURATION: 100 % | BODY MASS INDEX: 29.56 KG/M2

## 2023-05-12 DIAGNOSIS — N17.9 AKI (ACUTE KIDNEY INJURY) (HCC): ICD-10-CM

## 2023-05-12 DIAGNOSIS — G89.29 CHRONIC NECK PAIN: Primary | ICD-10-CM

## 2023-05-12 DIAGNOSIS — M54.2 CHRONIC NECK PAIN: Primary | ICD-10-CM

## 2023-05-12 PROCEDURE — 1036F TOBACCO NON-USER: CPT | Performed by: STUDENT IN AN ORGANIZED HEALTH CARE EDUCATION/TRAINING PROGRAM

## 2023-05-12 PROCEDURE — G8417 CALC BMI ABV UP PARAM F/U: HCPCS | Performed by: STUDENT IN AN ORGANIZED HEALTH CARE EDUCATION/TRAINING PROGRAM

## 2023-05-12 PROCEDURE — 99214 OFFICE O/P EST MOD 30 MIN: CPT | Performed by: STUDENT IN AN ORGANIZED HEALTH CARE EDUCATION/TRAINING PROGRAM

## 2023-05-12 PROCEDURE — 1111F DSCHRG MED/CURRENT MED MERGE: CPT | Performed by: STUDENT IN AN ORGANIZED HEALTH CARE EDUCATION/TRAINING PROGRAM

## 2023-05-12 PROCEDURE — 3080F DIAST BP >= 90 MM HG: CPT | Performed by: STUDENT IN AN ORGANIZED HEALTH CARE EDUCATION/TRAINING PROGRAM

## 2023-05-12 PROCEDURE — G8427 DOCREV CUR MEDS BY ELIG CLIN: HCPCS | Performed by: STUDENT IN AN ORGANIZED HEALTH CARE EDUCATION/TRAINING PROGRAM

## 2023-05-12 PROCEDURE — 3077F SYST BP >= 140 MM HG: CPT | Performed by: STUDENT IN AN ORGANIZED HEALTH CARE EDUCATION/TRAINING PROGRAM

## 2023-05-12 PROCEDURE — 3017F COLORECTAL CA SCREEN DOC REV: CPT | Performed by: STUDENT IN AN ORGANIZED HEALTH CARE EDUCATION/TRAINING PROGRAM

## 2023-05-12 PROCEDURE — 1123F ACP DISCUSS/DSCN MKR DOCD: CPT | Performed by: STUDENT IN AN ORGANIZED HEALTH CARE EDUCATION/TRAINING PROGRAM

## 2023-05-12 NOTE — PROGRESS NOTES
[address], [address] , [facility], [address] [facility] Nicole Villanueva is a 67 y.o. male with  has a past medical history of Asthma, BPH (benign prostatic hyperplasia), Calculus of gallbladder without cholecystitis without obstruction, Epigastric pain, Fracture of L1 vertebra ([facility] [address] GERD (gastroesophageal reflux disease), Gout, High altitude pulmonary edema, Hyperlipidemia, Hypertension, Myalgia, lower leg, Radiculopathy of leg, Sleep apnea, Thyroid disease, and Wrist fracture, left. Presented to the office today for:  Chief Complaint   Patient presents with    Neck Pain    Acute Renal Failure       Assessment/Plan   1. Chronic neck pain  2. JOEL (acute kidney injury) ([facility] [address]  -     Urinalysis with Reflex to Culture; Future  -     CBC with Auto Differential; Future  -     Comprehensive Metabolic Panel; Future  -     Phosphorus; Future  -     Magnesium; Future    F/U with Specialists as per prior plans. Continue to monitor renal function, labs, electrolytes  We discussed some of the etiologies and natural histories. We discussed the various treatment alternatives including anti-inflammatory medications, physical therapy, injections, further imaging studies and as a last resort surgery. Return in about 3 months (around 8/12/2023) for F/U Meds/HTN. All patient questions answered. Pt voiced understanding. There are no discontinued medications. Patient received counseling on the following healthy behaviors: nutrition, exercise and medication adherence. I encouraged and discussed lifestyle modifications including diet and exercise and the patient was agreeable to making positive/beneficial changes to both to help improve their overall health. Discussed use, benefit, and side effects of prescribed medications. Barriers to medication compliance addressed. Patient given educational materials: see patient instructions.      HM - HM items completed today as per

## 2023-05-15 ENCOUNTER — HOSPITAL ENCOUNTER (OUTPATIENT)
Age: 73
Discharge: HOME | End: 2023-05-15
Payer: MEDICARE

## 2023-05-15 ENCOUNTER — HOSPITAL ENCOUNTER (OUTPATIENT)
Dept: PHYSICAL THERAPY | Age: 73
Setting detail: THERAPIES SERIES
Discharge: HOME | End: 2023-05-15
Payer: MEDICARE

## 2023-05-15 DIAGNOSIS — N17.9 AKI (ACUTE KIDNEY INJURY) (HCC): ICD-10-CM

## 2023-05-15 LAB
ALBUMIN SERPL-MCNC: 4.2 G/DL (ref 3.5–5.2)
ALBUMIN/GLOB SERPL: 1 {RATIO} (ref 1–2.5)
ALP SERPL-CCNC: 83 U/L (ref 40–129)
ALT SERPL-CCNC: 27 U/L (ref 5–41)
ANION GAP SERPL CALCULATED.3IONS-SCNC: 11 MMOL/L (ref 9–17)
AST SERPL-CCNC: 23 U/L
BACTERIA URNS QL MICRO: ABNORMAL
BASOPHILS # BLD: 0.03 K/UL (ref 0–0.2)
BASOPHILS # BLD: 1 % (ref 0–2)
BILIRUB SERPL-MCNC: 0.6 MG/DL (ref 0.3–1.2)
BILIRUB UR QL STRIP: NEGATIVE
BUN SERPL-MCNC: 21 MG/DL (ref 8–23)
BUN/CREAT BLD: 17 (ref 9–20)
CALCIUM SERPL-MCNC: 9.7 MG/DL (ref 8.6–10.4)
CHLORIDE SERPL-SCNC: 104 MMOL/L (ref 98–107)
CLARITY UR: CLEAR
CO2 SERPL-SCNC: 25 MMOL/L (ref 20–31)
COLOR UR: YELLOW
CREAT SERPL-MCNC: 1.21 MG/DL (ref 0.7–1.2)
EOSINOPHIL # BLD: 0.1 K/UL (ref 0–0.44)
EOSINOPHILS RELATIVE PERCENT: 2 % (ref 1–4)
EPI CELLS #/AREA URNS HPF: ABNORMAL /HPF (ref 0–5)
ERYTHROCYTE [DISTWIDTH] IN BLOOD BY AUTOMATED COUNT: 13.2 % (ref 11.8–14.4)
GFR SERPL CREATININE-BSD FRML MDRD: >60 ML/MIN/1.73M2
GLUCOSE SERPL-MCNC: 87 MG/DL (ref 70–99)
GLUCOSE UR STRIP.AUTO-MCNC: NEGATIVE MG/DL
HCT VFR BLD AUTO: 34.7 % (ref 40.7–50.3)
HGB BLD-MCNC: 12.1 G/DL (ref 13–17)
HGB UR QL STRIP.AUTO: NEGATIVE
IMM GRANULOCYTES # BLD AUTO: <0.03 K/UL (ref 0–0.3)
IMM GRANULOCYTES NFR BLD: 0 %
KETONES UR STRIP.AUTO-MCNC: NEGATIVE MG/DL
LEUKOCYTE ESTERASE UR QL STRIP: NEGATIVE
LYMPHOCYTES # BLD: 51 % (ref 24–43)
LYMPHOCYTES NFR BLD: 2.76 K/UL (ref 1.1–3.7)
MAGNESIUM SERPL-MCNC: 2.1 MG/DL (ref 1.6–2.6)
MCH RBC QN AUTO: 31 PG (ref 25.2–33.5)
MCHC RBC AUTO-ENTMCNC: 34.9 G/DL (ref 28.4–34.8)
MCV RBC AUTO: 89 FL (ref 82.6–102.9)
MONOCYTES NFR BLD: 0.44 K/UL (ref 0.1–1.2)
MONOCYTES NFR BLD: 8 % (ref 3–12)
NEUTROPHILS NFR BLD: 38 % (ref 36–65)
NEUTS SEG NFR BLD: 2.08 K/UL (ref 1.5–8.1)
NITRITE UR QL STRIP: NEGATIVE
NRBC AUTOMATED: 0 PER 100 WBC
PHOSPHATE SERPL-MCNC: 3.3 MG/DL (ref 2.5–4.5)
PLATELET # BLD AUTO: 175 K/UL (ref 138–453)
PMV BLD AUTO: 9.2 FL (ref 8.1–13.5)
POTASSIUM SERPL-SCNC: 4.1 MMOL/L (ref 3.7–5.3)
PROT SERPL-MCNC: 8.6 G/DL (ref 6.4–8.3)
PROT UR STRIP-MCNC: NEGATIVE MG/DL
PROT UR STRIP.AUTO-MCNC: 7 MG/DL (ref 5–9)
RBC # BLD AUTO: 3.9 M/UL (ref 4.21–5.77)
RBC #/AREA URNS HPF: ABNORMAL /HPF (ref 0–2)
SODIUM SERPL-SCNC: 140 MMOL/L (ref 135–144)
SP GR UR STRIP.AUTO: <1.005 (ref 1.01–1.02)
UROBILINOGEN UR STRIP-ACNC: NORMAL
WBC #/AREA URNS HPF: ABNORMAL /HPF (ref 0–5)
WBC OTHER # BLD: 5.4 K/UL (ref 3.5–11.3)

## 2023-05-15 PROCEDURE — 36415 COLL VENOUS BLD VENIPUNCTURE: CPT

## 2023-05-15 PROCEDURE — 83735 ASSAY OF MAGNESIUM: CPT

## 2023-05-15 PROCEDURE — 97110 THERAPEUTIC EXERCISES: CPT

## 2023-05-15 PROCEDURE — 81001 URINALYSIS AUTO W/SCOPE: CPT

## 2023-05-15 PROCEDURE — 80053 COMPREHEN METABOLIC PANEL: CPT

## 2023-05-15 PROCEDURE — 84100 ASSAY OF PHOSPHORUS: CPT

## 2023-05-15 PROCEDURE — 85025 COMPLETE CBC W/AUTO DIFF WBC: CPT

## 2023-05-16 ENCOUNTER — OFFICE VISIT (OUTPATIENT)
Dept: NEPHROLOGY | Age: 73
End: 2023-05-16
Payer: MEDICARE

## 2023-05-16 VITALS
WEIGHT: 169.5 LBS | TEMPERATURE: 98.1 F | HEIGHT: 64 IN | SYSTOLIC BLOOD PRESSURE: 162 MMHG | RESPIRATION RATE: 18 BRPM | HEART RATE: 89 BPM | DIASTOLIC BLOOD PRESSURE: 109 MMHG | BODY MASS INDEX: 28.94 KG/M2

## 2023-05-16 DIAGNOSIS — I10 PRIMARY HYPERTENSION: ICD-10-CM

## 2023-05-16 DIAGNOSIS — E79.0 HYPERURICEMIA: ICD-10-CM

## 2023-05-16 DIAGNOSIS — N17.9 AKI (ACUTE KIDNEY INJURY) (HCC): Primary | ICD-10-CM

## 2023-05-16 PROCEDURE — 1123F ACP DISCUSS/DSCN MKR DOCD: CPT | Performed by: INTERNAL MEDICINE

## 2023-05-16 PROCEDURE — 1111F DSCHRG MED/CURRENT MED MERGE: CPT | Performed by: INTERNAL MEDICINE

## 2023-05-16 PROCEDURE — 99214 OFFICE O/P EST MOD 30 MIN: CPT | Performed by: INTERNAL MEDICINE

## 2023-05-16 PROCEDURE — G8417 CALC BMI ABV UP PARAM F/U: HCPCS | Performed by: INTERNAL MEDICINE

## 2023-05-16 PROCEDURE — 3080F DIAST BP >= 90 MM HG: CPT | Performed by: INTERNAL MEDICINE

## 2023-05-16 PROCEDURE — G8427 DOCREV CUR MEDS BY ELIG CLIN: HCPCS | Performed by: INTERNAL MEDICINE

## 2023-05-16 PROCEDURE — 1036F TOBACCO NON-USER: CPT | Performed by: INTERNAL MEDICINE

## 2023-05-16 PROCEDURE — 3017F COLORECTAL CA SCREEN DOC REV: CPT | Performed by: INTERNAL MEDICINE

## 2023-05-16 PROCEDURE — 3077F SYST BP >= 140 MM HG: CPT | Performed by: INTERNAL MEDICINE

## 2023-05-16 RX ORDER — VALSARTAN 40 MG/1
40 TABLET ORAL DAILY
Qty: 30 TABLET | Refills: 3
Start: 2023-05-16

## 2023-05-16 RX ORDER — ALLOPURINOL 100 MG/1
100 TABLET ORAL DAILY
Qty: 90 TABLET | Refills: 1 | Status: SHIPPED | OUTPATIENT
Start: 2023-05-16

## 2023-05-16 NOTE — PROGRESS NOTES
Frame for Short Term Goals: 3 weeks  Short Term Goal 1: Pt will initiate HEP-MET  Short Term Goal 2: Pt will not exceed 6/10 pain to improve capacity for therex/act. -MET    Long Term Goals  Time Frame for Long Term Goals : 6 weeks  Long Term Goal 1: Pt will be comfortable and complient with HEP  Long Term Goal 2: Pt will not exceed 2/10 pain to improve functional capacity for ADLs  Long Term Goal 3: Pt will be able to go 1+ week without any feelings of unsteadiness or bumping into things to improve dynamic balance and stability. Long Term Goal 4: Pt to be able to ambualte 500+ feet with normalized gait and no LoB to improve ability to manage community distances.     Minutes Tracking:  Time In: 1401  Time Out: 8834  Minutes: 45  Timed Code Treatment Minutes: Eriberto Burton     Date: 5/15/2023

## 2023-05-16 NOTE — PROGRESS NOTES
[facility] KIDNEY AND HYPERTENSION  [address] [facility] [address]  Dept: [phone]  Office Progress Note  5/16/2023 12:39 PM      Pt Name:    Daija Rim:    1950  Primary Care Physician:  Nolan Shafer MD     Chief Complaint:   Chief Complaint   Patient presents with    Follow-Up from Hospital     Pt here for hospital follow up. Pt denies kidney pain or any swelling. Background Information/Interval History:   68 yo male with hx BPH, renal cysts, HTN who was admitted with JOEL thought to be due to ATN. He was managed supportively. His creatinine improved on discharge. Was noted to have mild proteinuria on UPCR with negative disptick. SPEP/MARTINEZ suggested monoclonal response. Was referred to hematology and dxed with MGUS. He is here for follow-up. BP today is 160/109. He says BP at home was in 130 range.  He says he did not tolerate beta blocker or clonidine in the past. He was on valsartan in the past.      Past History:  Past Medical History:   Diagnosis Date    Asthma     BPH (benign prostatic hyperplasia)     Calculus of gallbladder without cholecystitis without obstruction 4/5/2023    Epigastric pain 4/12/2023    Fracture of L1 vertebra ([address] 10/2010    GERD (gastroesophageal reflux disease)     Gout     High altitude pulmonary edema 8/2010    Hyperlipidemia     Hypertension     Myalgia, lower leg 4/20/2023    Radiculopathy of leg     left leg    Sleep apnea     Treated C-PAP 10 years    Thyroid disease     Wrist fracture, left 10/2010     Past Surgical History:   Procedure Laterality Date    COLON SURGERY      perirectal abcess    COLONOSCOPY          COLONOSCOPY  02/28/2019        PACEMAKER INSERTION          VITALS:  BP (!) 162/109 (Site: Left Upper Arm, Position: Sitting, Cuff Size: Medium Adult)   Pulse 89   Temp 98.1 °F (36.7 °C) (Temporal)   Resp 18   Ht 5' 4\" (1.626

## 2023-05-18 ENCOUNTER — APPOINTMENT (OUTPATIENT)
Dept: PHYSICAL THERAPY | Age: 73
End: 2023-05-18
Payer: MEDICARE

## 2023-05-20 PROBLEM — Z09 HOSPITAL DISCHARGE FOLLOW-UP: Status: RESOLVED | Noted: 2023-04-20 | Resolved: 2023-05-20

## 2023-05-22 ENCOUNTER — HOSPITAL ENCOUNTER (OUTPATIENT)
Dept: PHYSICAL THERAPY | Age: 73
Setting detail: THERAPIES SERIES
Discharge: HOME | End: 2023-05-22
Payer: MEDICARE

## 2023-05-22 PROCEDURE — 97110 THERAPEUTIC EXERCISES: CPT

## 2023-05-23 ENCOUNTER — HOSPITAL ENCOUNTER (OUTPATIENT)
Age: 73
Discharge: HOME | End: 2023-05-23
Payer: MEDICARE

## 2023-05-23 ENCOUNTER — HOSPITAL ENCOUNTER (OUTPATIENT)
Dept: NON INVASIVE DIAGNOSTICS | Age: 73
Discharge: HOME | End: 2023-05-23
Payer: MEDICARE

## 2023-05-23 DIAGNOSIS — I48.0 PAF (PAROXYSMAL ATRIAL FIBRILLATION) (HCC): ICD-10-CM

## 2023-05-23 DIAGNOSIS — I10 ESSENTIAL HYPERTENSION: ICD-10-CM

## 2023-05-23 DIAGNOSIS — I10 PRIMARY HYPERTENSION: ICD-10-CM

## 2023-05-23 DIAGNOSIS — E66.9 OBESITY, UNSPECIFIED CLASSIFICATION, UNSPECIFIED OBESITY TYPE, UNSPECIFIED WHETHER SERIOUS COMORBIDITY PRESENT: ICD-10-CM

## 2023-05-23 DIAGNOSIS — I50.32 CHRONIC DIASTOLIC HEART FAILURE (HCC): ICD-10-CM

## 2023-05-23 DIAGNOSIS — Z95.0 PACEMAKER: ICD-10-CM

## 2023-05-23 DIAGNOSIS — E79.0 HYPERURICEMIA: ICD-10-CM

## 2023-05-23 DIAGNOSIS — I49.5 SSS (SICK SINUS SYNDROME) (HCC): ICD-10-CM

## 2023-05-23 DIAGNOSIS — E78.2 MIXED HYPERLIPIDEMIA: ICD-10-CM

## 2023-05-23 DIAGNOSIS — N17.9 AKI (ACUTE KIDNEY INJURY) (HCC): ICD-10-CM

## 2023-05-23 LAB
ANION GAP SERPL CALCULATED.3IONS-SCNC: 9 MMOL/L (ref 9–17)
BUN SERPL-MCNC: 16 MG/DL (ref 8–23)
BUN/CREAT SERPL: 15 (ref 9–20)
CALCIUM SERPL-MCNC: 9.8 MG/DL (ref 8.6–10.4)
CHLORIDE SERPL-SCNC: 105 MMOL/L (ref 98–107)
CO2 SERPL-SCNC: 27 MMOL/L (ref 20–31)
CREAT SERPL-MCNC: 1.1 MG/DL (ref 0.7–1.2)
GFR SERPL CREATININE-BSD FRML MDRD: >60 ML/MIN/1.73M2
GLUCOSE SERPL-MCNC: 100 MG/DL (ref 70–99)
POTASSIUM SERPL-SCNC: 3.9 MMOL/L (ref 3.7–5.3)
SODIUM SERPL-SCNC: 141 MMOL/L (ref 135–144)
URATE SERPL-MCNC: 7.3 MG/DL (ref 3.4–7)

## 2023-05-23 PROCEDURE — 84550 ASSAY OF BLOOD/URIC ACID: CPT

## 2023-05-23 PROCEDURE — 6360000002 HC RX W HCPCS: Performed by: FAMILY MEDICINE

## 2023-05-23 PROCEDURE — 80048 BASIC METABOLIC PNL TOTAL CA: CPT

## 2023-05-23 PROCEDURE — 93017 CV STRESS TEST TRACING ONLY: CPT

## 2023-05-23 PROCEDURE — 36415 COLL VENOUS BLD VENIPUNCTURE: CPT

## 2023-05-23 PROCEDURE — 3430000000 HC RX DIAGNOSTIC RADIOPHARMACEUTICAL: Performed by: FAMILY MEDICINE

## 2023-05-23 PROCEDURE — A9500 TC99M SESTAMIBI: HCPCS | Performed by: FAMILY MEDICINE

## 2023-05-23 RX ORDER — TETRAKIS(2-METHOXYISOBUTYLISOCYANIDE)COPPER(I) TETRAFLUOROBORATE 1 MG/ML
30 INJECTION, POWDER, LYOPHILIZED, FOR SOLUTION INTRAVENOUS
Status: COMPLETED | OUTPATIENT
Start: 2023-05-23 | End: 2023-05-23

## 2023-05-23 RX ADMIN — REGADENOSON 0.4 MG: 0.08 INJECTION, SOLUTION INTRAVENOUS at 09:55

## 2023-05-23 RX ADMIN — Medication 30 MILLICURIE: at 09:40

## 2023-05-23 NOTE — PROGRESS NOTES
Phone: Juanito           Fax: [phone]                           Outpatient Physical Therapy                                                                            Daily Note    Patient: Fabrizio Franklin : 1950  CSN #: [id number]   Referring Physician: Sridevi Balderas *  Date: 2023     Treatment Diagnosis: Balance, Post-COVID    Onset Date: 23  PT Insurance Information: Medicare  Total # of Visits Approved: 12 Per Physician Order  Total # of Visits to Date: 8  No Show: 0  Canceled Appointment: 0    23 Plan of Care/Recert Due    Pre-Treatment Pain:  0/10  Subjective: Pt states his neck seems to be a little better today but hes noticed increased R calf/ lateral leg discomfort like what hes had in the past with sciatic pain. Exercises:  Exercise 2: SciFIt bike 8 mins level 2.5  Exercise 3: FSU/ LSU 10x ea 8 inch  Exercise 5: sideways amb at counter 4 laps YTB -band at lower calf  Exercise 8: amb with yes/ no, fwd retro and turns tandem walk, marches  Exercise 19: -measurements for hold    Assessment  Assessment: Pt reports approx 50% overall improvament since begining therapy in regards to strength and endurance but he states some of his deficits remain with return to exercise program.  Pt states pain overall ranges from a 0-4/10 on normal day to day basis but its been feeling better. Pt displays ability to amb greater than 500 feet with no rest break and no longer is using AD. Pt able to complete 13 sit<>stands in 30 seconds with arms across chest and current TUG time is 6.54 seconds. Pt would like to continue with HEP at this point with follow-up (if needed) on  with PT. Activity Tolerance  Activity Tolerance: Patient tolerated treatment well    Patient Education  Patient Education: Continue HEP  Pt verbalized/demonstrated good understanding:     [x] Yes         [] No, pt required further clarification.      Post

## 2023-05-24 ENCOUNTER — HOSPITAL ENCOUNTER (OUTPATIENT)
Dept: NON INVASIVE DIAGNOSTICS | Age: 73
Discharge: HOME | End: 2023-05-24
Payer: MEDICARE

## 2023-05-24 ENCOUNTER — TELEPHONE (OUTPATIENT)
Dept: NEPHROLOGY | Age: 73
End: 2023-05-24

## 2023-05-24 DIAGNOSIS — E79.0 HYPERURICEMIA: Primary | ICD-10-CM

## 2023-05-24 PROCEDURE — 3430000000 HC RX DIAGNOSTIC RADIOPHARMACEUTICAL: Performed by: FAMILY MEDICINE

## 2023-05-24 PROCEDURE — A9500 TC99M SESTAMIBI: HCPCS | Performed by: FAMILY MEDICINE

## 2023-05-24 PROCEDURE — 78452 HT MUSCLE IMAGE SPECT MULT: CPT

## 2023-05-24 RX ORDER — TETRAKIS(2-METHOXYISOBUTYLISOCYANIDE)COPPER(I) TETRAFLUOROBORATE 1 MG/ML
30 INJECTION, POWDER, LYOPHILIZED, FOR SOLUTION INTRAVENOUS
Status: COMPLETED | OUTPATIENT
Start: 2023-05-24 | End: 2023-05-24

## 2023-05-24 RX ORDER — DEXLANSOPRAZOLE 60 MG/1
CAPSULE, DELAYED RELEASE ORAL
Qty: 30 CAPSULE | Refills: 3 | Status: SHIPPED | OUTPATIENT
Start: 2023-05-24

## 2023-05-24 RX ADMIN — Medication 30 MILLICURIE: at 13:59

## 2023-05-24 NOTE — TELEPHONE ENCOUNTER
----- Message from Serge Horta MD sent at 5/23/2023  8:32 PM EDT -----  Uric acid level is improving, pls repeat again in 3 months.

## 2023-05-24 NOTE — TELEPHONE ENCOUNTER
Spoke to pt, he stated that he is aware of his lab results and to do labs in 3 months.     Lab order pending

## 2023-05-24 NOTE — PROCEDURES
with low risk for significant  ischemia. Although the patient's results were not completely normal, unless  clinical suspicion for significant ongoing coronary artery ischemia is  high, I would not suggest pursuing additional testing by coronary  angiography.           Didi Duncan MD    D: 05/24/2023 15:53:36       T: 05/24/2023 16:55:33     [email or web address]/[email or web address]_BRYAN  Job#: [id number]     Doc#: Unknown    CC:  MD Tarsha Spann MD
See MAR for last dose taken

## 2023-05-25 ENCOUNTER — APPOINTMENT (OUTPATIENT)
Dept: PHYSICAL THERAPY | Age: 73
End: 2023-05-25
Payer: MEDICARE

## 2023-05-25 ENCOUNTER — TELEPHONE (OUTPATIENT)
Dept: CARDIOLOGY | Age: 73
End: 2023-05-25

## 2023-05-25 NOTE — TELEPHONE ENCOUNTER
----- Message from Javad Doherty MD sent at 5/24/2023 11:25 PM EDT -----  Let Mr. Vicky Mari know their test result was ok. Will discuss at next visit. Thanks.

## 2023-05-30 ENCOUNTER — APPOINTMENT (OUTPATIENT)
Dept: PHYSICAL THERAPY | Age: 73
End: 2023-05-30
Payer: MEDICARE

## 2023-07-10 DIAGNOSIS — Z13.220 LIPID SCREENING: ICD-10-CM

## 2023-07-10 DIAGNOSIS — Z12.5 SCREENING PSA (PROSTATE SPECIFIC ANTIGEN): ICD-10-CM

## 2023-07-10 DIAGNOSIS — I10 ESSENTIAL HYPERTENSION: ICD-10-CM

## 2023-07-10 DIAGNOSIS — N17.9 AKI (ACUTE KIDNEY INJURY) (HCC): Primary | ICD-10-CM

## 2023-07-10 DIAGNOSIS — R73.9 HYPERGLYCEMIA: ICD-10-CM

## 2023-07-10 DIAGNOSIS — I48.0 PAF (PAROXYSMAL ATRIAL FIBRILLATION) (HCC): ICD-10-CM

## 2023-07-11 RX ORDER — APIXABAN 5 MG/1
TABLET, FILM COATED ORAL
Qty: 180 TABLET | Refills: 3 | Status: SHIPPED | OUTPATIENT
Start: 2023-07-11

## 2023-07-14 ENCOUNTER — HOSPITAL ENCOUNTER (OUTPATIENT)
Age: 73
Discharge: HOME | End: 2023-07-14
Payer: MEDICARE

## 2023-07-14 DIAGNOSIS — I10 ESSENTIAL HYPERTENSION: ICD-10-CM

## 2023-07-14 DIAGNOSIS — R73.9 HYPERGLYCEMIA: ICD-10-CM

## 2023-07-14 DIAGNOSIS — N17.9 AKI (ACUTE KIDNEY INJURY) (HCC): ICD-10-CM

## 2023-07-14 DIAGNOSIS — Z12.5 SCREENING PSA (PROSTATE SPECIFIC ANTIGEN): ICD-10-CM

## 2023-07-14 DIAGNOSIS — Z13.220 LIPID SCREENING: ICD-10-CM

## 2023-07-14 DIAGNOSIS — I48.0 PAF (PAROXYSMAL ATRIAL FIBRILLATION) (HCC): ICD-10-CM

## 2023-07-14 LAB
ALBUMIN SERPL-MCNC: 4.4 G/DL (ref 3.5–5.2)
ALBUMIN/GLOB SERPL: 1.5 {RATIO} (ref 1–2.5)
ALP SERPL-CCNC: 63 U/L (ref 40–129)
ALT SERPL-CCNC: 14 U/L (ref 5–41)
ANION GAP SERPL CALCULATED.3IONS-SCNC: 7 MMOL/L (ref 9–17)
AST SERPL-CCNC: 20 U/L
BASOPHILS # BLD: <0.03 K/UL (ref 0–0.2)
BASOPHILS NFR BLD: 0 % (ref 0–2)
BILIRUB SERPL-MCNC: 0.6 MG/DL (ref 0.3–1.2)
BUN SERPL-MCNC: 20 MG/DL (ref 8–23)
BUN/CREAT SERPL: 20 (ref 9–20)
CALCIUM SERPL-MCNC: 9.6 MG/DL (ref 8.6–10.4)
CHLORIDE SERPL-SCNC: 106 MMOL/L (ref 98–107)
CHOLEST SERPL-MCNC: 228 MG/DL
CHOLESTEROL/HDL RATIO: 4.9
CO2 SERPL-SCNC: 27 MMOL/L (ref 20–31)
CREAT SERPL-MCNC: 1 MG/DL (ref 0.7–1.2)
EOSINOPHIL # BLD: 0.13 K/UL (ref 0–0.44)
EOSINOPHILS RELATIVE PERCENT: 3 % (ref 1–4)
ERYTHROCYTE [DISTWIDTH] IN BLOOD BY AUTOMATED COUNT: 11.9 % (ref 11.8–14.4)
EST. AVERAGE GLUCOSE BLD GHB EST-MCNC: 65 MG/DL
GFR SERPL CREATININE-BSD FRML MDRD: >60 ML/MIN/1.73M2
GLUCOSE SERPL-MCNC: 98 MG/DL (ref 70–99)
HBA1C MFR BLD: 3.9 % (ref 4–6)
HCT VFR BLD AUTO: 38.6 % (ref 40.7–50.3)
HDLC SERPL-MCNC: 47 MG/DL
HGB BLD-MCNC: 13.4 G/DL (ref 13–17)
IMM GRANULOCYTES # BLD AUTO: <0.03 K/UL (ref 0–0.3)
IMM GRANULOCYTES NFR BLD: 0 %
LDLC SERPL CALC-MCNC: 145 MG/DL (ref 0–130)
LYMPHOCYTES # BLD: 54 % (ref 24–43)
LYMPHOCYTES NFR BLD: 2.67 K/UL (ref 1.1–3.7)
MCH RBC QN AUTO: 30.3 PG (ref 25.2–33.5)
MCHC RBC AUTO-ENTMCNC: 34.7 G/DL (ref 28.4–34.8)
MCV RBC AUTO: 87.3 FL (ref 82.6–102.9)
MONOCYTES NFR BLD: 0.34 K/UL (ref 0.1–1.2)
MONOCYTES NFR BLD: 7 % (ref 3–12)
NEUTROPHILS NFR BLD: 36 % (ref 36–65)
NEUTS SEG NFR BLD: 1.74 K/UL (ref 1.5–8.1)
NRBC BLD-RTO: 0 PER 100 WBC
PLATELET # BLD AUTO: 152 K/UL (ref 138–453)
PMV BLD AUTO: 9.1 FL (ref 8.1–13.5)
POTASSIUM SERPL-SCNC: 4.9 MMOL/L (ref 3.7–5.3)
PROT SERPL-MCNC: 7.4 G/DL (ref 6.4–8.3)
PSA SERPL-MCNC: 0.58 NG/ML
RBC # BLD AUTO: 4.42 M/UL (ref 4.21–5.77)
SODIUM SERPL-SCNC: 140 MMOL/L (ref 135–144)
TRIGL SERPL-MCNC: 180 MG/DL
TSH SERPL DL<=0.05 MIU/L-ACNC: 0.35 UIU/ML (ref 0.3–5)
URATE SERPL-MCNC: 6.2 MG/DL (ref 3.4–7)
WBC OTHER # BLD: 4.9 K/UL (ref 3.5–11.3)

## 2023-07-14 PROCEDURE — 80053 COMPREHEN METABOLIC PANEL: CPT

## 2023-07-14 PROCEDURE — 85027 COMPLETE CBC AUTOMATED: CPT

## 2023-07-14 PROCEDURE — 36415 COLL VENOUS BLD VENIPUNCTURE: CPT

## 2023-07-14 PROCEDURE — 83036 HEMOGLOBIN GLYCOSYLATED A1C: CPT

## 2023-07-14 PROCEDURE — 80061 LIPID PANEL: CPT

## 2023-07-14 PROCEDURE — 84443 ASSAY THYROID STIM HORMONE: CPT

## 2023-07-14 PROCEDURE — G0103 PSA SCREENING: HCPCS

## 2023-07-14 PROCEDURE — 84550 ASSAY OF BLOOD/URIC ACID: CPT

## 2023-07-17 ENCOUNTER — OFFICE VISIT (OUTPATIENT)
Dept: CARDIOLOGY | Age: 73
End: 2023-07-17
Payer: MEDICARE

## 2023-07-17 VITALS
DIASTOLIC BLOOD PRESSURE: 85 MMHG | OXYGEN SATURATION: 97 % | RESPIRATION RATE: 18 BRPM | WEIGHT: 174 LBS | HEIGHT: 64 IN | HEART RATE: 77 BPM | BODY MASS INDEX: 29.71 KG/M2 | SYSTOLIC BLOOD PRESSURE: 148 MMHG

## 2023-07-17 DIAGNOSIS — Z87.448 HISTORY OF RENAL INSUFFICIENCY: ICD-10-CM

## 2023-07-17 DIAGNOSIS — Z95.0 PACEMAKER: ICD-10-CM

## 2023-07-17 DIAGNOSIS — I49.5 SSS (SICK SINUS SYNDROME) (HCC): ICD-10-CM

## 2023-07-17 DIAGNOSIS — I48.0 PAF (PAROXYSMAL ATRIAL FIBRILLATION) (HCC): Primary | ICD-10-CM

## 2023-07-17 DIAGNOSIS — E78.2 MIXED HYPERLIPIDEMIA: ICD-10-CM

## 2023-07-17 DIAGNOSIS — Z79.01 CHRONIC ANTICOAGULATION: ICD-10-CM

## 2023-07-17 DIAGNOSIS — I10 ESSENTIAL HYPERTENSION: ICD-10-CM

## 2023-07-17 DIAGNOSIS — I50.32 CHRONIC DIASTOLIC CONGESTIVE HEART FAILURE (HCC): ICD-10-CM

## 2023-07-17 PROCEDURE — 3077F SYST BP >= 140 MM HG: CPT | Performed by: FAMILY MEDICINE

## 2023-07-17 PROCEDURE — 99214 OFFICE O/P EST MOD 30 MIN: CPT | Performed by: FAMILY MEDICINE

## 2023-07-17 PROCEDURE — 1123F ACP DISCUSS/DSCN MKR DOCD: CPT | Performed by: FAMILY MEDICINE

## 2023-07-17 PROCEDURE — 99211 OFF/OP EST MAY X REQ PHY/QHP: CPT | Performed by: FAMILY MEDICINE

## 2023-07-17 PROCEDURE — 1036F TOBACCO NON-USER: CPT | Performed by: FAMILY MEDICINE

## 2023-07-17 PROCEDURE — G8427 DOCREV CUR MEDS BY ELIG CLIN: HCPCS | Performed by: FAMILY MEDICINE

## 2023-07-17 PROCEDURE — 3080F DIAST BP >= 90 MM HG: CPT | Performed by: FAMILY MEDICINE

## 2023-07-17 PROCEDURE — 3017F COLORECTAL CA SCREEN DOC REV: CPT | Performed by: FAMILY MEDICINE

## 2023-07-17 PROCEDURE — G8417 CALC BMI ABV UP PARAM F/U: HCPCS | Performed by: FAMILY MEDICINE

## 2023-07-17 NOTE — PATIENT INSTRUCTIONS
SURVEY:    You may be receiving a survey from [employer] regarding your visit today. Please complete the survey to enable us to provide the highest quality of care to you and your family. If you cannot score us a very good on any question, please call the office to discuss how we could have made your experience a very good one. Thank you.

## 2023-07-17 NOTE — PROGRESS NOTES
Mariano Ponce am scribing for and in the presence of Bruce Gray MD, MS, F.A.C.C..    Patient: Toñito Macias  : 1950  Date of Visit: 2023    REASON FOR VISIT / CONSULTATION: Atrial Fibrillation (HX: PAF. Pt states he is doing well. Denies: CP, Palpitations, Lightheaded/ dizziness, SOB. )     History of Present Illness:        Dear Brendan Rodriges MD,      I had the pleasure of seeing Toñito Macias in my office today. Mr. Thomas Carmona is a 67 y.o. male  with a history of a dual chamber Biotronik pacemaker for sick sinus syndrome. He also was a smoker many years ago. His echocardiogram on 2017 showed an ejection fraction of 60%. He also has a history of paroxysmal atrial fibrillation which has been fairly rare in the past and has been treated with Eliquis. Normal stress and echo done in 2021 while in [place] He did have syncope in February while in [place] which occurred suddenly after getting up to use the bathroom. He had some pretty severe episodes of lightheadedness and dizziness with recurrent short episodes of loss of consciousness. He was admitted and he had stress test and echo done then and was told both where normal. IP from 2023-2023 due to syncope, before that he was recently hospitalized due to [place] [address] & was transferred to [facility]. V's at that time. Echocardiogram done on 2023: EF of >65%. LV wall thickness is mildly increased. Mildly dilated RV. Mild tricuspid regurgitation. Pacemaker interrogated in office on 2023: increased rate response from medium to be more sensitive. Pacemaker interrogated on 5/10/2023 no atrial fibrillation seen. Decreased his base rate back down to 60 bpm. Admitted on 2023-2023: related to abnormal kidney function. Stable at this time now. He did have a bout of chest pain while he was in the hospital also.  Echo done on 2023 global left ventricular systolic function appears preserved with an estimated

## 2023-07-18 ENCOUNTER — OFFICE VISIT (OUTPATIENT)
Dept: PRIMARY CARE CLINIC | Age: 73
End: 2023-07-18
Payer: MEDICARE

## 2023-07-18 VITALS
SYSTOLIC BLOOD PRESSURE: 146 MMHG | OXYGEN SATURATION: 99 % | BODY MASS INDEX: 29.8 KG/M2 | DIASTOLIC BLOOD PRESSURE: 94 MMHG | HEART RATE: 73 BPM | WEIGHT: 173.6 LBS | RESPIRATION RATE: 16 BRPM

## 2023-07-18 DIAGNOSIS — E03.9 HYPOTHYROIDISM, UNSPECIFIED TYPE: ICD-10-CM

## 2023-07-18 DIAGNOSIS — I48.0 PAF (PAROXYSMAL ATRIAL FIBRILLATION) (HCC): ICD-10-CM

## 2023-07-18 DIAGNOSIS — E78.5 HYPERLIPIDEMIA, UNSPECIFIED HYPERLIPIDEMIA TYPE: ICD-10-CM

## 2023-07-18 DIAGNOSIS — I10 ESSENTIAL HYPERTENSION: Primary | ICD-10-CM

## 2023-07-18 DIAGNOSIS — R73.9 HYPERGLYCEMIA: ICD-10-CM

## 2023-07-18 PROCEDURE — 1123F ACP DISCUSS/DSCN MKR DOCD: CPT | Performed by: STUDENT IN AN ORGANIZED HEALTH CARE EDUCATION/TRAINING PROGRAM

## 2023-07-18 PROCEDURE — 1036F TOBACCO NON-USER: CPT | Performed by: STUDENT IN AN ORGANIZED HEALTH CARE EDUCATION/TRAINING PROGRAM

## 2023-07-18 PROCEDURE — G8427 DOCREV CUR MEDS BY ELIG CLIN: HCPCS | Performed by: STUDENT IN AN ORGANIZED HEALTH CARE EDUCATION/TRAINING PROGRAM

## 2023-07-18 PROCEDURE — 99214 OFFICE O/P EST MOD 30 MIN: CPT | Performed by: STUDENT IN AN ORGANIZED HEALTH CARE EDUCATION/TRAINING PROGRAM

## 2023-07-18 PROCEDURE — G8417 CALC BMI ABV UP PARAM F/U: HCPCS | Performed by: STUDENT IN AN ORGANIZED HEALTH CARE EDUCATION/TRAINING PROGRAM

## 2023-07-18 PROCEDURE — 3077F SYST BP >= 140 MM HG: CPT | Performed by: STUDENT IN AN ORGANIZED HEALTH CARE EDUCATION/TRAINING PROGRAM

## 2023-07-18 PROCEDURE — 3017F COLORECTAL CA SCREEN DOC REV: CPT | Performed by: STUDENT IN AN ORGANIZED HEALTH CARE EDUCATION/TRAINING PROGRAM

## 2023-07-18 PROCEDURE — 3080F DIAST BP >= 90 MM HG: CPT | Performed by: STUDENT IN AN ORGANIZED HEALTH CARE EDUCATION/TRAINING PROGRAM

## 2023-07-18 NOTE — PROGRESS NOTES
[address], [place] [address] [place], [place], [address] Tristian Lopez is a 67 y.o. male with  has a past medical history of Asthma, BPH (benign prostatic hyperplasia), Calculus of gallbladder without cholecystitis without obstruction, Epigastric pain, Fracture of L1 vertebra ([address]), GERD (gastroesophageal reflux disease), Gout, High altitude pulmonary edema, Hyperlipidemia, Hypertension, Myalgia, lower leg, Radiculopathy of leg, Sleep apnea, Thyroid disease, and Wrist fracture, left. Presented to the office today for:  Chief Complaint   Patient presents with    Hypertension    Congestive Heart Failure           Atrial Fibrillation       Assessment/Plan   1. Essential hypertension  -     CBC with Auto Differential; Future  -     Comprehensive Metabolic Panel; Future  -     Uric Acid; Future  2. PAF (paroxysmal atrial fibrillation) (HCC)  -     Uric Acid; Future  3. Hypothyroidism, unspecified type  -     TSH With Reflex Ft4; Future  -     Uric Acid; Future  4. Hyperlipidemia, unspecified hyperlipidemia type  -     Lipid Panel; Future  5. Hyperglycemia  -     Hemoglobin A1C; Future  Return in about 3 months (around 10/18/2023) for F/U Meds. HTN/Afib - continue w/ valsartan at the current dose, cont w/ Eliquis at 5mg, f/u with Cardiology per prior plans. Hyperlipidemia - continue w/ statin at the current dose, well controlled. Hypothyroidism - continue w/ synthroid at 125mcg PO daily  Continue w/ physical activity, 40min+, (e.g. lawn work, exercise equipment, treadmill, rowing machine, planking)     All patient questions answered. Pt voiced understanding. There are no discontinued medications. Patient received counseling on the following healthy behaviors: nutrition, exercise and medication adherence.  I encouraged and discussed lifestyle modifications including diet and exercise and the patient was agreeable to making positive/beneficial changes to both to

## 2023-07-18 NOTE — PATIENT INSTRUCTIONS
SURVEY:    You may be receiving a survey from [facility] regarding your visit today. Please complete the survey to enable us to provide the highest quality of care to you and your family. If you cannot score us a very good on any question, please call the office to discuss how we could have made your experience a very good one. Thank you.

## 2023-08-14 ENCOUNTER — HOSPITAL ENCOUNTER (OUTPATIENT)
Age: 73
Discharge: HOME | End: 2023-08-14
Payer: MEDICARE

## 2023-08-14 DIAGNOSIS — I10 PRIMARY HYPERTENSION: ICD-10-CM

## 2023-08-14 DIAGNOSIS — N17.9 AKI (ACUTE KIDNEY INJURY) (HCC): ICD-10-CM

## 2023-08-14 LAB
ALBUMIN SERPL-MCNC: 4.5 G/DL (ref 3.5–5.2)
ALBUMIN/GLOB SERPL: 1.7 {RATIO} (ref 1–2.5)
ALP SERPL-CCNC: 62 U/L (ref 40–129)
ALT SERPL-CCNC: 16 U/L (ref 5–41)
ANION GAP SERPL CALCULATED.3IONS-SCNC: 7 MMOL/L (ref 9–17)
AST SERPL-CCNC: 24 U/L
BILIRUB DIRECT SERPL-MCNC: <0.1 MG/DL
BILIRUB INDIRECT SERPL-MCNC: NORMAL MG/DL (ref 0–1)
BILIRUB SERPL-MCNC: 0.9 MG/DL (ref 0.3–1.2)
BUN SERPL-MCNC: 19 MG/DL (ref 8–23)
BUN/CREAT SERPL: 16 (ref 9–20)
CALCIUM SERPL-MCNC: 9.8 MG/DL (ref 8.6–10.4)
CHLORIDE SERPL-SCNC: 107 MMOL/L (ref 98–107)
CO2 SERPL-SCNC: 30 MMOL/L (ref 20–31)
CREAT SERPL-MCNC: 1.2 MG/DL (ref 0.7–1.2)
CREAT UR-MCNC: 90.6 MG/DL (ref 39–259)
ERYTHROCYTE [DISTWIDTH] IN BLOOD BY AUTOMATED COUNT: 11.8 % (ref 11.8–14.4)
GFR SERPL CREATININE-BSD FRML MDRD: >60 ML/MIN/1.73M2
GLUCOSE SERPL-MCNC: 92 MG/DL (ref 70–99)
HCT VFR BLD AUTO: 39.5 % (ref 40.7–50.3)
HGB BLD-MCNC: 13.6 G/DL (ref 13–17)
MCH RBC QN AUTO: 30.2 PG (ref 25.2–33.5)
MCHC RBC AUTO-ENTMCNC: 34.4 G/DL (ref 28.4–34.8)
MCV RBC AUTO: 87.6 FL (ref 82.6–102.9)
NRBC BLD-RTO: 0 PER 100 WBC
PLATELET # BLD AUTO: 156 K/UL (ref 138–453)
PMV BLD AUTO: 9.3 FL (ref 8.1–13.5)
POTASSIUM SERPL-SCNC: 4.5 MMOL/L (ref 3.7–5.3)
PROT SERPL-MCNC: 7.2 G/DL (ref 6.4–8.3)
RBC # BLD AUTO: 4.51 M/UL (ref 4.21–5.77)
SODIUM SERPL-SCNC: 144 MMOL/L (ref 135–144)
TOTAL PROTEIN, URINE: 5 MG/DL
URINE TOTAL PROTEIN CREATININE RATIO: 0.06 (ref 0–0.2)
WBC OTHER # BLD: 6.2 K/UL (ref 3.5–11.3)

## 2023-08-14 PROCEDURE — 84156 ASSAY OF PROTEIN URINE: CPT

## 2023-08-14 PROCEDURE — 80048 BASIC METABOLIC PNL TOTAL CA: CPT

## 2023-08-14 PROCEDURE — 85027 COMPLETE CBC AUTOMATED: CPT

## 2023-08-14 PROCEDURE — 36415 COLL VENOUS BLD VENIPUNCTURE: CPT

## 2023-08-14 PROCEDURE — 82570 ASSAY OF URINE CREATININE: CPT

## 2023-08-14 PROCEDURE — 80076 HEPATIC FUNCTION PANEL: CPT

## 2023-08-15 ENCOUNTER — OFFICE VISIT (OUTPATIENT)
Dept: NEPHROLOGY | Age: 73
End: 2023-08-15
Payer: MEDICARE

## 2023-08-15 VITALS
HEART RATE: 86 BPM | SYSTOLIC BLOOD PRESSURE: 163 MMHG | TEMPERATURE: 97.7 F | HEIGHT: 64 IN | WEIGHT: 171.2 LBS | BODY MASS INDEX: 29.23 KG/M2 | RESPIRATION RATE: 16 BRPM | DIASTOLIC BLOOD PRESSURE: 93 MMHG

## 2023-08-15 DIAGNOSIS — N18.2 CKD (CHRONIC KIDNEY DISEASE), STAGE II: Primary | ICD-10-CM

## 2023-08-15 DIAGNOSIS — E79.0 HYPERURICEMIA: ICD-10-CM

## 2023-08-15 DIAGNOSIS — I12.9 HYPERTENSIVE RENAL DISEASE, STAGE 1 THROUGH STAGE 4 OR UNSPECIFIED CHRONIC KIDNEY DISEASE: ICD-10-CM

## 2023-08-15 PROCEDURE — 3077F SYST BP >= 140 MM HG: CPT | Performed by: INTERNAL MEDICINE

## 2023-08-15 PROCEDURE — G8417 CALC BMI ABV UP PARAM F/U: HCPCS | Performed by: INTERNAL MEDICINE

## 2023-08-15 PROCEDURE — 99213 OFFICE O/P EST LOW 20 MIN: CPT | Performed by: INTERNAL MEDICINE

## 2023-08-15 PROCEDURE — 1036F TOBACCO NON-USER: CPT | Performed by: INTERNAL MEDICINE

## 2023-08-15 PROCEDURE — 99214 OFFICE O/P EST MOD 30 MIN: CPT | Performed by: INTERNAL MEDICINE

## 2023-08-15 PROCEDURE — G8427 DOCREV CUR MEDS BY ELIG CLIN: HCPCS | Performed by: INTERNAL MEDICINE

## 2023-08-15 PROCEDURE — 1123F ACP DISCUSS/DSCN MKR DOCD: CPT | Performed by: INTERNAL MEDICINE

## 2023-08-15 PROCEDURE — 3017F COLORECTAL CA SCREEN DOC REV: CPT | Performed by: INTERNAL MEDICINE

## 2023-08-15 PROCEDURE — 3080F DIAST BP >= 90 MM HG: CPT | Performed by: INTERNAL MEDICINE

## 2023-08-15 NOTE — PATIENT INSTRUCTIONS
SURVEY:    You may be receiving a survey from [employer] regarding your visit today. Please complete the survey to enable us to provide the highest quality of care to you and your family. If you cannot score us a very good on any question, please call the office to discuss how we could have made your experience a very good one. Thank you.

## 2023-08-15 NOTE — PROGRESS NOTES
[place] [facility] [facility] KIDNEY AND HYPERTENSION  [address] [place] [facility] [address]  Dept: [phone]  Office Progress Note  8/15/2023 12:33 PM      Pt Name:    Amara Camera:    1950  Primary Care Physician:  Antoine Galvez MD     Chief Complaint:   Chief Complaint   Patient presents with    Follow-up     Patient here for follow up for JOEL. Pt denies any kidney pain or swelling. Background Information/Interval History:   68 yo male with hx BPH, renal cysts, HTN who was admitted with JOEL thought to be due to ATN. He was managed supportively. His creatinine improved on discharge. Was noted to have mild proteinuria on UPCR with negative disptick. SPEP/MARTINEZ suggested monoclonal response. Was referred to hematology and dxed with MGUS. He is here for follow-up. BP today is 160/109. He says BP at home was in 130 range. He says he did not tolerate beta blocker or clonidine in the past. He was on valsartan in the past.     Here for follow-up. Doing okay. Bp is high in office today but at home most numbers are in 120-130s. I reviewed his home log. He feels well. No urinary complaints. He exercises about an hour per day.       Past History:  Past Medical History:   Diagnosis Date    Asthma     BPH (benign prostatic hyperplasia)     Calculus of gallbladder without cholecystitis without obstruction 4/5/2023    Epigastric pain 4/12/2023    Fracture of L1 vertebra ([address]) 10/2010    GERD (gastroesophageal reflux disease)     Gout     High altitude pulmonary edema 8/2010    Hyperlipidemia     Hypertension     Myalgia, lower leg 4/20/2023    Radiculopathy of leg     left leg    Sleep apnea     Treated C-PAP 10 years    Thyroid disease     Wrist fracture, left 10/2010     Past Surgical History:   Procedure Laterality Date    COLON SURGERY      perirectal abcess    COLONOSCOPY          COLONOSCOPY  02/28/2019

## 2023-10-29 SDOH — HEALTH STABILITY: PHYSICAL HEALTH: ON AVERAGE, HOW MANY DAYS PER WEEK DO YOU ENGAGE IN MODERATE TO STRENUOUS EXERCISE (LIKE A BRISK WALK)?: 6 DAYS

## 2023-10-29 SDOH — HEALTH STABILITY: PHYSICAL HEALTH: ON AVERAGE, HOW MANY MINUTES DO YOU ENGAGE IN EXERCISE AT THIS LEVEL?: 40 MIN

## 2023-10-29 ASSESSMENT — PATIENT HEALTH QUESTIONNAIRE - PHQ9
2. FEELING DOWN, DEPRESSED OR HOPELESS: 0
SUM OF ALL RESPONSES TO PHQ QUESTIONS 1-9: 0
SUM OF ALL RESPONSES TO PHQ QUESTIONS 1-9: 0
SUM OF ALL RESPONSES TO PHQ9 QUESTIONS 1 & 2: 0
1. LITTLE INTEREST OR PLEASURE IN DOING THINGS: 0
SUM OF ALL RESPONSES TO PHQ QUESTIONS 1-9: 0
SUM OF ALL RESPONSES TO PHQ QUESTIONS 1-9: 0

## 2023-10-29 ASSESSMENT — LIFESTYLE VARIABLES
HOW OFTEN DO YOU HAVE A DRINK CONTAINING ALCOHOL: 4
HOW MANY STANDARD DRINKS CONTAINING ALCOHOL DO YOU HAVE ON A TYPICAL DAY: 1
HOW MANY STANDARD DRINKS CONTAINING ALCOHOL DO YOU HAVE ON A TYPICAL DAY: 1 OR 2
HOW OFTEN DO YOU HAVE SIX OR MORE DRINKS ON ONE OCCASION: 1
HOW OFTEN DO YOU HAVE A DRINK CONTAINING ALCOHOL: 2-3 TIMES A WEEK

## 2023-10-31 ENCOUNTER — OFFICE VISIT (OUTPATIENT)
Dept: PRIMARY CARE CLINIC | Age: 73
End: 2023-10-31
Payer: MEDICARE

## 2023-10-31 VITALS
HEART RATE: 76 BPM | BODY MASS INDEX: 29.19 KG/M2 | HEIGHT: 64 IN | WEIGHT: 171 LBS | DIASTOLIC BLOOD PRESSURE: 90 MMHG | OXYGEN SATURATION: 98 % | SYSTOLIC BLOOD PRESSURE: 146 MMHG

## 2023-10-31 DIAGNOSIS — Z00.00 MEDICARE ANNUAL WELLNESS VISIT, SUBSEQUENT: Primary | ICD-10-CM

## 2023-10-31 PROCEDURE — 3080F DIAST BP >= 90 MM HG: CPT | Performed by: STUDENT IN AN ORGANIZED HEALTH CARE EDUCATION/TRAINING PROGRAM

## 2023-10-31 PROCEDURE — G8484 FLU IMMUNIZE NO ADMIN: HCPCS | Performed by: STUDENT IN AN ORGANIZED HEALTH CARE EDUCATION/TRAINING PROGRAM

## 2023-10-31 PROCEDURE — G0439 PPPS, SUBSEQ VISIT: HCPCS | Performed by: STUDENT IN AN ORGANIZED HEALTH CARE EDUCATION/TRAINING PROGRAM

## 2023-10-31 PROCEDURE — 3077F SYST BP >= 140 MM HG: CPT | Performed by: STUDENT IN AN ORGANIZED HEALTH CARE EDUCATION/TRAINING PROGRAM

## 2023-10-31 PROCEDURE — 1123F ACP DISCUSS/DSCN MKR DOCD: CPT | Performed by: STUDENT IN AN ORGANIZED HEALTH CARE EDUCATION/TRAINING PROGRAM

## 2023-10-31 PROCEDURE — 3017F COLORECTAL CA SCREEN DOC REV: CPT | Performed by: STUDENT IN AN ORGANIZED HEALTH CARE EDUCATION/TRAINING PROGRAM

## 2023-10-31 RX ORDER — ATORVASTATIN CALCIUM 10 MG/1
TABLET, FILM COATED ORAL
COMMUNITY
Start: 2023-08-18

## 2023-10-31 RX ORDER — VALSARTAN 80 MG/1
80 TABLET ORAL DAILY
Qty: 90 TABLET | Refills: 3 | Status: SHIPPED | OUTPATIENT
Start: 2023-10-31

## 2023-10-31 RX ORDER — PANTOPRAZOLE SODIUM 40 MG/1
TABLET, DELAYED RELEASE ORAL
COMMUNITY
Start: 2023-08-18

## 2023-11-06 ENCOUNTER — OFFICE VISIT (OUTPATIENT)
Dept: PULMONOLOGY | Age: 73
End: 2023-11-06
Payer: MEDICARE

## 2023-11-06 VITALS
SYSTOLIC BLOOD PRESSURE: 135 MMHG | OXYGEN SATURATION: 97 % | TEMPERATURE: 97 F | BODY MASS INDEX: 29.18 KG/M2 | HEIGHT: 64 IN | HEART RATE: 83 BPM | RESPIRATION RATE: 16 BRPM | DIASTOLIC BLOOD PRESSURE: 89 MMHG | WEIGHT: 170.9 LBS

## 2023-11-06 DIAGNOSIS — I31.39 PERICARDIAL EFFUSION WITH CARDIAC TAMPONADE: ICD-10-CM

## 2023-11-06 DIAGNOSIS — J45.40 MODERATE PERSISTENT ASTHMA WITHOUT COMPLICATION: Primary | ICD-10-CM

## 2023-11-06 DIAGNOSIS — Z95.0 PACEMAKER: ICD-10-CM

## 2023-11-06 DIAGNOSIS — I48.0 PAROXYSMAL ATRIAL FIBRILLATION (HCC): ICD-10-CM

## 2023-11-06 DIAGNOSIS — G47.33 OSA (OBSTRUCTIVE SLEEP APNEA): ICD-10-CM

## 2023-11-06 DIAGNOSIS — K21.9 GASTROESOPHAGEAL REFLUX DISEASE, UNSPECIFIED WHETHER ESOPHAGITIS PRESENT: ICD-10-CM

## 2023-11-06 DIAGNOSIS — E66.3 OVERWEIGHT (BMI 25.0-29.9): ICD-10-CM

## 2023-11-06 DIAGNOSIS — I31.4 PERICARDIAL EFFUSION WITH CARDIAC TAMPONADE: ICD-10-CM

## 2023-11-06 PROCEDURE — 99213 OFFICE O/P EST LOW 20 MIN: CPT | Performed by: INTERNAL MEDICINE

## 2023-11-06 PROCEDURE — G8427 DOCREV CUR MEDS BY ELIG CLIN: HCPCS | Performed by: INTERNAL MEDICINE

## 2023-11-06 PROCEDURE — 1036F TOBACCO NON-USER: CPT | Performed by: INTERNAL MEDICINE

## 2023-11-06 PROCEDURE — 3078F DIAST BP <80 MM HG: CPT | Performed by: INTERNAL MEDICINE

## 2023-11-06 PROCEDURE — 3074F SYST BP LT 130 MM HG: CPT | Performed by: INTERNAL MEDICINE

## 2023-11-06 PROCEDURE — G8417 CALC BMI ABV UP PARAM F/U: HCPCS | Performed by: INTERNAL MEDICINE

## 2023-11-06 PROCEDURE — G8484 FLU IMMUNIZE NO ADMIN: HCPCS | Performed by: INTERNAL MEDICINE

## 2023-11-06 PROCEDURE — 99214 OFFICE O/P EST MOD 30 MIN: CPT | Performed by: INTERNAL MEDICINE

## 2023-11-06 PROCEDURE — 1123F ACP DISCUSS/DSCN MKR DOCD: CPT | Performed by: INTERNAL MEDICINE

## 2023-11-06 PROCEDURE — 3017F COLORECTAL CA SCREEN DOC REV: CPT | Performed by: INTERNAL MEDICINE

## 2023-11-06 NOTE — PATIENT INSTRUCTIONS
SURVEY:    You may be receiving a survey from [employer] regarding your visit today. Please complete the survey to enable us to provide the highest quality of care to you and your family. If you cannot score us a very good on any question, please call the office to discuss how we could have made your experience a very good one. Thank you. Please recheck your blood pressure when you go home and make sure you take your prescribed medication if applicable . Please follow up with your PCP or ER if needed.

## 2023-11-06 NOTE — PROGRESS NOTES
Good color, turgor and pigmentation. No lesions or scars. No cyanosis or clubbing  Neurological/Psychiatric: The patient's general behavior, level of consciousness, thought content and emotional status is normal.          DATA:     Pulmonary function tests: none        CXR: REVIEWED: In April 2023 showed-  1. Chest exam reveals no airspace disease. Interval improved aeration of  the lung bases. 2.  Moderate amount of gas and stool throughout the colon. No dilated small  bowel. IMPRESSION:   1. Moderate persistent asthma without complication    2. Gastroesophageal reflux disease, unspecified whether esophagitis present    3. Paroxysmal atrial fibrillation (HCC)    4. Pericardial effusion with cardiac tamponade    5. Pacemaker    6. NICHOLE (obstructive sleep apnea)    7. Overweight (BMI 25.0-29. 9)                   PLAN:       Refills were provided -none  Patient was recommended to have prednisone and an antibiotic available for use during an exacerbation  Educated and clarified the medication use. Discussed use, benefit, and side effects of prescribed medications. Barriers to medication compliance addressed. Tyreljeanniegloria Ross received counseling on the following healthy behaviors: nutrition, exercise and medication adherence  Recommend flu vaccination in the fall annually. Recommendations given regarding pneumococcal vaccinations. Had the COVID-19 vaccination. Recommended Bi Valent booster  Patient is not uptodate with vaccinations from pulmonary perspective. Maintain an active lifestyle. continue smoking cessation. Patient was educated on how to use the respiratory medications. All the questions that the patient has had were answered to   his satisfaction. Home O2 evaluation was done.   Supplemental oxygen was not indicated  After reviewing the patient's smoking history and his age patient does not meet the criteria for lung cancer screening due to not having 20-pack-year smoking history

## 2023-11-13 ENCOUNTER — HOSPITAL ENCOUNTER (OUTPATIENT)
Age: 73
Discharge: HOME | End: 2023-11-13
Payer: MEDICARE

## 2023-11-13 DIAGNOSIS — R73.9 HYPERGLYCEMIA: ICD-10-CM

## 2023-11-13 DIAGNOSIS — E03.9 HYPOTHYROIDISM, UNSPECIFIED TYPE: ICD-10-CM

## 2023-11-13 DIAGNOSIS — I10 ESSENTIAL HYPERTENSION: ICD-10-CM

## 2023-11-13 DIAGNOSIS — I48.0 PAF (PAROXYSMAL ATRIAL FIBRILLATION) (HCC): ICD-10-CM

## 2023-11-13 DIAGNOSIS — E78.5 HYPERLIPIDEMIA, UNSPECIFIED HYPERLIPIDEMIA TYPE: ICD-10-CM

## 2023-11-13 LAB
ALBUMIN SERPL-MCNC: 4.7 G/DL (ref 3.5–5.2)
ALBUMIN/GLOB SERPL: 1.9 {RATIO} (ref 1–2.5)
ALP SERPL-CCNC: 79 U/L (ref 40–129)
ALT SERPL-CCNC: 22 U/L (ref 5–41)
ANION GAP SERPL CALCULATED.3IONS-SCNC: 9 MMOL/L (ref 9–17)
AST SERPL-CCNC: 25 U/L
BASOPHILS # BLD: <0.03 K/UL (ref 0–0.2)
BASOPHILS NFR BLD: 0 % (ref 0–2)
BILIRUB SERPL-MCNC: 0.5 MG/DL (ref 0.3–1.2)
BUN SERPL-MCNC: 20 MG/DL (ref 8–23)
BUN/CREAT SERPL: 18 (ref 9–20)
CALCIUM SERPL-MCNC: 9.5 MG/DL (ref 8.6–10.4)
CHLORIDE SERPL-SCNC: 106 MMOL/L (ref 98–107)
CHOLEST SERPL-MCNC: 124 MG/DL
CHOLESTEROL/HDL RATIO: 2.6
CO2 SERPL-SCNC: 27 MMOL/L (ref 20–31)
CREAT SERPL-MCNC: 1.1 MG/DL (ref 0.7–1.2)
EOSINOPHIL # BLD: 0.07 K/UL (ref 0–0.44)
EOSINOPHILS RELATIVE PERCENT: 2 % (ref 1–4)
ERYTHROCYTE [DISTWIDTH] IN BLOOD BY AUTOMATED COUNT: 12.9 % (ref 11.8–14.4)
EST. AVERAGE GLUCOSE BLD GHB EST-MCNC: 85 MG/DL
GFR SERPL CREATININE-BSD FRML MDRD: >60 ML/MIN/1.73M2
GLUCOSE SERPL-MCNC: 90 MG/DL (ref 70–99)
HBA1C MFR BLD: 4.6 % (ref 4–6)
HCT VFR BLD AUTO: 41.2 % (ref 40.7–50.3)
HDLC SERPL-MCNC: 48 MG/DL
HGB BLD-MCNC: 13.6 G/DL (ref 13–17)
IMM GRANULOCYTES # BLD AUTO: <0.03 K/UL (ref 0–0.3)
IMM GRANULOCYTES NFR BLD: 0 %
LDLC SERPL CALC-MCNC: 62 MG/DL (ref 0–130)
LYMPHOCYTES NFR BLD: 2.2 K/UL (ref 1.1–3.7)
LYMPHOCYTES RELATIVE PERCENT: 48 % (ref 24–43)
MCH RBC QN AUTO: 29.5 PG (ref 25.2–33.5)
MCHC RBC AUTO-ENTMCNC: 33 G/DL (ref 28.4–34.8)
MCV RBC AUTO: 89.4 FL (ref 82.6–102.9)
MONOCYTES NFR BLD: 0.47 K/UL (ref 0.1–1.2)
MONOCYTES NFR BLD: 10 % (ref 3–12)
NEUTROPHILS NFR BLD: 40 % (ref 36–65)
NEUTS SEG NFR BLD: 1.87 K/UL (ref 1.5–8.1)
NRBC BLD-RTO: 0 PER 100 WBC
PLATELET # BLD AUTO: 149 K/UL (ref 138–453)
PMV BLD AUTO: 9.4 FL (ref 8.1–13.5)
POTASSIUM SERPL-SCNC: 4.6 MMOL/L (ref 3.7–5.3)
PROT SERPL-MCNC: 7.2 G/DL (ref 6.4–8.3)
RBC # BLD AUTO: 4.61 M/UL (ref 4.21–5.77)
SODIUM SERPL-SCNC: 142 MMOL/L (ref 135–144)
TRIGL SERPL-MCNC: 72 MG/DL
TSH SERPL DL<=0.05 MIU/L-ACNC: 0.39 UIU/ML (ref 0.3–5)
URATE SERPL-MCNC: 6.8 MG/DL (ref 3.4–7)
WBC OTHER # BLD: 4.6 K/UL (ref 3.5–11.3)

## 2023-11-13 PROCEDURE — 84443 ASSAY THYROID STIM HORMONE: CPT

## 2023-11-13 PROCEDURE — 80053 COMPREHEN METABOLIC PANEL: CPT

## 2023-11-13 PROCEDURE — 85025 COMPLETE CBC W/AUTO DIFF WBC: CPT

## 2023-11-13 PROCEDURE — 80061 LIPID PANEL: CPT

## 2023-11-13 PROCEDURE — 36415 COLL VENOUS BLD VENIPUNCTURE: CPT

## 2023-11-13 PROCEDURE — 84550 ASSAY OF BLOOD/URIC ACID: CPT

## 2023-11-13 PROCEDURE — 83036 HEMOGLOBIN GLYCOSYLATED A1C: CPT

## 2023-11-15 ENCOUNTER — OFFICE VISIT (OUTPATIENT)
Dept: ONCOLOGY | Age: 73
End: 2023-11-15
Payer: COMMERCIAL

## 2023-11-15 VITALS
BODY MASS INDEX: 29.7 KG/M2 | TEMPERATURE: 96.9 F | RESPIRATION RATE: 18 BRPM | WEIGHT: 173 LBS | DIASTOLIC BLOOD PRESSURE: 86 MMHG | HEART RATE: 88 BPM | SYSTOLIC BLOOD PRESSURE: 134 MMHG

## 2023-11-15 DIAGNOSIS — D47.2 MGUS (MONOCLONAL GAMMOPATHY OF UNKNOWN SIGNIFICANCE): ICD-10-CM

## 2023-11-15 DIAGNOSIS — G62.9 SENSORY NEUROPATHY: ICD-10-CM

## 2023-11-15 DIAGNOSIS — D69.6 THROMBOCYTOPENIA (HCC): Primary | ICD-10-CM

## 2023-11-15 PROCEDURE — 99213 OFFICE O/P EST LOW 20 MIN: CPT | Performed by: INTERNAL MEDICINE

## 2023-11-15 PROCEDURE — 3079F DIAST BP 80-89 MM HG: CPT | Performed by: INTERNAL MEDICINE

## 2023-11-15 PROCEDURE — 3075F SYST BP GE 130 - 139MM HG: CPT | Performed by: INTERNAL MEDICINE

## 2023-11-15 PROCEDURE — 1123F ACP DISCUSS/DSCN MKR DOCD: CPT | Performed by: INTERNAL MEDICINE

## 2023-11-22 RX ORDER — AZITHROMYCIN 250 MG/1
250 TABLET, FILM COATED ORAL SEE ADMIN INSTRUCTIONS
Qty: 6 TABLET | Refills: 0 | Status: SHIPPED | OUTPATIENT
Start: 2023-11-22 | End: 2023-11-27

## 2023-11-22 RX ORDER — METHYLPREDNISOLONE 4 MG/1
TABLET ORAL
Qty: 1 KIT | Refills: 0 | Status: SHIPPED | OUTPATIENT
Start: 2023-11-22 | End: 2023-11-28

## 2023-11-29 RX ORDER — FINASTERIDE 5 MG/1
5 TABLET, FILM COATED ORAL DAILY
Qty: 90 TABLET | Refills: 3 | Status: SHIPPED | OUTPATIENT
Start: 2023-11-29

## 2023-11-29 RX ORDER — FLUTICASONE FUROATE 100 UG/1
1 POWDER RESPIRATORY (INHALATION) DAILY
Qty: 30 EACH | Refills: 5 | Status: SHIPPED | OUTPATIENT
Start: 2023-11-29

## 2023-11-29 RX ORDER — ALBUTEROL SULFATE 2.5 MG/3ML
2.5 SOLUTION RESPIRATORY (INHALATION) 4 TIMES DAILY PRN
Qty: 120 EACH | Refills: 3 | Status: SHIPPED | OUTPATIENT
Start: 2023-11-29 | End: 2023-12-01 | Stop reason: SDUPTHER

## 2023-11-29 RX ORDER — TAMSULOSIN HYDROCHLORIDE 0.4 MG/1
0.4 CAPSULE ORAL DAILY
Qty: 90 CAPSULE | Refills: 3 | Status: SHIPPED | OUTPATIENT
Start: 2023-11-29

## 2023-11-30 PROBLEM — Z00.00 MEDICARE ANNUAL WELLNESS VISIT, SUBSEQUENT: Status: RESOLVED | Noted: 2023-10-31 | Resolved: 2023-11-30

## 2023-11-30 RX ORDER — METRONIDAZOLE 500 MG/1
500 TABLET ORAL 3 TIMES DAILY
Qty: 30 TABLET | Refills: 0 | Status: SHIPPED | OUTPATIENT
Start: 2023-11-30 | End: 2023-12-10

## 2023-12-01 DIAGNOSIS — J45.20 INTERMITTENT ASTHMA, UNSPECIFIED ASTHMA SEVERITY, UNSPECIFIED WHETHER COMPLICATED: Primary | ICD-10-CM

## 2023-12-01 RX ORDER — ALBUTEROL SULFATE 2.5 MG/3ML
2.5 SOLUTION RESPIRATORY (INHALATION) 4 TIMES DAILY PRN
Qty: 120 EACH | Refills: 3 | Status: SHIPPED | OUTPATIENT
Start: 2023-12-01

## 2023-12-01 RX ORDER — ALBUTEROL SULFATE 90 UG/1
2 AEROSOL, METERED RESPIRATORY (INHALATION) EVERY 6 HOURS PRN
Qty: 18 G | Refills: 2 | Status: SHIPPED | OUTPATIENT
Start: 2023-12-01

## 2023-12-04 RX ORDER — ALLOPURINOL 100 MG/1
100 TABLET ORAL DAILY
Qty: 90 TABLET | Refills: 1 | Status: SHIPPED | OUTPATIENT
Start: 2023-12-04

## 2024-01-08 PROCEDURE — 93294 REM INTERROG EVL PM/LDLS PM: CPT | Performed by: FAMILY MEDICINE

## 2024-01-09 ENCOUNTER — NURSE ONLY (OUTPATIENT)
Dept: CARDIOLOGY | Age: 74
End: 2024-01-09
Payer: MEDICARE

## 2024-01-09 DIAGNOSIS — Z95.0 PACEMAKER: ICD-10-CM

## 2024-01-09 DIAGNOSIS — I49.5 SSS (SICK SINUS SYNDROME) (HCC): Primary | ICD-10-CM

## 2024-02-12 DIAGNOSIS — M1A.9XX0 CHRONIC GOUT WITHOUT TOPHUS, UNSPECIFIED CAUSE, UNSPECIFIED SITE: ICD-10-CM

## 2024-02-12 DIAGNOSIS — I10 ESSENTIAL HYPERTENSION: Primary | ICD-10-CM

## 2024-02-12 DIAGNOSIS — E03.9 HYPOTHYROIDISM, UNSPECIFIED TYPE: ICD-10-CM

## 2024-02-12 DIAGNOSIS — E78.5 HYPERLIPIDEMIA, UNSPECIFIED HYPERLIPIDEMIA TYPE: ICD-10-CM

## 2024-02-12 RX ORDER — ALLOPURINOL 100 MG/1
100 TABLET ORAL DAILY
Qty: 90 TABLET | Refills: 1 | Status: SHIPPED | OUTPATIENT
Start: 2024-02-12

## 2024-02-12 RX ORDER — LEVOTHYROXINE SODIUM 0.12 MG/1
125 TABLET ORAL DAILY
Qty: 90 TABLET | Refills: 3 | Status: SHIPPED | OUTPATIENT
Start: 2024-02-12

## 2024-02-12 RX ORDER — VALSARTAN 80 MG/1
80 TABLET ORAL DAILY
Qty: 90 TABLET | Refills: 3 | Status: SHIPPED | OUTPATIENT
Start: 2024-02-12

## 2024-02-12 RX ORDER — ATORVASTATIN CALCIUM 10 MG/1
10 TABLET, FILM COATED ORAL DAILY
Qty: 90 TABLET | Refills: 3 | Status: SHIPPED | OUTPATIENT
Start: 2024-02-12

## 2024-03-11 DIAGNOSIS — E55.9 HYPOVITAMINOSIS D: ICD-10-CM

## 2024-03-11 DIAGNOSIS — I10 ESSENTIAL HYPERTENSION: Primary | ICD-10-CM

## 2024-03-11 DIAGNOSIS — D61.818 PANCYTOPENIA (HCC): ICD-10-CM

## 2024-03-11 DIAGNOSIS — E78.5 HYPERLIPIDEMIA, UNSPECIFIED HYPERLIPIDEMIA TYPE: ICD-10-CM

## 2024-03-11 DIAGNOSIS — R73.9 HYPERGLYCEMIA: ICD-10-CM

## 2024-03-11 DIAGNOSIS — I48.0 PAF (PAROXYSMAL ATRIAL FIBRILLATION) (HCC): ICD-10-CM

## 2024-03-15 ENCOUNTER — HOSPITAL ENCOUNTER (OUTPATIENT)
Age: 74
Discharge: HOME | End: 2024-03-15
Payer: MEDICARE

## 2024-03-15 DIAGNOSIS — I12.9 HYPERTENSIVE RENAL DISEASE, STAGE 1 THROUGH STAGE 4 OR UNSPECIFIED CHRONIC KIDNEY DISEASE: ICD-10-CM

## 2024-03-15 DIAGNOSIS — B97.89 MYALGIA DUE TO VIRAL INFECTION: ICD-10-CM

## 2024-03-15 DIAGNOSIS — I48.0 PAF (PAROXYSMAL ATRIAL FIBRILLATION) (HCC): ICD-10-CM

## 2024-03-15 DIAGNOSIS — E55.9 HYPOVITAMINOSIS D: ICD-10-CM

## 2024-03-15 DIAGNOSIS — E79.0 HYPERURICEMIA: ICD-10-CM

## 2024-03-15 DIAGNOSIS — I10 ESSENTIAL HYPERTENSION: ICD-10-CM

## 2024-03-15 DIAGNOSIS — D47.2 MGUS (MONOCLONAL GAMMOPATHY OF UNKNOWN SIGNIFICANCE): ICD-10-CM

## 2024-03-15 DIAGNOSIS — R73.9 HYPERGLYCEMIA: ICD-10-CM

## 2024-03-15 DIAGNOSIS — M79.10 MYALGIA DUE TO VIRAL INFECTION: ICD-10-CM

## 2024-03-15 DIAGNOSIS — N17.9 AKI (ACUTE KIDNEY INJURY) (HCC): ICD-10-CM

## 2024-03-15 DIAGNOSIS — U07.1 COVID-19: ICD-10-CM

## 2024-03-15 DIAGNOSIS — D61.818 PANCYTOPENIA (HCC): ICD-10-CM

## 2024-03-15 DIAGNOSIS — E78.5 HYPERLIPIDEMIA, UNSPECIFIED HYPERLIPIDEMIA TYPE: ICD-10-CM

## 2024-03-15 DIAGNOSIS — N18.2 CKD (CHRONIC KIDNEY DISEASE), STAGE II: ICD-10-CM

## 2024-03-15 LAB
25(OH)D3 SERPL-MCNC: 36.1 NG/ML (ref 30–100)
ALBUMIN PERCENT: NORMAL %
ALBUMIN SERPL-MCNC: 4.4 G/DL (ref 3.5–5.2)
ALBUMIN SERPL-MCNC: NORMAL G/DL
ALBUMIN/GLOB SERPL: 1.5 {RATIO} (ref 1–2.5)
ALP SERPL-CCNC: 71 U/L (ref 40–129)
ALPHA 2 PERCENT: NORMAL %
ALPHA1 GLOB SERPL ELPH-MCNC: NORMAL %
ALPHA1 GLOB SERPL ELPH-MCNC: NORMAL G/DL
ALPHA2 GLOB SERPL ELPH-MCNC: NORMAL G/DL
ALT SERPL-CCNC: 18 U/L (ref 5–41)
ANION GAP SERPL CALCULATED.3IONS-SCNC: 9 MMOL/L (ref 9–17)
AST SERPL-CCNC: 22 U/L
B-GLOBULIN SERPL ELPH-MCNC: NORMAL %
B-GLOBULIN SERPL ELPH-MCNC: NORMAL G/DL
BASOPHILS # BLD: <0.03 K/UL (ref 0–0.2)
BASOPHILS NFR BLD: 0 % (ref 0–2)
BILIRUB SERPL-MCNC: 0.9 MG/DL (ref 0.3–1.2)
BUN SERPL-MCNC: 15 MG/DL (ref 8–23)
BUN/CREAT SERPL: 13 (ref 9–20)
CALCIUM SERPL-MCNC: 9.8 MG/DL (ref 8.6–10.4)
CHLORIDE SERPL-SCNC: 102 MMOL/L (ref 98–107)
CHOLEST SERPL-MCNC: 121 MG/DL (ref 0–199)
CHOLESTEROL/HDL RATIO: 3
CK SERPL-CCNC: 202 U/L (ref 39–308)
CO2 SERPL-SCNC: 28 MMOL/L (ref 20–31)
CREAT SERPL-MCNC: 1.2 MG/DL (ref 0.7–1.2)
EOSINOPHIL # BLD: 0.06 K/UL (ref 0–0.44)
EOSINOPHILS RELATIVE PERCENT: 1 % (ref 1–4)
ERYTHROCYTE [DISTWIDTH] IN BLOOD BY AUTOMATED COUNT: 12.3 % (ref 11.8–14.4)
EST. AVERAGE GLUCOSE BLD GHB EST-MCNC: 82 MG/DL
FOLATE SERPL-MCNC: 15.8 NG/ML (ref 4.8–24.2)
FREE KAPPA/LAMBDA RATIO: 1.48 (ref 0.22–1.74)
GAMMA GLOB SERPL ELPH-MCNC: NORMAL G/DL
GAMMA GLOBULIN %: NORMAL %
GFR SERPL CREATININE-BSD FRML MDRD: >60 ML/MIN/1.73M2
GLUCOSE SERPL-MCNC: 98 MG/DL (ref 70–99)
HBA1C MFR BLD: 4.5 % (ref 4–6)
HCT VFR BLD AUTO: 40.7 % (ref 40.7–50.3)
HDLC SERPL-MCNC: 48 MG/DL
HGB BLD-MCNC: 14.1 G/DL (ref 13–17)
IGA SERPL-MCNC: 165 MG/DL (ref 70–400)
IGG SERPL-MCNC: 1238 MG/DL (ref 700–1600)
IGM SERPL-MCNC: 73 MG/DL (ref 40–230)
IMM GRANULOCYTES # BLD AUTO: <0.03 K/UL (ref 0–0.3)
IMM GRANULOCYTES NFR BLD: 0 %
KAPPA LC FREE SER-MCNC: 28.1 MG/L
LAMBDA LC FREE SERPL-MCNC: 19 MG/L (ref 4.2–27.7)
LDLC SERPL CALC-MCNC: 49 MG/DL (ref 0–100)
LYMPHOCYTES NFR BLD: 1.66 K/UL (ref 1.1–3.7)
LYMPHOCYTES RELATIVE PERCENT: 36 % (ref 24–43)
M PROTEIN 2 SERPL ELPH-MCNC: NORMAL G/DL
M PROTEIN SERPL ELPH-MCNC: NORMAL G/DL
MAGNESIUM SERPL-MCNC: 2 MG/DL (ref 1.6–2.6)
MCH RBC QN AUTO: 29.7 PG (ref 25.2–33.5)
MCHC RBC AUTO-ENTMCNC: 34.6 G/DL (ref 28.4–34.8)
MCV RBC AUTO: 85.7 FL (ref 82.6–102.9)
MONOCYTES NFR BLD: 0.35 K/UL (ref 0.1–1.2)
MONOCYTES NFR BLD: 8 % (ref 3–12)
MYOGLOBIN SERPL-MCNC: 97 NG/ML (ref 28–72)
NEUTROPHILS NFR BLD: 55 % (ref 36–65)
NEUTS SEG NFR BLD: 2.54 K/UL (ref 1.5–8.1)
NRBC BLD-RTO: 0 PER 100 WBC
PATHOLOGIST: NORMAL
PLATELET # BLD AUTO: 165 K/UL (ref 138–453)
PMV BLD AUTO: 9.3 FL (ref 8.1–13.5)
POTASSIUM SERPL-SCNC: 4.9 MMOL/L (ref 3.7–5.3)
PROT PATTERN SERPL ELPH-IMP: NORMAL
PROT SERPL-MCNC: 7 G/DL (ref 6.6–8.7)
PROT SERPL-MCNC: 7.4 G/DL (ref 6.4–8.3)
RBC # BLD AUTO: 4.75 M/UL (ref 4.21–5.77)
SODIUM SERPL-SCNC: 139 MMOL/L (ref 135–144)
TOTAL PROT. SUM,%: NORMAL %
TOTAL PROT. SUM: NORMAL G/DL
TRIGL SERPL-MCNC: 122 MG/DL
TSH SERPL DL<=0.05 MIU/L-ACNC: 0.34 UIU/ML (ref 0.3–5)
URATE SERPL-MCNC: 5.8 MG/DL (ref 3.4–7)
VIT B12 SERPL-MCNC: 696 PG/ML (ref 232–1245)
VLDLC SERPL CALC-MCNC: 24 MG/DL
WBC OTHER # BLD: 4.6 K/UL (ref 3.5–11.3)

## 2024-03-15 PROCEDURE — 82784 ASSAY IGA/IGD/IGG/IGM EACH: CPT

## 2024-03-15 PROCEDURE — 36415 COLL VENOUS BLD VENIPUNCTURE: CPT

## 2024-03-15 PROCEDURE — 82746 ASSAY OF FOLIC ACID SERUM: CPT

## 2024-03-15 PROCEDURE — 85025 COMPLETE CBC W/AUTO DIFF WBC: CPT

## 2024-03-15 PROCEDURE — 84443 ASSAY THYROID STIM HORMONE: CPT

## 2024-03-15 PROCEDURE — 83735 ASSAY OF MAGNESIUM: CPT

## 2024-03-15 PROCEDURE — 80053 COMPREHEN METABOLIC PANEL: CPT

## 2024-03-15 PROCEDURE — 84165 PROTEIN E-PHORESIS SERUM: CPT

## 2024-03-15 PROCEDURE — 84550 ASSAY OF BLOOD/URIC ACID: CPT

## 2024-03-15 PROCEDURE — 82306 VITAMIN D 25 HYDROXY: CPT

## 2024-03-15 PROCEDURE — 83521 IG LIGHT CHAINS FREE EACH: CPT

## 2024-03-15 PROCEDURE — 82550 ASSAY OF CK (CPK): CPT

## 2024-03-15 PROCEDURE — 83036 HEMOGLOBIN GLYCOSYLATED A1C: CPT

## 2024-03-15 PROCEDURE — 82607 VITAMIN B-12: CPT

## 2024-03-15 PROCEDURE — 86334 IMMUNOFIX E-PHORESIS SERUM: CPT

## 2024-03-15 PROCEDURE — 80061 LIPID PANEL: CPT

## 2024-03-15 PROCEDURE — 84155 ASSAY OF PROTEIN SERUM: CPT

## 2024-03-15 PROCEDURE — 83874 ASSAY OF MYOGLOBIN: CPT

## 2024-03-18 LAB
ALBUMIN PERCENT: 69 % (ref 45–65)
ALBUMIN SERPL-MCNC: 4.8 G/DL (ref 3.2–5.2)
ALPHA 2 PERCENT: 7 % (ref 6–13)
ALPHA1 GLOB SERPL ELPH-MCNC: 0.1 G/DL (ref 0.1–0.4)
ALPHA1 GLOB SERPL ELPH-MCNC: 2 % (ref 3–6)
ALPHA2 GLOB SERPL ELPH-MCNC: 0.5 G/DL (ref 0.5–0.9)
B-GLOBULIN SERPL ELPH-MCNC: 0.6 G/DL (ref 0.5–1.1)
B-GLOBULIN SERPL ELPH-MCNC: 9 % (ref 11–19)
GAMMA GLOB SERPL ELPH-MCNC: 1 G/DL (ref 0.5–1.5)
GAMMA GLOBULIN %: 14 % (ref 9–20)
INTERPRETATION SERPL IFE-IMP: NORMAL
PATH REV: NORMAL
PATHOLOGIST: ABNORMAL
PROT PATTERN SERPL ELPH-IMP: ABNORMAL
PROT SERPL-MCNC: 7 G/DL (ref 6.6–8.7)
TOTAL PROT. SUM,%: 101 % (ref 98–102)
TOTAL PROT. SUM: 7 G/DL (ref 6.3–8.2)

## 2024-03-19 ENCOUNTER — OFFICE VISIT (OUTPATIENT)
Dept: NEPHROLOGY | Age: 74
End: 2024-03-19
Payer: MEDICARE

## 2024-03-19 ENCOUNTER — TELEPHONE (OUTPATIENT)
Dept: CARDIOLOGY | Age: 74
End: 2024-03-19

## 2024-03-19 VITALS
DIASTOLIC BLOOD PRESSURE: 80 MMHG | SYSTOLIC BLOOD PRESSURE: 153 MMHG | BODY MASS INDEX: 28.94 KG/M2 | WEIGHT: 169.5 LBS | HEIGHT: 64 IN | RESPIRATION RATE: 16 BRPM | TEMPERATURE: 97.2 F | HEART RATE: 78 BPM

## 2024-03-19 DIAGNOSIS — N18.2 CKD (CHRONIC KIDNEY DISEASE), STAGE II: Primary | ICD-10-CM

## 2024-03-19 DIAGNOSIS — I12.9 HYPERTENSIVE RENAL DISEASE, STAGE 1 THROUGH STAGE 4 OR UNSPECIFIED CHRONIC KIDNEY DISEASE: ICD-10-CM

## 2024-03-19 PROCEDURE — 99214 OFFICE O/P EST MOD 30 MIN: CPT | Performed by: INTERNAL MEDICINE

## 2024-03-19 PROCEDURE — 3017F COLORECTAL CA SCREEN DOC REV: CPT | Performed by: INTERNAL MEDICINE

## 2024-03-19 PROCEDURE — G8427 DOCREV CUR MEDS BY ELIG CLIN: HCPCS | Performed by: INTERNAL MEDICINE

## 2024-03-19 PROCEDURE — 1036F TOBACCO NON-USER: CPT | Performed by: INTERNAL MEDICINE

## 2024-03-19 PROCEDURE — 3077F SYST BP >= 140 MM HG: CPT | Performed by: INTERNAL MEDICINE

## 2024-03-19 PROCEDURE — 1123F ACP DISCUSS/DSCN MKR DOCD: CPT | Performed by: INTERNAL MEDICINE

## 2024-03-19 PROCEDURE — 3079F DIAST BP 80-89 MM HG: CPT | Performed by: INTERNAL MEDICINE

## 2024-03-19 PROCEDURE — G8484 FLU IMMUNIZE NO ADMIN: HCPCS | Performed by: INTERNAL MEDICINE

## 2024-03-19 PROCEDURE — G8417 CALC BMI ABV UP PARAM F/U: HCPCS | Performed by: INTERNAL MEDICINE

## 2024-03-19 RX ORDER — VALSARTAN 80 MG/1
80 TABLET ORAL 2 TIMES DAILY
Qty: 180 TABLET | Refills: 3 | Status: SHIPPED | OUTPATIENT
Start: 2024-03-19

## 2024-03-19 RX ORDER — M-VIT,TX,IRON,MINS/CALC/FOLIC 27MG-0.4MG
1 TABLET ORAL DAILY
COMMUNITY

## 2024-03-19 NOTE — PATIENT INSTRUCTIONS
SURVEY:    Thank you for allowing us to care for you today.    You may be receiving a survey from [facility] regarding your visit today- electronically or via mail.      Please help us by completing the survey as this will provide the needed feedback to ensure we are providing the very best care for you and your family.    If you cannot score us a very good on any question, please call the office to discuss how we could have made your experience a very good one.    Thank you.       STAFF:    Radha Wright, Vicki Vargas, Miriam Martin      CLINICAL STAFF:    Jane Shoemaker LPN, Radha Dickens LPN, Radha Duarte LPN, Nisreen Pond CMA

## 2024-03-19 NOTE — PROGRESS NOTES
[facility] KIDNEY AND HYPERTENSION  [address] [facility] [address]  Dept: [phone]  Office Progress Note  3/19/2024 12:18 PM      Pt Name:    Mabel Morales  YOB: 1950  Primary Care Physician:  Alvaro Douglas MD     Chief Complaint:   Chief Complaint   Patient presents with    Chronic Kidney Disease     Follow up for CKD 2 and JOEL. Denies         Background Information/Interval History:   72 yo male with hx BPH, renal cysts, HTN who was admitted with JOEL thought to be due to ATN. He was managed supportively. His creatinine improved on discharge. Was noted to have mild proteinuria on UPCR with negative disptick. SPEP/MARTINEZ suggested monoclonal response. Was referred to hematology and dxed with MGUS. He says he did not tolerate beta blocker or clonidine in the past.     Says he was in [place] in Dec 2023. Was dehydrated from diarrhea. Needed some IVF. Reports his valsartan was increased in Jan by MD in [place]. His now taking diovan 40 mg in am, 80 mg inpm.  He brought a lof of BP readings and most readings are in 120-130 but diastolic in 85 range. BP was 150/103 on March 9th. This morning BP was 136/97.      Past History:  Past Medical History:   Diagnosis Date    Anticoagulant long-term use     Asthma     BPH (benign prostatic hyperplasia)     Calculus of gallbladder without cholecystitis without obstruction 04/05/2023    Epigastric pain 04/12/2023    Fracture of L1 vertebra (HCC) 10/2010    GERD (gastroesophageal reflux disease)     Gout     Hearing loss     High altitude pulmonary edema 08/2010    Hyperlipidemia     Hypertension     Hypothyroidism 1978    Irritable bowel syndrome 1974    Myalgia, lower leg 04/20/2023    Osteoarthritis     Radiculopathy of leg     left leg    Sleep apnea     Treated C-PAP 10 years    Thyroid disease     Wrist fracture, left 10/2010     Past Surgical History:   Procedure Laterality

## 2024-03-19 NOTE — TELEPHONE ENCOUNTER
----- Message from Jonathan Aguirre MD sent at 3/18/2024 11:51 PM EDT -----  Let Mr. Morales know their test result was ok. Will discuss at next visit. Thanks.

## 2024-03-21 ENCOUNTER — OFFICE VISIT (OUTPATIENT)
Dept: PRIMARY CARE CLINIC | Age: 74
End: 2024-03-21
Payer: MEDICARE

## 2024-03-21 VITALS
RESPIRATION RATE: 16 BRPM | WEIGHT: 170 LBS | HEIGHT: 64 IN | BODY MASS INDEX: 29.02 KG/M2 | OXYGEN SATURATION: 100 % | SYSTOLIC BLOOD PRESSURE: 148 MMHG | DIASTOLIC BLOOD PRESSURE: 86 MMHG

## 2024-03-21 DIAGNOSIS — I10 ESSENTIAL HYPERTENSION: Primary | ICD-10-CM

## 2024-03-21 DIAGNOSIS — E03.9 HYPOTHYROIDISM, UNSPECIFIED TYPE: ICD-10-CM

## 2024-03-21 DIAGNOSIS — D69.6 THROMBOCYTOPENIA (HCC): ICD-10-CM

## 2024-03-21 DIAGNOSIS — I50.32 CHRONIC DIASTOLIC CONGESTIVE HEART FAILURE (HCC): ICD-10-CM

## 2024-03-21 DIAGNOSIS — I48.0 PAROXYSMAL ATRIAL FIBRILLATION (HCC): ICD-10-CM

## 2024-03-21 DIAGNOSIS — D61.818 PANCYTOPENIA (HCC): ICD-10-CM

## 2024-03-21 DIAGNOSIS — E44.1 MILD MALNUTRITION (HCC): ICD-10-CM

## 2024-03-21 DIAGNOSIS — E78.5 HYPERLIPIDEMIA, UNSPECIFIED HYPERLIPIDEMIA TYPE: ICD-10-CM

## 2024-03-21 PROBLEM — E11.9 TYPE 2 DIABETES MELLITUS (HCC): Status: RESOLVED | Noted: 2023-03-15 | Resolved: 2024-03-21

## 2024-03-21 PROCEDURE — 3079F DIAST BP 80-89 MM HG: CPT | Performed by: STUDENT IN AN ORGANIZED HEALTH CARE EDUCATION/TRAINING PROGRAM

## 2024-03-21 PROCEDURE — 99211 OFF/OP EST MAY X REQ PHY/QHP: CPT | Performed by: STUDENT IN AN ORGANIZED HEALTH CARE EDUCATION/TRAINING PROGRAM

## 2024-03-21 PROCEDURE — G2211 COMPLEX E/M VISIT ADD ON: HCPCS | Performed by: STUDENT IN AN ORGANIZED HEALTH CARE EDUCATION/TRAINING PROGRAM

## 2024-03-21 PROCEDURE — G8427 DOCREV CUR MEDS BY ELIG CLIN: HCPCS | Performed by: STUDENT IN AN ORGANIZED HEALTH CARE EDUCATION/TRAINING PROGRAM

## 2024-03-21 PROCEDURE — 3077F SYST BP >= 140 MM HG: CPT | Performed by: STUDENT IN AN ORGANIZED HEALTH CARE EDUCATION/TRAINING PROGRAM

## 2024-03-21 PROCEDURE — 1123F ACP DISCUSS/DSCN MKR DOCD: CPT | Performed by: STUDENT IN AN ORGANIZED HEALTH CARE EDUCATION/TRAINING PROGRAM

## 2024-03-21 PROCEDURE — G8484 FLU IMMUNIZE NO ADMIN: HCPCS | Performed by: STUDENT IN AN ORGANIZED HEALTH CARE EDUCATION/TRAINING PROGRAM

## 2024-03-21 PROCEDURE — 1036F TOBACCO NON-USER: CPT | Performed by: STUDENT IN AN ORGANIZED HEALTH CARE EDUCATION/TRAINING PROGRAM

## 2024-03-21 PROCEDURE — G8417 CALC BMI ABV UP PARAM F/U: HCPCS | Performed by: STUDENT IN AN ORGANIZED HEALTH CARE EDUCATION/TRAINING PROGRAM

## 2024-03-21 PROCEDURE — 99214 OFFICE O/P EST MOD 30 MIN: CPT | Performed by: STUDENT IN AN ORGANIZED HEALTH CARE EDUCATION/TRAINING PROGRAM

## 2024-03-21 PROCEDURE — 3017F COLORECTAL CA SCREEN DOC REV: CPT | Performed by: STUDENT IN AN ORGANIZED HEALTH CARE EDUCATION/TRAINING PROGRAM

## 2024-03-21 RX ORDER — ERGOCALCIFEROL 1.25 MG/1
50000 CAPSULE ORAL WEEKLY
Qty: 12 CAPSULE | Refills: 3 | Status: SHIPPED | OUTPATIENT
Start: 2024-03-21

## 2024-03-21 ASSESSMENT — PATIENT HEALTH QUESTIONNAIRE - PHQ9
SUM OF ALL RESPONSES TO PHQ9 QUESTIONS 1 & 2: 0
SUM OF ALL RESPONSES TO PHQ QUESTIONS 1-9: 0
1. LITTLE INTEREST OR PLEASURE IN DOING THINGS: NOT AT ALL
2. FEELING DOWN, DEPRESSED OR HOPELESS: NOT AT ALL
SUM OF ALL RESPONSES TO PHQ QUESTIONS 1-9: 0

## 2024-03-21 NOTE — PROGRESS NOTES
1981        Years since quittin.3      Smokeless tobacco: Former    Family History   Problem Relation Age of Onset    Pacemaker Mother     Seizures Mother     High Blood Pressure Mother     Rheum Arthritis Mother     COPD Father     Arthritis Father     Asthma Father     Atrial Fibrillation Father     High Blood Pressure Father     Hypertension Sister            3/21/2024     3:04 PM 10/29/2023     6:08 PM 2023    11:33 AM 10/27/2022     2:08 PM 2021     3:52 PM   PHQ Scores   PHQ2 Score 0 0 0 0 0   PHQ9 Score 0 0 0 0 0     Interpretation of Total Score Depression Severity: 1-4 = Minimal depression, 5-9 = Mild depression, 10-14 = Moderate depression, 15-19 = Moderately severe depression, 20-27 = Severe depression    Review of Systems  Constitutional: Negative for activity change, appetite change, chills, diaphoresis, fatigue, fever and unexpected weight change.   HENT: Negative for sinus pressure, sinus pain, sore throat and trouble swallowing.    Respiratory: Negative for cough, shortness of breath and wheezing.    Cardiovascular: Negative for chest pain, palpitations and leg swelling.   Gastrointestinal: Negative for abdominal pain, diarrhea, nausea and vomiting.   Endocrine: Negative for cold intolerance, polydipsia, polyphagia and polyuria.   Genitourinary: Negative for difficulty urinating, flank pain and frequency.   Musculoskeletal: Negative for gait problem and joint swelling. Negative for back pain, neck pain and neck stiffness.   Skin: Negative for color change and wound. Negative for pallor and rash.   Allergic/Immunologic: Negative for environmental allergies and food allergies.   Neurological: Negative for light-headedness, numbness and headaches.   Psychiatric/Behavioral: Negative for sleep disturbance. Negative for confusion and suicidal ideas.     Objective:    BP (!) 148/86   Resp 16   Ht 1.626 m (5' 4\")   Wt 77.1 kg (170 lb)   SpO2 100%   BMI 29.18 kg/m²    BP Readings from

## 2024-04-16 ENCOUNTER — OFFICE VISIT (OUTPATIENT)
Dept: CARDIOLOGY | Age: 74
End: 2024-04-16
Payer: MEDICARE

## 2024-04-16 VITALS
HEART RATE: 81 BPM | WEIGHT: 171 LBS | BODY MASS INDEX: 29.19 KG/M2 | OXYGEN SATURATION: 99 % | SYSTOLIC BLOOD PRESSURE: 139 MMHG | HEIGHT: 64 IN | RESPIRATION RATE: 16 BRPM | DIASTOLIC BLOOD PRESSURE: 85 MMHG

## 2024-04-16 DIAGNOSIS — I48.0 PAF (PAROXYSMAL ATRIAL FIBRILLATION) (HCC): Primary | ICD-10-CM

## 2024-04-16 DIAGNOSIS — Z95.0 CARDIAC PACEMAKER IN SITU: ICD-10-CM

## 2024-04-16 DIAGNOSIS — I50.32 CHRONIC DIASTOLIC CONGESTIVE HEART FAILURE (HCC): ICD-10-CM

## 2024-04-16 DIAGNOSIS — E78.2 MIXED HYPERLIPIDEMIA: ICD-10-CM

## 2024-04-16 DIAGNOSIS — Z79.01 CHRONIC ANTICOAGULATION: ICD-10-CM

## 2024-04-16 DIAGNOSIS — Z87.448 HISTORY OF RENAL INSUFFICIENCY: ICD-10-CM

## 2024-04-16 DIAGNOSIS — I10 ESSENTIAL HYPERTENSION: ICD-10-CM

## 2024-04-16 PROCEDURE — 3079F DIAST BP 80-89 MM HG: CPT | Performed by: FAMILY MEDICINE

## 2024-04-16 PROCEDURE — G8417 CALC BMI ABV UP PARAM F/U: HCPCS | Performed by: FAMILY MEDICINE

## 2024-04-16 PROCEDURE — 1036F TOBACCO NON-USER: CPT | Performed by: FAMILY MEDICINE

## 2024-04-16 PROCEDURE — 99211 OFF/OP EST MAY X REQ PHY/QHP: CPT | Performed by: FAMILY MEDICINE

## 2024-04-16 PROCEDURE — 99214 OFFICE O/P EST MOD 30 MIN: CPT | Performed by: FAMILY MEDICINE

## 2024-04-16 PROCEDURE — 3075F SYST BP GE 130 - 139MM HG: CPT | Performed by: FAMILY MEDICINE

## 2024-04-16 PROCEDURE — 3017F COLORECTAL CA SCREEN DOC REV: CPT | Performed by: FAMILY MEDICINE

## 2024-04-16 PROCEDURE — G8427 DOCREV CUR MEDS BY ELIG CLIN: HCPCS | Performed by: FAMILY MEDICINE

## 2024-04-16 PROCEDURE — 1123F ACP DISCUSS/DSCN MKR DOCD: CPT | Performed by: FAMILY MEDICINE

## 2024-04-16 NOTE — PROGRESS NOTES
4/12/2023 global left ventricular systolic function appears preserved with an estimated ejection fraction of >65%. The left ventricular wall thickness is mildly increased. Definity echo contrast was used to better assess left ventricular wall motion and thickness. Mildly dilated right ventricle with preserved right ventricular systolic function. Mild tricuspid regurgitation. No clear evidence of diastolic dysfunction was seen. Stress test done on 5/10/2023 baseline rhythm is sinus with right bundle branch block. Stress ECG with adenosine showed no stress-induced ECG changes, with occasional PACs. No significant ventricular arrhythmia. Largely normal myocardial perfusion imaging with soft tissue artifact, but without significant evidence of myocardial ischemia or infarction. Summed stress score is 0. Global left ventricular systolic function was normal with an EF of 62% without regional wall motion abnormalities. Overall, these results are most consistent with low risk for significant ischemia.    Mr. Morales is here today for his follow up visit. He reports he has been doing well lately. He does have some lightheaded/dizziness with standing up too quick, it sometimes lingers longer than others. He is monitoring his blood pressures at home to monitor.     He is still in the process of moving and his thinking maybe closer to July he will move into his house in [place]. He still exercises but not as much before. He did spend some time in [place] for a month or so and then was in [place] as well. He admits to not eating and drinking as well at he should be.  No syncopal episodes since being in [facility] in December.     He does have a couple hernia's in his abdomen but they do not bother him at all.     He denies having any chest pain, pressure or tightness. He denies having any shortness of breath or palpitations. No abdominal pain, bleeding problems, bowel issues, problems with his medications or any other concerns at this

## 2024-05-16 DIAGNOSIS — M1A.9XX0 CHRONIC GOUT WITHOUT TOPHUS, UNSPECIFIED CAUSE, UNSPECIFIED SITE: ICD-10-CM

## 2024-05-16 RX ORDER — VALSARTAN 80 MG/1
80 TABLET ORAL DAILY
Qty: 90 TABLET | Refills: 0 | Status: SHIPPED | OUTPATIENT
Start: 2024-05-16

## 2024-05-16 RX ORDER — PANTOPRAZOLE SODIUM 40 MG/1
40 TABLET, DELAYED RELEASE ORAL DAILY
Qty: 90 TABLET | Refills: 3 | Status: SHIPPED | OUTPATIENT
Start: 2024-05-16 | End: 2024-08-14

## 2024-05-16 RX ORDER — ALLOPURINOL 100 MG/1
100 TABLET ORAL DAILY
Qty: 90 TABLET | Refills: 3 | Status: SHIPPED | OUTPATIENT
Start: 2024-05-16

## 2024-05-17 ENCOUNTER — HOSPITAL ENCOUNTER (OUTPATIENT)
Age: 74
End: 2024-05-17
Payer: MEDICARE

## 2024-05-17 ENCOUNTER — HOSPITAL ENCOUNTER (OUTPATIENT)
Age: 74
Discharge: HOME | End: 2024-05-17
Payer: MEDICARE

## 2024-05-17 ENCOUNTER — TELEPHONE (OUTPATIENT)
Dept: INFUSION THERAPY | Age: 74
End: 2024-05-17

## 2024-05-17 ENCOUNTER — HOSPITAL ENCOUNTER (OUTPATIENT)
Dept: GENERAL RADIOLOGY | Age: 74
End: 2024-05-17
Payer: MEDICARE

## 2024-05-17 DIAGNOSIS — D47.2 MGUS (MONOCLONAL GAMMOPATHY OF UNKNOWN SIGNIFICANCE): ICD-10-CM

## 2024-05-17 DIAGNOSIS — D69.6 THROMBOCYTOPENIA (HCC): ICD-10-CM

## 2024-05-17 DIAGNOSIS — D69.6 THROMBOCYTOPENIA (HCC): Primary | ICD-10-CM

## 2024-05-17 LAB
ALBUMIN PERCENT: NORMAL %
ALBUMIN SERPL-MCNC: 4.6 G/DL (ref 3.5–5.2)
ALBUMIN SERPL-MCNC: NORMAL G/DL
ALBUMIN/GLOB SERPL: 1.6 {RATIO} (ref 1–2.5)
ALP SERPL-CCNC: 72 U/L (ref 40–129)
ALPHA 2 PERCENT: NORMAL %
ALPHA1 GLOB SERPL ELPH-MCNC: NORMAL %
ALPHA1 GLOB SERPL ELPH-MCNC: NORMAL G/DL
ALPHA2 GLOB SERPL ELPH-MCNC: NORMAL G/DL
ALT SERPL-CCNC: 21 U/L (ref 5–41)
ANION GAP SERPL CALCULATED.3IONS-SCNC: 9 MMOL/L (ref 9–17)
AST SERPL-CCNC: 24 U/L
B-GLOBULIN SERPL ELPH-MCNC: NORMAL %
B-GLOBULIN SERPL ELPH-MCNC: NORMAL G/DL
BASOPHILS # BLD: <0.03 K/UL (ref 0–0.2)
BASOPHILS NFR BLD: 0 % (ref 0–2)
BILIRUB SERPL-MCNC: 1.3 MG/DL (ref 0.3–1.2)
BUN SERPL-MCNC: 18 MG/DL (ref 8–23)
BUN/CREAT SERPL: 15 (ref 9–20)
CALCIUM SERPL-MCNC: 10 MG/DL (ref 8.6–10.4)
CHLORIDE SERPL-SCNC: 99 MMOL/L (ref 98–107)
CO2 SERPL-SCNC: 27 MMOL/L (ref 20–31)
CREAT SERPL-MCNC: 1.2 MG/DL (ref 0.7–1.2)
EOSINOPHIL # BLD: 0.07 K/UL (ref 0–0.44)
EOSINOPHILS RELATIVE PERCENT: 2 % (ref 1–4)
ERYTHROCYTE [DISTWIDTH] IN BLOOD BY AUTOMATED COUNT: 12.5 % (ref 11.8–14.4)
FREE KAPPA/LAMBDA RATIO: 1.38 (ref 0.22–1.74)
GAMMA GLOB SERPL ELPH-MCNC: NORMAL G/DL
GAMMA GLOBULIN %: NORMAL %
GFR, ESTIMATED: 64 ML/MIN/1.73M2
GLUCOSE SERPL-MCNC: 99 MG/DL (ref 70–99)
HCT VFR BLD AUTO: 42.2 % (ref 40.7–50.3)
HGB BLD-MCNC: 14.5 G/DL (ref 13–17)
IMM GRANULOCYTES # BLD AUTO: <0.03 K/UL (ref 0–0.3)
IMM GRANULOCYTES NFR BLD: 0 %
KAPPA LC FREE SER-MCNC: 26 MG/L
LAMBDA LC FREE SERPL-MCNC: 18.9 MG/L (ref 4.2–27.7)
LYMPHOCYTES NFR BLD: 1.95 K/UL (ref 1.1–3.7)
LYMPHOCYTES RELATIVE PERCENT: 41 % (ref 24–43)
M PROTEIN 2 SERPL ELPH-MCNC: NORMAL G/DL
M PROTEIN SERPL ELPH-MCNC: NORMAL G/DL
MCH RBC QN AUTO: 29.5 PG (ref 25.2–33.5)
MCHC RBC AUTO-ENTMCNC: 34.4 G/DL (ref 28.4–34.8)
MCV RBC AUTO: 85.9 FL (ref 82.6–102.9)
MONOCYTES NFR BLD: 0.39 K/UL (ref 0.1–1.2)
MONOCYTES NFR BLD: 8 % (ref 3–12)
NEUTROPHILS NFR BLD: 49 % (ref 36–65)
NEUTS SEG NFR BLD: 2.35 K/UL (ref 1.5–8.1)
NRBC BLD-RTO: 0 PER 100 WBC
PATHOLOGIST: NORMAL
PLATELET # BLD AUTO: 166 K/UL (ref 138–453)
PMV BLD AUTO: 9.5 FL (ref 8.1–13.5)
POTASSIUM SERPL-SCNC: 5.1 MMOL/L (ref 3.7–5.3)
PROT PATTERN SERPL ELPH-IMP: NORMAL
PROT SERPL-MCNC: 7.2 G/DL (ref 6.6–8.7)
PROT SERPL-MCNC: 7.5 G/DL (ref 6.4–8.3)
RBC # BLD AUTO: 4.91 M/UL (ref 4.21–5.77)
SODIUM SERPL-SCNC: 135 MMOL/L (ref 135–144)
TOTAL PROT. SUM,%: NORMAL %
TOTAL PROT. SUM: NORMAL G/DL
WBC OTHER # BLD: 4.8 K/UL (ref 3.5–11.3)

## 2024-05-17 PROCEDURE — 83521 IG LIGHT CHAINS FREE EACH: CPT

## 2024-05-17 PROCEDURE — 77075 RADEX OSSEOUS SURVEY COMPL: CPT

## 2024-05-17 PROCEDURE — 84165 PROTEIN E-PHORESIS SERUM: CPT

## 2024-05-17 PROCEDURE — 36415 COLL VENOUS BLD VENIPUNCTURE: CPT

## 2024-05-17 PROCEDURE — 84155 ASSAY OF PROTEIN SERUM: CPT

## 2024-05-17 PROCEDURE — 80053 COMPREHEN METABOLIC PANEL: CPT

## 2024-05-17 PROCEDURE — 85025 COMPLETE CBC W/AUTO DIFF WBC: CPT

## 2024-05-17 NOTE — TELEPHONE ENCOUNTER
Patient called stating that he has xrays today and wants to know if MD wants PET scan or bone scan?  He is leaving for vacation 5/25 and wanted to get these prior to vacation if MD wanted these tests.

## 2024-05-20 ENCOUNTER — OFFICE VISIT (OUTPATIENT)
Dept: PRIMARY CARE CLINIC | Age: 74
End: 2024-05-20
Payer: MEDICARE

## 2024-05-20 VITALS
HEIGHT: 64 IN | SYSTOLIC BLOOD PRESSURE: 142 MMHG | HEART RATE: 74 BPM | WEIGHT: 170 LBS | BODY MASS INDEX: 29.02 KG/M2 | OXYGEN SATURATION: 98 % | DIASTOLIC BLOOD PRESSURE: 86 MMHG

## 2024-05-20 DIAGNOSIS — R93.89 ABNORMAL FINDING ON IMAGING: ICD-10-CM

## 2024-05-20 DIAGNOSIS — R93.6 ABNORMAL X-RAY OF LOWER EXTREMITY: Primary | ICD-10-CM

## 2024-05-20 DIAGNOSIS — S32.018B: ICD-10-CM

## 2024-05-20 DIAGNOSIS — S32.019D CLOSED FRACTURE OF FIRST LUMBAR VERTEBRA WITH ROUTINE HEALING, UNSPECIFIED FRACTURE MORPHOLOGY, SUBSEQUENT ENCOUNTER: ICD-10-CM

## 2024-05-20 DIAGNOSIS — R10.84 GENERALIZED ABDOMINAL PAIN: ICD-10-CM

## 2024-05-20 LAB
ALBUMIN PERCENT: 65 % (ref 45–65)
ALBUMIN SERPL-MCNC: 4.7 G/DL (ref 3.2–5.2)
ALPHA 2 PERCENT: 6 % (ref 6–13)
ALPHA1 GLOB SERPL ELPH-MCNC: 0.2 G/DL (ref 0.1–0.4)
ALPHA1 GLOB SERPL ELPH-MCNC: 3 % (ref 3–6)
ALPHA2 GLOB SERPL ELPH-MCNC: 0.5 G/DL (ref 0.5–0.9)
B-GLOBULIN SERPL ELPH-MCNC: 0.8 G/DL (ref 0.5–1.1)
B-GLOBULIN SERPL ELPH-MCNC: 11 % (ref 11–19)
GAMMA GLOB SERPL ELPH-MCNC: 1.1 G/DL (ref 0.5–1.5)
GAMMA GLOBULIN %: 15 % (ref 9–20)
PATHOLOGIST: NORMAL
PROT PATTERN SERPL ELPH-IMP: NORMAL
PROT SERPL-MCNC: 7.2 G/DL (ref 6.6–8.7)
TOTAL PROT. SUM,%: 100 % (ref 98–102)
TOTAL PROT. SUM: 7.3 G/DL (ref 6.3–8.2)

## 2024-05-20 PROCEDURE — 99211 OFF/OP EST MAY X REQ PHY/QHP: CPT | Performed by: STUDENT IN AN ORGANIZED HEALTH CARE EDUCATION/TRAINING PROGRAM

## 2024-05-20 PROCEDURE — G8417 CALC BMI ABV UP PARAM F/U: HCPCS | Performed by: STUDENT IN AN ORGANIZED HEALTH CARE EDUCATION/TRAINING PROGRAM

## 2024-05-20 PROCEDURE — 99214 OFFICE O/P EST MOD 30 MIN: CPT | Performed by: STUDENT IN AN ORGANIZED HEALTH CARE EDUCATION/TRAINING PROGRAM

## 2024-05-20 PROCEDURE — G8427 DOCREV CUR MEDS BY ELIG CLIN: HCPCS | Performed by: STUDENT IN AN ORGANIZED HEALTH CARE EDUCATION/TRAINING PROGRAM

## 2024-05-20 PROCEDURE — G2211 COMPLEX E/M VISIT ADD ON: HCPCS | Performed by: STUDENT IN AN ORGANIZED HEALTH CARE EDUCATION/TRAINING PROGRAM

## 2024-05-20 PROCEDURE — 3077F SYST BP >= 140 MM HG: CPT | Performed by: STUDENT IN AN ORGANIZED HEALTH CARE EDUCATION/TRAINING PROGRAM

## 2024-05-20 PROCEDURE — 3079F DIAST BP 80-89 MM HG: CPT | Performed by: STUDENT IN AN ORGANIZED HEALTH CARE EDUCATION/TRAINING PROGRAM

## 2024-05-20 PROCEDURE — 3017F COLORECTAL CA SCREEN DOC REV: CPT | Performed by: STUDENT IN AN ORGANIZED HEALTH CARE EDUCATION/TRAINING PROGRAM

## 2024-05-20 PROCEDURE — 1123F ACP DISCUSS/DSCN MKR DOCD: CPT | Performed by: STUDENT IN AN ORGANIZED HEALTH CARE EDUCATION/TRAINING PROGRAM

## 2024-05-20 PROCEDURE — 1036F TOBACCO NON-USER: CPT | Performed by: STUDENT IN AN ORGANIZED HEALTH CARE EDUCATION/TRAINING PROGRAM

## 2024-05-20 RX ORDER — DOCUSATE SODIUM 100 MG/1
100 CAPSULE, LIQUID FILLED ORAL 2 TIMES DAILY PRN
COMMUNITY
Start: 2024-05-10

## 2024-05-20 SDOH — ECONOMIC STABILITY: FOOD INSECURITY: WITHIN THE PAST 12 MONTHS, THE FOOD YOU BOUGHT JUST DIDN'T LAST AND YOU DIDN'T HAVE MONEY TO GET MORE.: NEVER TRUE

## 2024-05-20 SDOH — ECONOMIC STABILITY: HOUSING INSECURITY
IN THE LAST 12 MONTHS, WAS THERE A TIME WHEN YOU DID NOT HAVE A STEADY PLACE TO SLEEP OR SLEPT IN A SHELTER (INCLUDING NOW)?: NO

## 2024-05-20 SDOH — ECONOMIC STABILITY: FOOD INSECURITY: WITHIN THE PAST 12 MONTHS, YOU WORRIED THAT YOUR FOOD WOULD RUN OUT BEFORE YOU GOT MONEY TO BUY MORE.: NEVER TRUE

## 2024-05-20 SDOH — ECONOMIC STABILITY: INCOME INSECURITY: HOW HARD IS IT FOR YOU TO PAY FOR THE VERY BASICS LIKE FOOD, HOUSING, MEDICAL CARE, AND HEATING?: NOT HARD AT ALL

## 2024-05-20 NOTE — PROGRESS NOTES
exercise and medication adherence. I encouraged and discussed lifestyle modifications including diet and exercise (150+ minutes of moderate-high intensity) and the patient was agreeable to making positive/beneficial changes to both to help improve their overall health. Discussed use, benefit, and side effects of prescribed medications.  Barriers to medication compliance addressed. Patient given educational materials: see patient instructions.     HM - HM items completed today as per orders. Outstanding HM items though not limited to immunizations were discussed with the patient today, including risks, benefits and alternatives. The patient will discuss these during the next appointment per their preference. If there are any worsening or concerning signs or symptoms, patient will report to the ED and/or contact [id number] for immediate evaluation. Teach back method was used.     Subjective:    HPI  Chief Complaint   Patient presents with    Discuss Labs    Abdominal Pain    Back Pain     Discuss Labs, Abdominal Pain, and Back Pain  for 4 days  Nature of Onset and Mechanism -  sudden  Location  - abdomen and middle back pain   Characteristics/Radiation/Quality - pt presents to discuss abnormal labs and imaging   Pt also mentions abdominal pains and middle back pain described as dull intermittent, pt also mentions having constipation but has since resolved. No fever/chills/nausea noted. The patient had discussed with  . He has also been in the process of moving recently and has been lifting more than prior. He also has right lower back pain.    \"IMPRESSION:  There are a few nonspecific lucencies within the calvarium and a subtle  lucency in the proximal diametaphysis of the left femur.  Recommend follow-up  to include 3 phase bone scan/PET CT scan as appropriate.  Short interval  follow-up may also be helpful.\"    CT Imaging  \"IMPRESSION:  1. Mild cholelithiasis but no acute cholecystitis.  2. No evidence of

## 2024-06-03 ENCOUNTER — TELEPHONE (OUTPATIENT)
Dept: PULMONOLOGY | Age: 74
End: 2024-06-03

## 2024-06-03 DIAGNOSIS — G47.33 OSA (OBSTRUCTIVE SLEEP APNEA): Primary | ICD-10-CM

## 2024-06-03 NOTE — TELEPHONE ENCOUNTER
----- Message from Mohinder Bustillo MD sent at 6/3/2024 12:52 PM EDT -----   I put in an order for a new CPAP machine per request of Dr. Morales as his machine is getting old, with supplies.  Thank you

## 2024-06-03 NOTE — TELEPHONE ENCOUNTER
Called and spoke with the patient and his last sleep study was done in 2004. I informed him he will need a f/u appt. First then an order for sleep study .  He verbalized understanding and af/u appt. Arranged . For 6/17/24 at 430 with Dr. Bustillo.

## 2024-06-07 RX ORDER — METHYLPREDNISOLONE 4 MG/1
TABLET ORAL
Qty: 1 KIT | Refills: 0 | Status: SHIPPED | OUTPATIENT
Start: 2024-06-07 | End: 2024-06-13

## 2024-06-12 ENCOUNTER — OFFICE VISIT (OUTPATIENT)
Dept: ONCOLOGY | Age: 74
End: 2024-06-12
Payer: MEDICARE

## 2024-06-12 VITALS
BODY MASS INDEX: 28.68 KG/M2 | WEIGHT: 168 LBS | SYSTOLIC BLOOD PRESSURE: 135 MMHG | HEIGHT: 64 IN | TEMPERATURE: 96.8 F | DIASTOLIC BLOOD PRESSURE: 89 MMHG | RESPIRATION RATE: 18 BRPM | HEART RATE: 91 BPM

## 2024-06-12 DIAGNOSIS — D47.2 MGUS (MONOCLONAL GAMMOPATHY OF UNKNOWN SIGNIFICANCE): ICD-10-CM

## 2024-06-12 DIAGNOSIS — D72.810 LYMPHOCYTOPENIA: Primary | ICD-10-CM

## 2024-06-12 PROCEDURE — 1036F TOBACCO NON-USER: CPT | Performed by: INTERNAL MEDICINE

## 2024-06-12 PROCEDURE — 3017F COLORECTAL CA SCREEN DOC REV: CPT | Performed by: INTERNAL MEDICINE

## 2024-06-12 PROCEDURE — 1123F ACP DISCUSS/DSCN MKR DOCD: CPT | Performed by: INTERNAL MEDICINE

## 2024-06-12 PROCEDURE — 3079F DIAST BP 80-89 MM HG: CPT | Performed by: INTERNAL MEDICINE

## 2024-06-12 PROCEDURE — 99214 OFFICE O/P EST MOD 30 MIN: CPT | Performed by: INTERNAL MEDICINE

## 2024-06-12 PROCEDURE — 3075F SYST BP GE 130 - 139MM HG: CPT | Performed by: INTERNAL MEDICINE

## 2024-06-12 PROCEDURE — G8417 CALC BMI ABV UP PARAM F/U: HCPCS | Performed by: INTERNAL MEDICINE

## 2024-06-12 PROCEDURE — 99211 OFF/OP EST MAY X REQ PHY/QHP: CPT | Performed by: INTERNAL MEDICINE

## 2024-06-12 PROCEDURE — G8427 DOCREV CUR MEDS BY ELIG CLIN: HCPCS | Performed by: INTERNAL MEDICINE

## 2024-06-12 NOTE — PROGRESS NOTES
DIAGNOSIS:   Monoclonal gammopathy, IgG kappa . likely MGUS, measuring 0.67 g/dl  Multiple other comorbidities including cardiac pauses leading to a pacemaker insertion, paroxysmal atrial fibrillation, sleep apnea  CURRENT THERAPY:  Surveillance/observation  BRIEF CASE HISTORY:   Mabel Morales is a very pleasant 73 y.o. male who is referred to us for monoclonal gammopathy .  He has a previous history of cardiac pauses/paroxysmal atrial fibrillation status post pacemaker insertion and currently on anticoagulation.  He also developed pericarditis secondary to the pacemaker lead.  More recently, he developed COVID-19 complicated by pneumonia.  Unfortunately he developed severe dehydration leading to prerenal azotemia as well as recurrent syncope and dizziness..  He also has history of neurocardiogenic syncope which exacerbated his symptoms.  He was seen by nephrology, as part of the work-up, monoclonal protein was appreciated.  IgG kappa measuring 0.67 g/dL.  He is sent to us for a consultation, he is feeling better he is recovering from recent infection.  He is less dizzy and he is more active.  INTERIM HISTORY  He comes in today for a follow-up, he feels well and has no complaints.  He feels well and has no complaints.  He denies any bone pain.  No weight loss, no fever or chills or night sweats.  PAST MEDICAL HISTORY: has a past medical history of Anticoagulant long-term use, Asthma, BPH (benign prostatic hyperplasia), Calculus of gallbladder without cholecystitis without obstruction, Epigastric pain, Fracture of L1 vertebra (HCC), GERD (gastroesophageal reflux disease), Gout, Hearing loss, High altitude pulmonary edema, Hyperlipidemia, Hypertension, Hypothyroidism, Irritable bowel syndrome, Myalgia, lower leg, Osteoarthritis, Radiculopathy of leg, Sleep apnea, Thyroid disease, and Wrist fracture, left.    PAST SURGICAL HISTORY: has a past surgical history that includes Pacemaker insertion; Colon

## 2024-06-16 ENCOUNTER — TELEPHONE (OUTPATIENT)
Dept: INTERNAL MEDICINE CLINIC | Age: 74
End: 2024-06-16

## 2024-06-16 RX ORDER — METHYLPREDNISOLONE 4 MG/1
TABLET ORAL
Qty: 1 KIT | Refills: 0 | Status: SHIPPED | OUTPATIENT
Start: 2024-06-16 | End: 2024-06-22

## 2024-06-17 ENCOUNTER — HOSPITAL ENCOUNTER (OUTPATIENT)
Age: 74
Discharge: HOME | End: 2024-06-19
Payer: MEDICARE

## 2024-06-17 ENCOUNTER — HOSPITAL ENCOUNTER (OUTPATIENT)
Dept: GENERAL RADIOLOGY | Age: 74
Discharge: HOME | End: 2024-06-19
Payer: MEDICARE

## 2024-06-17 ENCOUNTER — OFFICE VISIT (OUTPATIENT)
Dept: PULMONOLOGY | Age: 74
End: 2024-06-17
Payer: MEDICARE

## 2024-06-17 VITALS
SYSTOLIC BLOOD PRESSURE: 143 MMHG | OXYGEN SATURATION: 99 % | HEIGHT: 64 IN | TEMPERATURE: 97.5 F | WEIGHT: 166.3 LBS | HEART RATE: 80 BPM | DIASTOLIC BLOOD PRESSURE: 86 MMHG | RESPIRATION RATE: 16 BRPM | BODY MASS INDEX: 28.39 KG/M2

## 2024-06-17 DIAGNOSIS — I48.0 PAROXYSMAL ATRIAL FIBRILLATION (HCC): ICD-10-CM

## 2024-06-17 DIAGNOSIS — J45.40 MODERATE PERSISTENT ASTHMA WITHOUT COMPLICATION: ICD-10-CM

## 2024-06-17 DIAGNOSIS — I31.39 PERICARDIAL EFFUSION WITH CARDIAC TAMPONADE: ICD-10-CM

## 2024-06-17 DIAGNOSIS — Z95.0 PACEMAKER: ICD-10-CM

## 2024-06-17 DIAGNOSIS — J45.40 MODERATE PERSISTENT ASTHMA WITHOUT COMPLICATION: Primary | ICD-10-CM

## 2024-06-17 DIAGNOSIS — I31.4 PERICARDIAL EFFUSION WITH CARDIAC TAMPONADE: ICD-10-CM

## 2024-06-17 DIAGNOSIS — G47.33 OSA (OBSTRUCTIVE SLEEP APNEA): ICD-10-CM

## 2024-06-17 DIAGNOSIS — D47.2 MGUS (MONOCLONAL GAMMOPATHY OF UNKNOWN SIGNIFICANCE): ICD-10-CM

## 2024-06-17 DIAGNOSIS — E66.3 OVERWEIGHT (BMI 25.0-29.9): ICD-10-CM

## 2024-06-17 DIAGNOSIS — K21.9 GASTROESOPHAGEAL REFLUX DISEASE, UNSPECIFIED WHETHER ESOPHAGITIS PRESENT: ICD-10-CM

## 2024-06-17 PROCEDURE — 71046 X-RAY EXAM CHEST 2 VIEWS: CPT

## 2024-06-17 PROCEDURE — G8427 DOCREV CUR MEDS BY ELIG CLIN: HCPCS | Performed by: INTERNAL MEDICINE

## 2024-06-17 PROCEDURE — 99214 OFFICE O/P EST MOD 30 MIN: CPT | Performed by: INTERNAL MEDICINE

## 2024-06-17 PROCEDURE — 1123F ACP DISCUSS/DSCN MKR DOCD: CPT | Performed by: INTERNAL MEDICINE

## 2024-06-17 PROCEDURE — G8417 CALC BMI ABV UP PARAM F/U: HCPCS | Performed by: INTERNAL MEDICINE

## 2024-06-17 PROCEDURE — 3077F SYST BP >= 140 MM HG: CPT | Performed by: INTERNAL MEDICINE

## 2024-06-17 PROCEDURE — 3079F DIAST BP 80-89 MM HG: CPT | Performed by: INTERNAL MEDICINE

## 2024-06-17 PROCEDURE — 1036F TOBACCO NON-USER: CPT | Performed by: INTERNAL MEDICINE

## 2024-06-17 PROCEDURE — 3017F COLORECTAL CA SCREEN DOC REV: CPT | Performed by: INTERNAL MEDICINE

## 2024-06-17 RX ORDER — METHYLPREDNISOLONE 4 MG/1
TABLET ORAL
Qty: 1 KIT | Refills: 0 | Status: SHIPPED | OUTPATIENT
Start: 2024-06-17 | End: 2024-06-23

## 2024-06-17 RX ORDER — FLUTICASONE FUROATE, UMECLIDINIUM BROMIDE AND VILANTEROL TRIFENATATE 200; 62.5; 25 UG/1; UG/1; UG/1
1 POWDER RESPIRATORY (INHALATION) DAILY
Qty: 1 EACH | Refills: 2 | Status: SHIPPED | OUTPATIENT
Start: 2024-06-17 | End: 2024-06-21 | Stop reason: ALTCHOICE

## 2024-06-17 NOTE — PROGRESS NOTES
provided -Arnuity, Anoro and Medrol Dosepak  Patient was recommended to have prednisone and an antibiotic available for use during an exacerbation  Educated and clarified the medication use.  Discussed use, benefit, and side effects of prescribed medications.  Barriers to medication compliance addressed.   Mabel Morales received counseling on the following healthy behaviors: nutrition, exercise and medication adherence  Recommend flu vaccination in the fall annually.  Recommendations given regarding pneumococcal vaccinations.  Had the COVID-19 vaccination.  Recommended the booster  Patient is not uptodate with vaccinations from pulmonary perspective.  Maintain an active lifestyle.  continue smoking cessation.  Patient was educated on how to use the respiratory medications.  All the questions that the patient has had were answered to   his satisfaction.  Home O2 evaluation was done.  Supplemental oxygen was not indicated  After reviewing the patient's smoking history and his age patient does not meet the criteria for lung cancer screening due to not having 20-pack-year smoking history and quitting smoking in 1981.  CPAP at 8 cm of water to be used for at least 4 hours every night.  Use humidifier if needed.  Patient is using as recommended and benefiting from using the PAP machine.  Weight loss was recommended and discussed.  Recommended following good sleep hygiene instructions.  Explained importance of compliance with treatment of sleep apnea.  Pt is not to drive if sleepy.  We'll see the patient back in 12 months or earlier if needed.  Patient will call us if he is sick, so he can be seen sooner.  Thank you for having us involved in the care of your patient. Please call us if you have any questions or concerns.              Mohinder Bustillo MD MD  6/17/2024 4:31 PM

## 2024-06-18 DIAGNOSIS — J06.9 UPPER RESPIRATORY TRACT INFECTION, UNSPECIFIED TYPE: Primary | ICD-10-CM

## 2024-06-18 RX ORDER — UMECLIDINIUM BROMIDE AND VILANTEROL TRIFENATATE 62.5; 25 UG/1; UG/1
1 POWDER RESPIRATORY (INHALATION) DAILY
Qty: 1 EACH | Refills: 5 | Status: SHIPPED | OUTPATIENT
Start: 2024-06-18

## 2024-06-18 RX ORDER — FLUTICASONE FUROATE 200 UG/1
1 POWDER RESPIRATORY (INHALATION) DAILY
Qty: 30 EACH | Refills: 5 | Status: SHIPPED | OUTPATIENT
Start: 2024-06-18 | End: 2024-07-18

## 2024-06-21 ENCOUNTER — OFFICE VISIT (OUTPATIENT)
Dept: PRIMARY CARE CLINIC | Age: 74
End: 2024-06-21
Payer: MEDICARE

## 2024-06-21 VITALS
DIASTOLIC BLOOD PRESSURE: 84 MMHG | BODY MASS INDEX: 28.68 KG/M2 | OXYGEN SATURATION: 96 % | HEART RATE: 78 BPM | WEIGHT: 168 LBS | SYSTOLIC BLOOD PRESSURE: 138 MMHG | HEIGHT: 64 IN

## 2024-06-21 DIAGNOSIS — R73.9 HYPERGLYCEMIA: ICD-10-CM

## 2024-06-21 DIAGNOSIS — E03.9 HYPOTHYROIDISM, UNSPECIFIED TYPE: ICD-10-CM

## 2024-06-21 DIAGNOSIS — E55.9 HYPOVITAMINOSIS D: ICD-10-CM

## 2024-06-21 DIAGNOSIS — I10 ESSENTIAL HYPERTENSION: Primary | ICD-10-CM

## 2024-06-21 DIAGNOSIS — E78.5 HYPERLIPIDEMIA, UNSPECIFIED HYPERLIPIDEMIA TYPE: ICD-10-CM

## 2024-06-21 DIAGNOSIS — I50.32 CHRONIC DIASTOLIC CONGESTIVE HEART FAILURE (HCC): ICD-10-CM

## 2024-06-21 DIAGNOSIS — J45.20 INTERMITTENT ASTHMA, UNSPECIFIED ASTHMA SEVERITY, UNSPECIFIED WHETHER COMPLICATED: ICD-10-CM

## 2024-06-21 DIAGNOSIS — I48.0 PAROXYSMAL ATRIAL FIBRILLATION (HCC): ICD-10-CM

## 2024-06-21 PROCEDURE — G8417 CALC BMI ABV UP PARAM F/U: HCPCS | Performed by: STUDENT IN AN ORGANIZED HEALTH CARE EDUCATION/TRAINING PROGRAM

## 2024-06-21 PROCEDURE — G8427 DOCREV CUR MEDS BY ELIG CLIN: HCPCS | Performed by: STUDENT IN AN ORGANIZED HEALTH CARE EDUCATION/TRAINING PROGRAM

## 2024-06-21 PROCEDURE — 3079F DIAST BP 80-89 MM HG: CPT | Performed by: STUDENT IN AN ORGANIZED HEALTH CARE EDUCATION/TRAINING PROGRAM

## 2024-06-21 PROCEDURE — 99214 OFFICE O/P EST MOD 30 MIN: CPT | Performed by: STUDENT IN AN ORGANIZED HEALTH CARE EDUCATION/TRAINING PROGRAM

## 2024-06-21 PROCEDURE — 1123F ACP DISCUSS/DSCN MKR DOCD: CPT | Performed by: STUDENT IN AN ORGANIZED HEALTH CARE EDUCATION/TRAINING PROGRAM

## 2024-06-21 PROCEDURE — 3075F SYST BP GE 130 - 139MM HG: CPT | Performed by: STUDENT IN AN ORGANIZED HEALTH CARE EDUCATION/TRAINING PROGRAM

## 2024-06-21 PROCEDURE — G2211 COMPLEX E/M VISIT ADD ON: HCPCS | Performed by: STUDENT IN AN ORGANIZED HEALTH CARE EDUCATION/TRAINING PROGRAM

## 2024-06-21 PROCEDURE — 3017F COLORECTAL CA SCREEN DOC REV: CPT | Performed by: STUDENT IN AN ORGANIZED HEALTH CARE EDUCATION/TRAINING PROGRAM

## 2024-06-21 PROCEDURE — 1036F TOBACCO NON-USER: CPT | Performed by: STUDENT IN AN ORGANIZED HEALTH CARE EDUCATION/TRAINING PROGRAM

## 2024-06-21 RX ORDER — ALBUTEROL SULFATE 90 UG/1
2 AEROSOL, METERED RESPIRATORY (INHALATION) EVERY 6 HOURS PRN
Qty: 18 G | Refills: 2 | Status: SHIPPED | OUTPATIENT
Start: 2024-06-21

## 2024-06-21 NOTE — PROGRESS NOTES
[facility]  [address] [place] [address] , [address]  [place], [place], [address]    Mabel Morales is a 73 y.o. male with  has a past medical history of Anticoagulant long-term use, Asthma, BPH (benign prostatic hyperplasia), Calculus of gallbladder without cholecystitis without obstruction, Epigastric pain, Fracture of L1 vertebra (HCC), GERD (gastroesophageal reflux disease), Gout, Hearing loss, High altitude pulmonary edema, Hyperlipidemia, Hypertension, Hypothyroidism, Irritable bowel syndrome, Myalgia, lower leg, Osteoarthritis, Radiculopathy of leg, Sleep apnea, Thyroid disease, and Wrist fracture, left.  Presented to the office today for:  Chief Complaint   Patient presents with    Hypothyroidism    Hyperglycemia    Hyperlipidemia    Hypertension       Assessment/Plan   1. Essential hypertension  -     CBC with Auto Differential; Future  -     Comprehensive Metabolic Panel; Future  2. Paroxysmal atrial fibrillation (HCC)  3. Chronic diastolic congestive heart failure (HCC)  4. Hyperglycemia  -     Hemoglobin A1C; Future  5. Hyperlipidemia, unspecified hyperlipidemia type  -     Lipid Panel; Future  6. Hypothyroidism, unspecified type  -     TSH With Reflex Ft4; Future  7. Hypovitaminosis D  -     Vitamin D 25 Hydroxy; Future  8. Intermittent asthma, unspecified asthma severity, unspecified whether complicated  -     albuterol sulfate HFA (PROVENTIL;VENTOLIN;PROAIR) 108 (90 Base) MCG/ACT inhaler; Inhale 2 puffs into the lungs every 6 hours as needed for Wheezing, Disp-18 g, R-2Normal  Return in about 3 months (around 9/21/2024) for F/U Med Management.    HTN/Afib - continue w/ valsartan at the current dose, 40/80mg Valsartan, cont w/ Eliquis at 5mg, f/u with Cardiology per prior plans.  Hyperlipidemia - continue w/ atorvastatin at 10mg at the current dose, well controlled.  Hypothyroidism - continue w/ synthroid at 125mcg PO daily  Continue w/ physical activity, 30min+/day  F/U with

## 2024-07-01 ENCOUNTER — HOSPITAL ENCOUNTER (OUTPATIENT)
Dept: SLEEP CENTER | Age: 74
Discharge: HOME | End: 2024-07-01
Payer: MEDICARE

## 2024-07-01 VITALS — BODY MASS INDEX: 28.68 KG/M2 | HEIGHT: 64 IN | WEIGHT: 168 LBS

## 2024-07-01 DIAGNOSIS — G47.33 OSA (OBSTRUCTIVE SLEEP APNEA): ICD-10-CM

## 2024-07-01 PROCEDURE — 95810 POLYSOM 6/> YRS 4/> PARAM: CPT

## 2024-07-01 ASSESSMENT — SLEEP AND FATIGUE QUESTIONNAIRES
HOW LIKELY ARE YOU TO NOD OFF OR FALL ASLEEP WHILE SITTING AND TALKING TO SOMEONE: WOULD NEVER DOZE
DO YOU SNORE: NO
USUAL AMOUNT OF TIME TO FALL ASLEEP (MIN): 5
WHAT TIME DO YOU USUALLY WAKE UP: 19800
HOW LIKELY ARE YOU TO NOD OFF OR FALL ASLEEP IN A CAR, WHILE STOPPED FOR A FEW MINUTES IN TRAFFIC: WOULD NEVER DOZE
WHAT TIME DO YOU USUALLY GO TO BED: 82800
HOW LIKELY ARE YOU TO NOD OFF OR FALL ASLEEP WHILE SITTING QUIETLY AFTER LUNCH WITHOUT ALCOHOL: HIGH CHANCE OF DOZING
HOW LIKELY ARE YOU TO NOD OFF OR FALL ASLEEP WHILE SITTING INACTIVE IN A PUBLIC PLACE: SLIGHT CHANCE OF DOZING
ESS TOTAL SCORE: 11
HOW MANY NAPS DO YOU TAKE PER WEEK: 2
FUNCTION BEST IN: MORNING
FOR THE FIRST 30 MINUTES AFTER WAKING, I AM: SOMEWHAT DROWSY
HOW LIKELY ARE YOU TO NOD OFF OR FALL ASLEEP WHILE LYING DOWN TO REST IN THE AFTERNOON WHEN CIRCUMSTANCES PERMIT: HIGH CHANCE OF DOZING
NORMAL AMOUNT OF SLEEP PER NIGHT: 6
HOW LIKELY ARE YOU TO NOD OFF OR FALL ASLEEP WHILE SITTING AND READING: MODERATE CHANCE OF DOZING
I SLEEP WELL: YES
DO YOU HAVE HIGH BLOOD PRESSURE: YES
HOW LIKELY ARE YOU TO NOD OFF OR FALL ASLEEP WHEN YOU ARE A PASSENGER IN A CAR FOR AN HOUR WITHOUT A BREAK: WOULD NEVER DOZE
HOW LIKELY ARE YOU TO NOD OFF OR FALL ASLEEP WHILE WATCHING TV: MODERATE CHANCE OF DOZING
NUMBER OF TIMES YOU WAKE PER NIGHT: 1

## 2024-07-02 DIAGNOSIS — G47.33 OSA (OBSTRUCTIVE SLEEP APNEA): Primary | ICD-10-CM

## 2024-07-02 LAB — STATUS: NORMAL

## 2024-07-02 NOTE — PROGRESS NOTES
Patient arrived for diagnostic sleep testing 7/1/24 at 8:15pm. Testing explained, all questions answered, pt voices understanding.

## 2024-07-03 NOTE — PROGRESS NOTES
Updated patient regarding the sleep study results.  Patient reports daytime sleepiness and fatigue and tiredness despite the sleep study not showing any sleep apnea.  It is possible patient may have upper airway resistance syndrome contributing to the poor quality of sleep resulting in daytime sleepiness and fatigue and tiredness.  Will prescribe auto CPAP machine with pressure range of 5 to 15 cm of water and see if that will resolve his daytime symptoms.  If the daytime symptoms were to persist despite using the auto CPAP machine, will do an MSLT and if needed use stimulant medications for daytime sleepiness.    Electronically signed by-  Mohinder Bustillo MD  7/2/2024 9:22 PM

## 2024-07-09 ENCOUNTER — TELEPHONE (OUTPATIENT)
Dept: PULMONOLOGY | Age: 74
End: 2024-07-09

## 2024-07-09 NOTE — TELEPHONE ENCOUNTER
CINDY LVM stating patient's sleep study showed \"no significant sleep apnea\" and therefore insurance will not cover a new machine. I called and spoke with Vi in [facility] sleep center and she stated patient had already indicated he was willing to pay out of pocket for device if insurance will not cover due to results. I called [facility] back and spoke with Brook, informed her of this. She stated she will add this information to patient's case and that patient is potentially eligible for an OOP discount through the \"medical service shop.\" [facility] will continue ordering process and notify patient.

## 2024-08-23 ENCOUNTER — OFFICE VISIT (OUTPATIENT)
Dept: CARDIOLOGY | Age: 74
End: 2024-08-23
Payer: MEDICARE

## 2024-08-23 VITALS
WEIGHT: 168 LBS | BODY MASS INDEX: 28.68 KG/M2 | SYSTOLIC BLOOD PRESSURE: 145 MMHG | OXYGEN SATURATION: 99 % | HEIGHT: 64 IN | DIASTOLIC BLOOD PRESSURE: 93 MMHG | RESPIRATION RATE: 18 BRPM | HEART RATE: 70 BPM

## 2024-08-23 DIAGNOSIS — I48.0 PAF (PAROXYSMAL ATRIAL FIBRILLATION) (HCC): ICD-10-CM

## 2024-08-23 DIAGNOSIS — E78.2 MIXED HYPERLIPIDEMIA: ICD-10-CM

## 2024-08-23 DIAGNOSIS — I50.32 CHRONIC DIASTOLIC CONGESTIVE HEART FAILURE (HCC): Primary | ICD-10-CM

## 2024-08-23 DIAGNOSIS — I49.5 SSS (SICK SINUS SYNDROME) (HCC): ICD-10-CM

## 2024-08-23 DIAGNOSIS — Z79.01 ENCOUNTER FOR CURRENT LONG-TERM USE OF ANTICOAGULANTS: ICD-10-CM

## 2024-08-23 DIAGNOSIS — E66.01 CLASS 2 SEVERE OBESITY DUE TO EXCESS CALORIES WITH SERIOUS COMORBIDITY IN ADULT, UNSPECIFIED BMI (HCC): ICD-10-CM

## 2024-08-23 DIAGNOSIS — Z95.0 CARDIAC PACEMAKER IN SITU: ICD-10-CM

## 2024-08-23 PROBLEM — D69.6 THROMBOCYTOPENIA (HCC): Status: RESOLVED | Noted: 2023-04-06 | Resolved: 2024-08-23

## 2024-08-23 PROBLEM — R55 SYNCOPE: Status: RESOLVED | Noted: 2023-04-12 | Resolved: 2024-08-23

## 2024-08-23 PROBLEM — D72.810 LYMPHOCYTOPENIA: Status: RESOLVED | Noted: 2023-04-08 | Resolved: 2024-08-23

## 2024-08-23 PROBLEM — N19 PRERENAL RENAL FAILURE: Status: RESOLVED | Noted: 2023-04-22 | Resolved: 2024-08-23

## 2024-08-23 PROBLEM — E44.1 MILD MALNUTRITION (HCC): Status: RESOLVED | Noted: 2023-04-13 | Resolved: 2024-08-23

## 2024-08-23 PROCEDURE — 93010 ELECTROCARDIOGRAM REPORT: CPT | Performed by: FAMILY MEDICINE

## 2024-08-23 PROCEDURE — 99214 OFFICE O/P EST MOD 30 MIN: CPT | Performed by: FAMILY MEDICINE

## 2024-08-23 PROCEDURE — G8427 DOCREV CUR MEDS BY ELIG CLIN: HCPCS | Performed by: FAMILY MEDICINE

## 2024-08-23 PROCEDURE — 1123F ACP DISCUSS/DSCN MKR DOCD: CPT | Performed by: FAMILY MEDICINE

## 2024-08-23 PROCEDURE — 93005 ELECTROCARDIOGRAM TRACING: CPT | Performed by: FAMILY MEDICINE

## 2024-08-23 PROCEDURE — 3017F COLORECTAL CA SCREEN DOC REV: CPT | Performed by: FAMILY MEDICINE

## 2024-08-23 PROCEDURE — 1036F TOBACCO NON-USER: CPT | Performed by: FAMILY MEDICINE

## 2024-08-23 PROCEDURE — 3077F SYST BP >= 140 MM HG: CPT | Performed by: FAMILY MEDICINE

## 2024-08-23 PROCEDURE — G8417 CALC BMI ABV UP PARAM F/U: HCPCS | Performed by: FAMILY MEDICINE

## 2024-08-23 PROCEDURE — 3080F DIAST BP >= 90 MM HG: CPT | Performed by: FAMILY MEDICINE

## 2024-08-23 NOTE — PROGRESS NOTES
Patient: Mabel Morales  : 1950  Date of Visit: 2024    REASON FOR VISIT / CONSULTATION: Follow-up (Hx: Afib, CHF, HTN,HLD, Pacemaker, SSS. Pt is here for 1 year follow up. Patient states he is doing well. Denies cp, palps, sob, dizziness, lightheadedness. )     History of Present Illness:        I had the pleasure of seeing Mabel Morales in my office today. Mr. Morales is a 73 y.o. male  with a history of a dual chamber Biotron[employer] pacemaker for sick sinus syndrome. He also was a smoker many years ago. His echocardiogram on 2017 showed an ejection fraction of 60%. He also has a history of paroxysmal atrial fibrillation which has been fairly rare in the past and has been treated with Eliquis. Normal stress and echo done in 2021 while in [place] He did have syncope in February while in [place] which occurred suddenly after getting up to use the bathroom. He had some pretty severe episodes of lightheadedness and dizziness with recurrent short episodes of loss of consciousness. He was admitted and he had stress test and echo done then and was told both where normal. IP from 2023-2023 due to syncope, before that he was recently hospitalized due to COVID 19 & was transferred to [facility] at that time. Echocardiogram done on 2023: EF of >65%. LV wall thickness is mildly increased. Mildly dilated RV. Mild tricuspid regurgitation. Pacemaker interrogated in office on 2023: increased rate response from medium to be more sensitive. Pacemaker interrogated on 5/10/2023 no atrial fibrillation seen. Decreased his base rate back down to 60 bpm. Admitted on 2023-2023: related to abnormal kidney function. Stable at this time now. He did have a bout of chest pain while he was in the hospital also. Echo done on 2023 global left ventricular systolic function appears preserved with an estimated ejection fraction of >65%. The left ventricular wall thickness is

## 2024-09-16 RX ORDER — ERGOCALCIFEROL 1.25 MG/1
50000 CAPSULE ORAL WEEKLY
Qty: 12 CAPSULE | Refills: 1 | Status: SHIPPED | OUTPATIENT
Start: 2024-09-16

## 2024-09-16 RX ORDER — FLUTICASONE FUROATE 100 UG/1
1 POWDER RESPIRATORY (INHALATION) DAILY
Qty: 30 EACH | Refills: 5 | Status: SHIPPED | OUTPATIENT
Start: 2024-09-16

## 2024-09-16 RX ORDER — TAMSULOSIN HYDROCHLORIDE 0.4 MG/1
0.4 CAPSULE ORAL DAILY
Qty: 90 CAPSULE | Refills: 1 | Status: SHIPPED | OUTPATIENT
Start: 2024-09-16

## 2024-09-16 RX ORDER — FINASTERIDE 5 MG/1
5 TABLET, FILM COATED ORAL DAILY
Qty: 90 TABLET | Refills: 1 | Status: SHIPPED | OUTPATIENT
Start: 2024-09-16

## 2024-09-16 RX ORDER — VALSARTAN 80 MG/1
80 TABLET ORAL DAILY
Qty: 90 TABLET | Refills: 1 | Status: SHIPPED | OUTPATIENT
Start: 2024-09-16

## 2024-09-25 DIAGNOSIS — H57.9 EYE PROBLEM: ICD-10-CM

## 2024-09-25 DIAGNOSIS — H35.9 RETINA DISORDER: Primary | ICD-10-CM

## 2024-10-02 SDOH — HEALTH STABILITY: PHYSICAL HEALTH: ON AVERAGE, HOW MANY MINUTES DO YOU ENGAGE IN EXERCISE AT THIS LEVEL?: 20 MIN

## 2024-10-02 SDOH — HEALTH STABILITY: PHYSICAL HEALTH: ON AVERAGE, HOW MANY DAYS PER WEEK DO YOU ENGAGE IN MODERATE TO STRENUOUS EXERCISE (LIKE A BRISK WALK)?: 5 DAYS

## 2024-10-02 ASSESSMENT — LIFESTYLE VARIABLES
HOW OFTEN DO YOU HAVE A DRINK CONTAINING ALCOHOL: 2-3 TIMES A WEEK
HOW OFTEN DO YOU HAVE SIX OR MORE DRINKS ON ONE OCCASION: 1
HOW MANY STANDARD DRINKS CONTAINING ALCOHOL DO YOU HAVE ON A TYPICAL DAY: 1
HOW MANY STANDARD DRINKS CONTAINING ALCOHOL DO YOU HAVE ON A TYPICAL DAY: 1 OR 2
HOW OFTEN DO YOU HAVE A DRINK CONTAINING ALCOHOL: 4

## 2024-10-02 ASSESSMENT — PATIENT HEALTH QUESTIONNAIRE - PHQ9
SUM OF ALL RESPONSES TO PHQ9 QUESTIONS 1 & 2: 0
SUM OF ALL RESPONSES TO PHQ QUESTIONS 1-9: 0
SUM OF ALL RESPONSES TO PHQ QUESTIONS 1-9: 0
2. FEELING DOWN, DEPRESSED OR HOPELESS: NOT AT ALL
SUM OF ALL RESPONSES TO PHQ QUESTIONS 1-9: 0
SUM OF ALL RESPONSES TO PHQ QUESTIONS 1-9: 0
1. LITTLE INTEREST OR PLEASURE IN DOING THINGS: NOT AT ALL

## 2024-10-03 ENCOUNTER — OFFICE VISIT (OUTPATIENT)
Dept: PRIMARY CARE CLINIC | Age: 74
End: 2024-10-03

## 2024-10-03 VITALS
WEIGHT: 171 LBS | SYSTOLIC BLOOD PRESSURE: 138 MMHG | DIASTOLIC BLOOD PRESSURE: 76 MMHG | OXYGEN SATURATION: 98 % | RESPIRATION RATE: 16 BRPM | BODY MASS INDEX: 29.19 KG/M2 | HEIGHT: 64 IN | HEART RATE: 84 BPM

## 2024-10-03 DIAGNOSIS — I48.0 PAROXYSMAL ATRIAL FIBRILLATION (HCC): ICD-10-CM

## 2024-10-03 DIAGNOSIS — Z00.00 MEDICARE ANNUAL WELLNESS VISIT, SUBSEQUENT: Primary | ICD-10-CM

## 2024-10-03 DIAGNOSIS — I50.32 CHRONIC DIASTOLIC CONGESTIVE HEART FAILURE (HCC): ICD-10-CM

## 2024-10-03 DIAGNOSIS — E78.5 HYPERLIPIDEMIA, UNSPECIFIED HYPERLIPIDEMIA TYPE: ICD-10-CM

## 2024-10-03 DIAGNOSIS — E55.9 HYPOVITAMINOSIS D: ICD-10-CM

## 2024-10-03 DIAGNOSIS — E03.9 HYPOTHYROIDISM, UNSPECIFIED TYPE: ICD-10-CM

## 2024-10-03 DIAGNOSIS — I10 ESSENTIAL HYPERTENSION: ICD-10-CM

## 2024-10-03 PROBLEM — T46.2X5A PILL ESOPHAGITIS DUE TO TETRACYCLINE: Status: RESOLVED | Noted: 2023-04-19 | Resolved: 2024-10-03

## 2024-10-03 PROBLEM — R10.84 GENERALIZED ABDOMINAL PAIN: Status: RESOLVED | Noted: 2019-11-25 | Resolved: 2024-10-03

## 2024-10-03 PROBLEM — K20.80 PILL ESOPHAGITIS DUE TO TETRACYCLINE: Status: RESOLVED | Noted: 2023-04-19 | Resolved: 2024-10-03

## 2024-10-03 PROBLEM — R55 NEAR SYNCOPE: Status: RESOLVED | Noted: 2019-11-25 | Resolved: 2024-10-03

## 2024-10-03 PROBLEM — D70.3 NEUTROPENIA ASSOCIATED WITH INFECTION (HCC): Status: RESOLVED | Noted: 2023-04-07 | Resolved: 2024-10-03

## 2024-10-03 PROBLEM — E86.0 DEHYDRATION, MILD: Status: RESOLVED | Noted: 2023-04-19 | Resolved: 2024-10-03

## 2024-10-03 PROBLEM — M79.18 MYALGIA, LOWER LEG: Status: RESOLVED | Noted: 2023-04-20 | Resolved: 2024-10-03

## 2024-10-03 PROBLEM — U07.1 COVID-19 VIRUS INFECTION: Status: RESOLVED | Noted: 2023-04-06 | Resolved: 2024-10-03

## 2024-10-03 PROBLEM — U07.1 COVID: Status: RESOLVED | Noted: 2023-04-11 | Resolved: 2024-10-03

## 2024-10-03 PROBLEM — R10.13 EPIGASTRIC PAIN: Status: RESOLVED | Noted: 2023-04-12 | Resolved: 2024-10-03

## 2024-10-03 PROBLEM — D61.818 PANCYTOPENIA (HCC): Status: RESOLVED | Noted: 2023-04-07 | Resolved: 2024-10-03

## 2024-10-03 PROBLEM — R50.9 FEVER OF UNKNOWN ORIGIN: Status: RESOLVED | Noted: 2023-04-05 | Resolved: 2024-10-03

## 2024-10-03 PROBLEM — K57.92 DIVERTICULITIS: Status: RESOLVED | Noted: 2018-11-17 | Resolved: 2024-10-03

## 2024-10-03 PROBLEM — M72.2 PLANTAR FASCIA SYNDROME: Status: RESOLVED | Noted: 2021-08-03 | Resolved: 2024-10-03

## 2024-10-03 NOTE — PROGRESS NOTES
[facility]  [address] [place] [address] , [address]  [place], [place], [address]    Medicare Annual Wellness Visit    Mabel Morales is here for Medicare AWV    Assessment & Plan   Medicare annual wellness visit, subsequent  Essential hypertension  Hyperlipidemia, unspecified hyperlipidemia type  Paroxysmal atrial fibrillation (HCC)  Chronic diastolic congestive heart failure (HCC)  Hypothyroidism, unspecified type  Hypovitaminosis D    Recommendations for Preventive Services Due: see orders and patient instructions/AVS.  Recommended screening schedule for the next 5-10 years is provided to the patient in written form: see Patient Instructions/AVS.     Return if symptoms worsen or fail to improve.     Subjective   The following acute and/or chronic problems were also addressed today:    See Separate Note    Patient's complete Health Risk Assessment and screening values have been reviewed and are found in Flowsheets. The following problems were reviewed today and where indicated follow up appointments were made and/or referrals ordered.    Positive Risk Factor Screenings with Interventions:                      Safety:  Do you have non-slip mats or non-slip surfaces or shower bars or grab bars in your shower or bathtub?: (!) No  Interventions:  Patient declined any further interventions or treatment               Objective   Vitals:    10/03/24 1621 10/03/24 1641   BP: (!) 148/88 138/76   Site: Left Upper Arm Left Upper Arm   Pulse: 84    Resp: 16    SpO2: 98%    Weight: 77.6 kg (171 lb)    Height: 1.626 m (5' 4\")       Body mass index is 29.35 kg/m².        General Appearance: alert and oriented to person, place and time, well developed and well- nourished, in no acute distress  Skin: warm and dry, no rash or erythema  Head: normocephalic and atraumatic  Eyes: pupils equal, round, and reactive to light, extraocular eye movements intact, conjunctivae normal  ENT: tympanic membrane, external ear

## 2024-10-05 RX ORDER — MECLIZINE HYDROCHLORIDE 25 MG/1
25 TABLET ORAL 3 TIMES DAILY PRN
Qty: 42 TABLET | Refills: 0 | Status: SHIPPED | OUTPATIENT
Start: 2024-10-05 | End: 2024-10-19

## 2024-11-02 PROBLEM — Z00.00 MEDICARE ANNUAL WELLNESS VISIT, SUBSEQUENT: Status: RESOLVED | Noted: 2024-10-03 | Resolved: 2024-11-02

## 2025-01-30 ENCOUNTER — NURSE ONLY (OUTPATIENT)
Dept: CARDIOLOGY | Age: 75
End: 2025-01-30

## 2025-01-30 DIAGNOSIS — Z95.0 CARDIAC PACEMAKER IN SITU: ICD-10-CM

## 2025-01-30 DIAGNOSIS — I49.5 SSS (SICK SINUS SYNDROME) (HCC): Primary | ICD-10-CM

## 2025-03-18 RX ORDER — FINASTERIDE 5 MG/1
5 TABLET, FILM COATED ORAL DAILY
Qty: 90 TABLET | Refills: 1 | Status: SHIPPED | OUTPATIENT
Start: 2025-03-18

## 2025-03-18 RX ORDER — TAMSULOSIN HYDROCHLORIDE 0.4 MG/1
0.4 CAPSULE ORAL DAILY
Qty: 90 CAPSULE | Refills: 1 | Status: SHIPPED | OUTPATIENT
Start: 2025-03-18

## 2025-03-18 RX ORDER — TAMSULOSIN HYDROCHLORIDE 0.4 MG/1
0.4 CAPSULE ORAL DAILY
Qty: 90 CAPSULE | Refills: 1 | Status: SHIPPED | OUTPATIENT
Start: 2025-03-18 | End: 2025-03-18

## 2025-03-18 RX ORDER — FINASTERIDE 5 MG/1
5 TABLET, FILM COATED ORAL DAILY
Qty: 90 TABLET | Refills: 1 | Status: SHIPPED | OUTPATIENT
Start: 2025-03-18 | End: 2025-03-18

## 2025-03-19 NOTE — TELEPHONE ENCOUNTER
Patient states [facility] in [place] but now when he goes to [place] he won't have to worry about asking for refills.

## 2025-04-18 LAB
ESTIMATED AVERAGE GLUCOSE: 100
HBA1C MFR BLD: 5.1 %

## 2025-04-28 RX ORDER — ERGOCALCIFEROL 1.25 MG/1
50000 CAPSULE, LIQUID FILLED ORAL WEEKLY
Qty: 12 CAPSULE | Refills: 1 | Status: SHIPPED | OUTPATIENT
Start: 2025-04-28

## 2025-04-28 RX ORDER — PANTOPRAZOLE SODIUM 40 MG/1
40 TABLET, DELAYED RELEASE ORAL DAILY
Qty: 90 TABLET | Refills: 3 | Status: SHIPPED | OUTPATIENT
Start: 2025-04-28 | End: 2025-07-27

## 2025-06-07 DIAGNOSIS — I10 ESSENTIAL HYPERTENSION: Primary | ICD-10-CM

## 2025-06-07 RX ORDER — AMLODIPINE BESYLATE 5 MG/1
5 TABLET ORAL DAILY
Qty: 90 TABLET | Refills: 3 | Status: SHIPPED | OUTPATIENT
Start: 2025-06-07

## 2025-06-24 RX ORDER — FLUTICASONE FUROATE 100 UG/1
POWDER RESPIRATORY (INHALATION)
Qty: 1 EACH | Refills: 0 | OUTPATIENT
Start: 2025-06-24